# Patient Record
Sex: MALE | Race: WHITE | NOT HISPANIC OR LATINO | Employment: FULL TIME | URBAN - METROPOLITAN AREA
[De-identification: names, ages, dates, MRNs, and addresses within clinical notes are randomized per-mention and may not be internally consistent; named-entity substitution may affect disease eponyms.]

---

## 2017-02-21 ENCOUNTER — HOSPITAL ENCOUNTER (EMERGENCY)
Facility: HOSPITAL | Age: 62
Discharge: HOME | End: 2017-02-21
Admitting: EMERGENCY MEDICINE
Payer: COMMERCIAL

## 2017-02-21 ENCOUNTER — ALLSCRIPTS OFFICE VISIT (OUTPATIENT)
Dept: OTHER | Facility: OTHER | Age: 62
End: 2017-02-21

## 2017-02-21 ENCOUNTER — APPOINTMENT (EMERGENCY)
Dept: RADIOLOGY | Facility: HOSPITAL | Age: 62
End: 2017-02-21
Payer: COMMERCIAL

## 2017-02-21 VITALS
SYSTOLIC BLOOD PRESSURE: 193 MMHG | HEART RATE: 88 BPM | RESPIRATION RATE: 18 BRPM | TEMPERATURE: 98.9 F | WEIGHT: 232 LBS | OXYGEN SATURATION: 95 % | DIASTOLIC BLOOD PRESSURE: 107 MMHG

## 2017-02-21 DIAGNOSIS — S62.91XA HAND FRACTURE, RIGHT: Primary | ICD-10-CM

## 2017-02-21 DIAGNOSIS — S62.316A CLOSED DISPLACED FRACTURE OF BASE OF FIFTH METACARPAL BONE OF RIGHT HAND: ICD-10-CM

## 2017-02-21 PROCEDURE — 73130 X-RAY EXAM OF HAND: CPT

## 2017-02-21 PROCEDURE — 99283 EMERGENCY DEPT VISIT LOW MDM: CPT

## 2017-02-21 RX ORDER — IBUPROFEN 200 MG
400 TABLET ORAL ONCE
Status: COMPLETED | OUTPATIENT
Start: 2017-02-21 | End: 2017-02-21

## 2017-02-21 RX ORDER — OXYCODONE HYDROCHLORIDE AND ACETAMINOPHEN 5; 325 MG/1; MG/1
TABLET ORAL
Qty: 10 TABLET | Refills: 0 | Status: SHIPPED | OUTPATIENT
Start: 2017-02-21 | End: 2018-04-23

## 2017-02-21 RX ORDER — IBUPROFEN 200 MG
TABLET ORAL
Status: COMPLETED
Start: 2017-02-21 | End: 2017-02-21

## 2017-02-21 RX ADMIN — IBUPROFEN 400 MG: 200 TABLET, FILM COATED ORAL at 06:57

## 2017-02-21 RX ADMIN — Medication 400 MG: at 06:57

## 2017-02-24 ENCOUNTER — APPOINTMENT (OUTPATIENT)
Dept: OCCUPATIONAL THERAPY | Age: 62
End: 2017-02-24
Payer: COMMERCIAL

## 2017-02-24 ENCOUNTER — GENERIC CONVERSION - ENCOUNTER (OUTPATIENT)
Dept: OTHER | Facility: OTHER | Age: 62
End: 2017-02-24

## 2017-02-24 PROCEDURE — L3806 WHFO W/JOINT(S) CUSTOM FAB: HCPCS

## 2017-03-21 ENCOUNTER — ALLSCRIPTS OFFICE VISIT (OUTPATIENT)
Dept: OTHER | Facility: OTHER | Age: 62
End: 2017-03-21

## 2017-03-29 ENCOUNTER — APPOINTMENT (OUTPATIENT)
Dept: OCCUPATIONAL THERAPY | Facility: MEDICAL CENTER | Age: 62
End: 2017-03-29
Payer: COMMERCIAL

## 2017-03-29 ENCOUNTER — GENERIC CONVERSION - ENCOUNTER (OUTPATIENT)
Dept: OTHER | Facility: OTHER | Age: 62
End: 2017-03-29

## 2017-03-29 PROCEDURE — 97165 OT EVAL LOW COMPLEX 30 MIN: CPT

## 2017-04-03 ENCOUNTER — APPOINTMENT (OUTPATIENT)
Dept: OCCUPATIONAL THERAPY | Facility: MEDICAL CENTER | Age: 62
End: 2017-04-03
Payer: COMMERCIAL

## 2017-04-05 ENCOUNTER — APPOINTMENT (OUTPATIENT)
Dept: OCCUPATIONAL THERAPY | Facility: MEDICAL CENTER | Age: 62
End: 2017-04-05
Payer: COMMERCIAL

## 2017-04-07 ENCOUNTER — APPOINTMENT (OUTPATIENT)
Dept: OCCUPATIONAL THERAPY | Facility: MEDICAL CENTER | Age: 62
End: 2017-04-07
Payer: COMMERCIAL

## 2017-04-07 PROCEDURE — 97010 HOT OR COLD PACKS THERAPY: CPT

## 2017-04-07 PROCEDURE — 97110 THERAPEUTIC EXERCISES: CPT

## 2017-04-10 ENCOUNTER — APPOINTMENT (OUTPATIENT)
Dept: OCCUPATIONAL THERAPY | Facility: MEDICAL CENTER | Age: 62
End: 2017-04-10
Payer: COMMERCIAL

## 2017-04-12 ENCOUNTER — APPOINTMENT (OUTPATIENT)
Dept: OCCUPATIONAL THERAPY | Facility: MEDICAL CENTER | Age: 62
End: 2017-04-12
Payer: COMMERCIAL

## 2017-04-17 ENCOUNTER — APPOINTMENT (OUTPATIENT)
Dept: OCCUPATIONAL THERAPY | Facility: MEDICAL CENTER | Age: 62
End: 2017-04-17
Payer: COMMERCIAL

## 2017-04-18 ENCOUNTER — HOSPITAL ENCOUNTER (OUTPATIENT)
Dept: RADIOLOGY | Facility: OTHER | Age: 62
Discharge: HOME | End: 2017-04-18
Payer: COMMERCIAL

## 2017-04-18 ENCOUNTER — ALLSCRIPTS OFFICE VISIT (OUTPATIENT)
Dept: OTHER | Facility: OTHER | Age: 62
End: 2017-04-18

## 2017-04-18 DIAGNOSIS — S62.316A CLOSED DISPLACED FRACTURE OF BASE OF FIFTH METACARPAL BONE OF RIGHT HAND: ICD-10-CM

## 2017-04-19 ENCOUNTER — APPOINTMENT (OUTPATIENT)
Dept: OCCUPATIONAL THERAPY | Facility: MEDICAL CENTER | Age: 62
End: 2017-04-19
Payer: COMMERCIAL

## 2017-04-24 ENCOUNTER — APPOINTMENT (OUTPATIENT)
Dept: OCCUPATIONAL THERAPY | Facility: MEDICAL CENTER | Age: 62
End: 2017-04-24
Payer: COMMERCIAL

## 2017-04-26 ENCOUNTER — APPOINTMENT (OUTPATIENT)
Dept: OCCUPATIONAL THERAPY | Facility: MEDICAL CENTER | Age: 62
End: 2017-04-26
Payer: COMMERCIAL

## 2017-05-22 ENCOUNTER — ALLSCRIPTS OFFICE VISIT (OUTPATIENT)
Dept: OTHER | Facility: OTHER | Age: 62
End: 2017-05-22

## 2017-05-23 ENCOUNTER — LAB CONVERSION - ENCOUNTER (OUTPATIENT)
Dept: OTHER | Facility: OTHER | Age: 62
End: 2017-05-23

## 2017-05-23 LAB
25(OH)D3 SERPL-MCNC: 36 NG/ML (ref 30–100)
A/G RATIO (HISTORICAL): 1.8 (CALC) (ref 1–2.5)
ALBUMIN SERPL BCP-MCNC: 4.6 G/DL (ref 3.6–5.1)
ALP SERPL-CCNC: 75 U/L (ref 40–115)
ALT SERPL W P-5'-P-CCNC: 52 U/L (ref 9–46)
AST SERPL W P-5'-P-CCNC: 28 U/L (ref 10–35)
BASOPHILS # BLD AUTO: 0.1 %
BASOPHILS # BLD AUTO: 8 CELLS/UL (ref 0–200)
BILIRUB SERPL-MCNC: 0.6 MG/DL (ref 0.2–1.2)
BUN SERPL-MCNC: 17 MG/DL (ref 7–25)
BUN/CREA RATIO (HISTORICAL): ABNORMAL (CALC) (ref 6–22)
CALCIUM (ADJUSTED FOR ALBUMIN) (HISTORICAL): 9.8 MG/DL (CALC) (ref 8.6–10.2)
CALCIUM SERPL-MCNC: 9.9 MG/DL (ref 8.6–10.3)
CHLORIDE SERPL-SCNC: 100 MMOL/L (ref 98–110)
CHOLEST SERPL-MCNC: 161 MG/DL (ref 125–200)
CHOLEST/HDLC SERPL: 3.3 (CALC)
CO2 SERPL-SCNC: 24 MMOL/L (ref 20–31)
CREAT SERPL-MCNC: 0.82 MG/DL (ref 0.7–1.25)
CREATININE, RANDOM URINE (HISTORICAL): 121 MG/DL (ref 20–370)
DEPRECATED RDW RBC AUTO: 13.6 % (ref 11–15)
EGFR AFRICAN AMERICAN (HISTORICAL): 111 ML/MIN/1.73M2
EGFR-AMERICAN CALC (HISTORICAL): 95 ML/MIN/1.73M2
EOSINOPHIL # BLD AUTO: 1.9 %
EOSINOPHIL # BLD AUTO: 156 CELLS/UL (ref 15–500)
GAMMA GLOBULIN (HISTORICAL): 2.6 G/DL (CALC) (ref 1.9–3.7)
GLUCOSE (HISTORICAL): 201 MG/DL (ref 65–99)
HBA1C MFR BLD HPLC: 10.5 % OF TOTAL HGB
HCT VFR BLD AUTO: 46 % (ref 38.5–50)
HDLC SERPL-MCNC: 49 MG/DL
HGB BLD-MCNC: 15.1 G/DL (ref 13.2–17.1)
LDL CHOLESTEROL (HISTORICAL): 85 MG/DL (CALC)
LYMPHOCYTES # BLD AUTO: 1599 CELLS/UL (ref 850–3900)
LYMPHOCYTES # BLD AUTO: 19.5 %
MAGNESIUM, UR (HISTORICAL): 3.5 MG/DL
MCH RBC QN AUTO: 29 PG (ref 27–33)
MCHC RBC AUTO-ENTMCNC: 33 G/DL (ref 32–36)
MCV RBC AUTO: 88.1 FL (ref 80–100)
MICROALBUMIN/CREATININE RATIO (HISTORICAL): 29 MCG/MG CREAT
MONOCYTES # BLD AUTO: 500 CELLS/UL (ref 200–950)
MONOCYTES (HISTORICAL): 6.1 %
NEUTROPHILS # BLD AUTO: 5937 CELLS/UL (ref 1500–7800)
NEUTROPHILS # BLD AUTO: 72.4 %
NON-HDL-CHOL (CHOL-HDL) (HISTORICAL): 112 MG/DL (CALC)
PLATELET # BLD AUTO: 233 THOUSAND/UL (ref 140–400)
PMV BLD AUTO: 10.1 FL (ref 7.5–12.5)
POTASSIUM SERPL-SCNC: 4.3 MMOL/L (ref 3.5–5.3)
RBC # BLD AUTO: 5.21 MILLION/UL (ref 4.2–5.8)
SODIUM SERPL-SCNC: 136 MMOL/L (ref 135–146)
TOTAL PROTEIN (HISTORICAL): 7.2 G/DL (ref 6.1–8.1)
TRIGL SERPL-MCNC: 137 MG/DL
TSH SERPL DL<=0.05 MIU/L-ACNC: 0.93 MIU/L (ref 0.4–4.5)
WBC # BLD AUTO: 8.2 THOUSAND/UL (ref 3.8–10.8)

## 2017-11-22 ENCOUNTER — ALLSCRIPTS OFFICE VISIT (OUTPATIENT)
Dept: OTHER | Facility: OTHER | Age: 62
End: 2017-11-22

## 2017-11-23 ENCOUNTER — LAB CONVERSION - ENCOUNTER (OUTPATIENT)
Dept: OTHER | Facility: OTHER | Age: 62
End: 2017-11-23

## 2017-11-23 LAB
A/G RATIO (HISTORICAL): 1.7 (CALC) (ref 1–2.5)
ALBUMIN SERPL BCP-MCNC: 4.4 G/DL (ref 3.6–5.1)
ALP SERPL-CCNC: 79 U/L (ref 40–115)
ALT SERPL W P-5'-P-CCNC: 39 U/L (ref 9–46)
AST SERPL W P-5'-P-CCNC: 18 U/L (ref 10–35)
BILIRUB SERPL-MCNC: 0.4 MG/DL (ref 0.2–1.2)
BUN SERPL-MCNC: 14 MG/DL (ref 7–25)
BUN/CREA RATIO (HISTORICAL): ABNORMAL (CALC) (ref 6–22)
CALCIUM (ADJUSTED FOR ALBUMIN) (HISTORICAL): 9.6 MG/DL (CALC) (ref 8.6–10.2)
CALCIUM SERPL-MCNC: 9.6 MG/DL (ref 8.6–10.3)
CHLORIDE SERPL-SCNC: 101 MMOL/L (ref 98–110)
CHOLEST SERPL-MCNC: 157 MG/DL
CHOLEST/HDLC SERPL: 3 (CALC)
CO2 SERPL-SCNC: 26 MMOL/L (ref 20–31)
CREAT SERPL-MCNC: 0.84 MG/DL (ref 0.7–1.25)
EGFR AFRICAN AMERICAN (HISTORICAL): 110 ML/MIN/1.73M2
EGFR-AMERICAN CALC (HISTORICAL): 95 ML/MIN/1.73M2
GAMMA GLOBULIN (HISTORICAL): 2.6 G/DL (CALC) (ref 1.9–3.7)
GLUCOSE (HISTORICAL): 144 MG/DL (ref 65–99)
HBA1C MFR BLD HPLC: 8.5 % OF TOTAL HGB
HDLC SERPL-MCNC: 52 MG/DL
LDL CHOLESTEROL (HISTORICAL): 86 MG/DL (CALC)
NON-HDL-CHOL (CHOL-HDL) (HISTORICAL): 105 MG/DL (CALC)
POTASSIUM SERPL-SCNC: 4.2 MMOL/L (ref 3.5–5.3)
SODIUM SERPL-SCNC: 137 MMOL/L (ref 135–146)
TOTAL PROTEIN (HISTORICAL): 7 G/DL (ref 6.1–8.1)
TRIGL SERPL-MCNC: 99 MG/DL

## 2017-11-23 NOTE — PROGRESS NOTES
Assessment    1  Benign essential hypertension (401 1) (I10)   2  DM type 2, not at goal (250 00) (E11 9)   3  Hyperlipidemia (272 4) (E78 5)   4  Osteoarthritis (715 90) (M19 90)   5  Obesity (278 00) (E66 9)   6  Rhinitis (472 0) (J31 0)   7  Vitamin D deficiency (268 9) (E55 9)   8  Bilateral shoulder pain (719 41) (M25 511,M25 512)    Plan  Benign essential hypertension    · Begin a limited exercise program ; Status:Complete;   Done: 44GAK5293   · Begin or continue regular aerobic exercise  Gradually work up to at least 3 sessions of30 minutes of exercise a week ; Status:Complete;   Done: 43RHJ6308   · Continue with our present treatment plan ; Status:Complete;   Done: 20YMK1234   · Eat a low fat and low cholesterol diet ; Status:Complete;   Done: 35DOE5587   · Restrict the salt in your diet by avoiding highly salted foods ; Status:Complete;   Done:22Nov2017   · Restrict your sodium (salt) intake to 2 grams per day ; Status:Complete;   Done:22Nov2017   · Take your blood pressure twice a week  Record the numbers and bring them with you toyour appointments ; Status:Complete;   Done: 55MPO4907   · We encourage you to begin to make lifestyle changes to help control your bloodpressure  These may include losing weight, increasing your activity level, limiting salt inyour diet, decreasing alcohol intake, and eating a diet low in fat and rich in fruitsand vegetables ; Status:Complete;   Done: 45WSW7800   · Call [phone] if: Your blood pressure is frequently higher than 140/90  ;Status:Complete;   Done: 14UDF7440  Benign essential hypertension, DM type 2, not at goal, Hyperlipidemia    · (1) HEMOGLOBIN A1C; Status:Active; Requested for:22Nov2017;    · (Q) COMPREHENSIVE METABOLIC PNL W/ADJUSTED CALCIUM; Status:Active; Requested for:22Nov2017;    · (Q) LIPID PANEL WITH REFLEX TO DIRECT LDL; Status:Active;  Requestedfor:22Nov2017;   Bilateral shoulder pain    · 1 - Ashanti Medrano MD, Nirav Crisostomo (Orthopedic Surgery) Co-Management *  Status: Active Requested for: 22Nov2017  Care Summary provided  : Yes  DM type 2, not at goal    · *VB - Foot Exam; Status:Resulted - Requires Verification,Retrospective Authorization;  Done: 44SJZ8693 08:31AM   · Brush your teeth {freq1} and floss at least once a day ; Status:Complete;   Done:22Nov2017   · Cut your nails straight across ; Status:Complete;   Done: 48HDN4780   · Follow a diabetic diet with 1500 calories ; Status:Complete;   Done: 99VID8462   · Inspect your feet and legs daily if you have vascular disease ; Status:Complete;   Done:22Nov2017   · Inspect your feet daily ; Status:Complete;   Done: 84GNN2086   · Some eating tips that can help you lose weight ; Status:Complete;   Done: 72BSG8570   · There are many exercise options for seniors ; Status:Complete;   Done: 83EGL8743   · We recommend that you bring your body mass index down to 26 ; Status:Complete;  Done: 91GXK5555   · We recommend that you change your eating habits slowly ; Status:Complete;   Done:22Nov2017   · Wear shoes that give your toes plenty of room ; Status:Complete;   Done: 01QLB4542   · You will need to take a blood sugar reading 4 times a day ; Status:Complete;   Done:22Nov2017  Hyperlipidemia    · A diet that is low in fat, cholesterol, and sodium is considered a cardiac diet  ;Status:Complete;   Done: 26FAT3315   · Eat no more than 30 grams of fat per day ; Status:Complete;   Done: 88XPL5496   · We recommend you modify your diet to achieve and maintain a healthy weight  Marissa Shock may increase your risk for developing health problems such as diabetes,heart disease, and cancer  Avoid high fat foods and eat a balanced diet richin fruits and vegetables  The combination of a reduced-calorie diet and increasedphysical activity is recommended  Please let us know if you would like tolearn more about your nutrition and calorie needs, and additional options includingweight loss programs that can help you achieve your goals  ; Status:Complete;   RLF:84PBN3708    Discussion/Summary    Patient refuses flu vaccine  Fasting labs drawn for CMP, lipid profile and hemoglobin A1c  Patient to continue present treatment and discussed possibly adding another medication for diabetes pending lab results  Discussed goal of hemoglobin A1c less than 7  Patient instructed to follow a low-fat, low-salt and a low sugar/carbohydrate diet more carefully and get regular exercise walking 5 days a week for 30 minutes  Continued weight loss encouraged  Recommend patient do home glucose monitoring daily  Patient is being referred to Dr Cliff Boston at [address] [place] [address] for further evaluation of bilateral shoulder pain  Patient instructed to schedule follow-up diabetic eye exam  Patient to return to the office in 4-6 months  Possible side effects of new medications were reviewed with the patient/guardian today  The treatment plan was reviewed with the patient/guardian  The patient/guardian understands and agrees with the treatment plan      Chief Complaint  Fasting  Patient is here today for follow up of chronic conditions described in HPI  History of Present Illness  Patient is here for routine appointment for chronic conditions and fasting labs  Patient has been feeling fairly well overall other complains of continued bilateral shoulder pain  Patient discontinued Gillermina Sumner about 1 month ago secondary to low back pain which has since resolved  Patient has been checking home glucose monitoring occasionally and fasting blood sugars in the range of 125-150 and 11:00 p m  around 150 occasional 200  Patient is up-to-date on diabetic eye exam and is due for diabetic foot exam  Patient declines flu vaccine  No regular exercise program although patient remains physically active at work  The patient states his hyperlipidemia has been under good control since the last visit  Comorbid Illnesses: diabetes mellitus-- and-- hypertension   He has no significant interval events  Symptoms: denies muscle pain-- and-- denies muscle weakness  Associated symptoms include no memory loss  Medications: the patient is adherent with his medication regimen  -- He denies medication side effects  The patient is doing well with his hyperlipidemia goals  the patient's last LDL was 85 mg/dL  The patient is due for a lipid panel-- and-- liver function tests  The patient presents for follow-up of essential hypertension  The patient states he has been doing poorly with his blood pressure control since the last visit  He has no significant interval events  Symptoms: denies impaired vision,-- denies dyspnea,-- denies chest pain,-- denies intermittent leg claudication-- and-- denies lower extremity edema  Associated symptoms include no headache  Home monitoring: The patient is not checking blood pressure at home  Medications: the patient is adherent with his medication regimen  -- He denies medication side effects  The patient states he has been doing poorly with his Type II Diabetes control since the last visit  Comorbid Illnesses: hypertension,-- hyperlipidemia-- and-- obesity  He has no significant interval events  Symptoms: denies numbness of the feet,-- denies a foot ulcer,-- stable foot pain-- and-- denies visual impairment  Home monitoring: The patient checks his blood sugar sporadically  -- Glycemic control has been good  -- the patient reports no symptomatic hypoglycemic episodes  Medications: the patient is adherent with his medication regimen  -- He denies medication side effects  The patient is not doing well with his diabetes goals  Due For: a hemoglobin A1c  The patient states his osteoarthritis has been stable since the last visit  The patient's osteoarthritis has not resulted in physical disability     Symptoms: stable knee pain,-- stable hip pain,-- stable finger pain,-- denies neck pain-- and-- denies back pain--   The patient presents with complaints of bilateral shoulder worsened shoulder pain  Associated symptoms include joint stiffness, but-- no localized joint swelling  Activities: able to do activities of daily living without limitations,-- able to do housework without limitations-- and-- able to work without limitations  Medications: the patient is adherent with his medication regimen  -- He denies medication side effects  Disease Management: the patient is not doing well with his osteoarthritis goals  Review of Systems   Constitutional: recent 9 lb weight loss, but-- no fever,-- not feeling poorly-- and-- no chills--   The patient presents with complaints of occasional episodes of feeling tired  Eyes: No complaints of eye pain, no red eyes, no discharge from eyes, no itchy eyes  ENT: nasal discharge, but-- no earache,-- no nosebleeds,-- no sore throat-- and-- no hearing loss  Gastrointestinal: No complaints of abdominal pain, no constipation, no nausea or vomiting, no diarrhea or bloody stools  Genitourinary: nocturia-- and-- 2x, but-- no dysuria--   The patient presents with complaints of occasional episodes of urinary hesitancy  Hematologic/Lymphatic: No complaints of swollen glands, no swollen glands in the neck, does not bleed easily, no easy bruising  Active Problems  1  Benign essential hypertension (401 1) (I10)   2  Closed disp fracture of base of fifth metacarpal bone of right hand (815 02) (S62 316A)   3  DM type 2, not at goal (250 00) (E11 9)   4  Encounter for prostate cancer screening (V76 44) (Z12 5)   5  Enlarged prostate without lower urinary tract symptoms (luts) (600 00) (N40 0)   6  Erectile dysfunction of non-organic origin (302 72) (F52 21)   7  Hypercholesterolemia (272 0) (E78 00)   8  Hyperlipidemia (272 4) (E78 5)   9  Obesity (278 00) (E66 9)   10  Osteoarthritis (715 90) (M19 90)   11  Osteoarthrosis of knee (715 36) (M17 10)   12  Positive depression screening (796 4) (Z13 89)   13   Preoperative examination (V72 84) (Z01 818)   14  Rhinitis (472 0) (J31 0)   15  Visit for pre-operative examination (V7 84) (Z01 818)   16  Vitamin D deficiency (268 9) (E55 9)    Family History  Mother    1  Family history of Diabetes Mellitus (V18 0)   2  Family history of Mother  At Age 76  Father    3  Family history of Father  At Age 71   1  Family history of Prostate Cancer (V16 42)  Maternal Uncle    5  Family history of CABG  Family History    6  Denied: Family history of Colon Cancer   7  Denied: Family history of Stroke Syndrome    Social History     · Being A Social Drinker   · Caffeine Use   · Never a smoker   · Never A Smoker    Current Meds   1  AmLODIPine Besylate 5 MG Oral Tablet; TAKE 1 TABLET DAILY; Therapy: 06BDX0621 to (Evaluate:2018)  Requested for: 17SEU8617; Last Rx:2017 Ordered   2  Aspirin EC 81 MG Oral Tablet Delayed Release; TAKE 1 TABLET DAILY AS DIRECTED; Therapy: 08FFD9489 to Recorded   3  FreeStyle Lancets Miscellaneous; TEST BLOOD SUGAR ONCE DAILY; Therapy: 88OXI9269 to (Last NL:46DBJ3312)  Requested for: 82WLH2611 Ordered   4  FreeStyle Lite Test In Vitro Strip; USE 1 STRIP Daily; Therapy: 84NVV0036 to (Last KELLIE:23CVZ6626)  Requested for: 22CGT2835 Ordered   5  FreeStyle System KIT; USE AS DIRECTED; Therapy: 75MWY8855 to (Last KN:79JIZ1748)  Requested for: 22FSN3775 Ordered   6  Lisinopril-Hydrochlorothiazide 20-25 MG Oral Tablet; TAKE 1 TABLET DAILY; Therapy: 55TPV5018 to (Evaluate:2018)  Requested for: 42ZHG4100; Last Rx:2017 Ordered   7  Meloxicam 15 MG Oral Tablet; TAKE 1 TABLET DAILY WITH FOOD; Therapy: 42MUF0645 to (Evaluate:2018)  Requested for: 51WTY5233; Last Rx:2017 Ordered   8  MetFORMIN HCl - 500 MG Oral Tablet; TAKE TWO TABLETS BY MOUTH TWICE DAILY; Therapy: 02DUC6674 to (Last Rx:2017)  Requested for: 36SPT7758 Ordered   9  Pravastatin Sodium 80 MG Oral Tablet; Take 1 tablet daily;  Therapy: 43XQH3574 to (Shellie Yin)  Requested for: 12VIE0241; Last Rx:51Ejd9458 Ordered   10  Vitamin D 2000 UNIT Oral Capsule; 1 qd; Therapy: 72YOA1179 to Recorded    Allergies  1  No Known Drug Allergies    Vitals  Vital Signs    Recorded: 22Nov2017 08:51AM Recorded: 22Nov2017 08:25AM   Temperature  98 5 F   Heart Rate 84    Respiration 16    Systolic 574    Diastolic 86    Height  5 ft 7 in   Weight  225 lb    BMI Calculated  35 24   BSA Calculated  2 13       Physical Exam   Constitutional  General appearance: No acute distress, well appearing and well nourished  Eyes  Conjunctiva and lids: No swelling, erythema, or discharge  Ears, Nose, Mouth, and Throat  External inspection of ears and nose: Normal    Otoscopic examination: Tympanic membrance translucent with normal light reflex  Canals patent without erythema  Nasal mucosa, septum, and turbinates: Normal without edema or erythema  Oropharynx: Normal with no erythema, edema, exudate or lesions  Pulmonary  Respiratory effort: No increased work of breathing or signs of respiratory distress  Auscultation of lungs: Clear to auscultation, equal breath sounds bilaterally, no wheezes, no rales, no rhonci  Cardiovascular  Auscultation of heart: Normal rate and rhythm, normal S1 and S2, without murmurs  Examination of extremities for edema and/or varicosities: Normal    Carotid pulses: Normal    Abdomen  Abdomen: Non-tender, no masses  Lymphatic  Palpation of lymph nodes in neck: No lymphadenopathy  Musculoskeletal  Gait and station: Normal    Inspection/palpation of joints, bones, and muscles: Abnormal  -- Shoulders range of motion intact and joint tenderness bilaterally  Skin  Skin and subcutaneous tissue: Normal without rashes or lesions  Psychiatric  Orientation to person, place and time: Normal    Mood and affect: Normal    Diabetic Foot Screen: Normal      Socks and shoes removed, Right Foot Findings: normal foot, no swelling, no erythema   Normal tactile sensation with monofilament testing throughout the right foot  Socks and shoes removed, Left Foot Findings: normal foot, no swelling, no erythema  Normal tactile sensation with monofilament testing throughout the left foot  Pulses:  2+ in the dorsalis pedis on the right  Pulses:  2+ in the dorsalis pedis on the left  Results/Data  *VB - Foot Exam [id number] 08:31AM Mason Quiñonezdain     Test Name Result Flag Reference   FOOT EXAM [id number]                       Health Management  DM type 2, not at goal   *VB - Eye Exam; every 1 year; Last 22Nov2016; Next Due: 22Nov2017; Overdue  Hyperlipidemia   (1) HEPATIC FUNCTION PANEL; every 6 months; Last 33FPA6704; Next Due: 29IXN2402; Overdue  (1) LIPID PANEL, FASTING; every 1 year; Last 02BHU2993; Next Due: 02Jpr0796;  Overdue    Signatures   Electronically signed by : Rachel Barker DO; Nov 22 2017  9:05AM EST                       (Author)

## 2017-11-24 ENCOUNTER — GENERIC CONVERSION - ENCOUNTER (OUTPATIENT)
Dept: OTHER | Facility: OTHER | Age: 62
End: 2017-11-24

## 2017-12-04 ENCOUNTER — ALLSCRIPTS OFFICE VISIT (OUTPATIENT)
Dept: OTHER | Facility: OTHER | Age: 62
End: 2017-12-04

## 2018-01-10 NOTE — RESULT NOTES
Discussion/Summary   Phone call to patient discussed lab results  Fasting blood sugar remains elevated over 200 and overall blood sugar control, hemoglobin A1c, remains significantly elevated at 10 5  Patient will add Farxiga 10 mg daily #28 samples and prescription and savings card provided  Verified Results  (Q) CBC (INCLUDES DIFF/PLT) (REFL) [id number] 12:00AM [facility] Eladia     Test Name Result Flag Reference   WHITE BLOOD CELL COUNT 8 2 Thousand/uL  3 8-10 8   RED BLOOD CELL COUNT 5 21 Million/uL  4 20-5 80   HEMOGLOBIN 15 1 g/dL  13 2-17 1   HEMATOCRIT 46 0 %  38 5-50 0   MCV 88 1 fL  80 0-100 0   MCH 29 0 pg  27 0-33 0   MCHC 33 0 g/dL  32 0-36 0   RDW 13 6 %  11 0-15 0   PLATELET COUNT 133 Thousand/uL  140-400   MPV 10 1 fL  7 5-12 5   ABSOLUTE NEUTROPHILS 5937 cells/uL  0341-5615   ABSOLUTE LYMPHOCYTES 1599 cells/uL  850-3900   ABSOLUTE MONOCYTES 500 cells/uL  200-950   ABSOLUTE EOSINOPHILS 156 cells/uL     ABSOLUTE BASOPHILS 8 cells/uL  0-200   NEUTROPHILS 72 4 %     LYMPHOCYTES 19 5 %     MONOCYTES 6 1 %     EOSINOPHILS 1 9 %     BASOPHILS 0 1 %       (Q) COMPREHENSIVE METABOLIC PNL W/ADJUSTED CALCIUM [id number] 12:00AM Maday Eladia     Test Name Result Flag Reference   GLUCOSE 201 mg/dL H 65-99   Fasting reference interval     For someone without known diabetes, a glucose  value >125 mg/dL indicates that they may have  diabetes and this should be confirmed with a  follow-up test    UREA NITROGEN (BUN) 17 mg/dL  7-25   CREATININE 0 82 mg/dL  0 70-1 25   For patients >52years of age, the reference limit  for Creatinine is approximately 13% higher for people  identified as -American  eGFR NON-AFR   AMERICAN 95 mL/min/1 73m2  > OR = 60   eGFR AFRICAN AMERICAN 111 mL/min/1 73m2  > OR = 60   BUN/CREATININE RATIO   0-27   NOT APPLICABLE (calc)   SODIUM 136 mmol/L  135-146   POTASSIUM 4 3 mmol/L  3 5-5 3   CHLORIDE 100 mmol/L     CARBON DIOXIDE 24 mmol/L  20-31   CALCIUM 9 9 mg/dL 8  6-10 3   CALCIUM (ADJUSTED FOR$ALBUMIN) 9 8 mg/dL (calc)  8 6-10 2   PROTEIN, TOTAL 7 2 g/dL  6 1-8 1   ALBUMIN 4 6 g/dL  3 6-5 1   GLOBULIN 2 6 g/dL (calc)  1 9-3 7   ALBUMIN/GLOBULIN RATIO 1 8 (calc)  1 0-2 5   BILIRUBIN, TOTAL 0 6 mg/dL  0 2-1 2   ALKALINE PHOSPHATASE 75 U/L     AST 28 U/L  10-35   ALT 52 U/L H 9-46     (Q) LIPID PANEL WITH REFLEX TO DIRECT LDL [id number] 12:00AM [facility]     Test Name Result Flag Reference   CHOLESTEROL, TOTAL 161 mg/dL  125-200   HDL CHOLESTEROL 49 mg/dL  > OR = 40   TRIGLICERIDES 691 mg/dL  <150   LDL-CHOLESTEROL 85 mg/dL (calc)  <130   Desirable range <100 mg/dL for patients with CHD or  diabetes and <70 mg/dL for diabetic patients with  known heart disease  CHOL/HDLC RATIO 3 3 (calc)  < OR = 5 0   NON HDL CHOLESTEROL 112 mg/dL (calc)     Target for non-HDL cholesterol is 30 mg/dL higher than   LDL cholesterol target  (Q) MICROALBUMIN, RANDOM URINE (W/CREATININE) [id number] 12:00AM [facility]     Test Name Result Flag Reference   CREATININE, RANDOM URINE 121 mg/dL     MICROALBUMIN 3 5 mg/dL     Reference Range  Not established   MICROALBUMIN/CREATININE$RATIO, RANDOM URINE 29 mcg/mg creat  <30   The ADA defines abnormalities in albumin  excretion as follows:     Category         Result (mcg/mg creatinine)     Normal                    <30  Microalbuminuria            Clinical albuminuria   > OR = 300     The ADA recommends that at least two of three  specimens collected within a 3-6 month period be  abnormal before considering a patient to be  within a diagnostic category       (Q) TSH, 3RD GENERATION W/REFLEX TO FT4 [id number] 12:00AM [facility]     Test Name Result Flag Reference   TSH W/REFLEX TO FT4 0 93 mIU/L  0 40-4 50     *(Q) VITAMIN D, 25-HYDROXY, LC/MS/MS [id number] 12:00AM [facility]     Test Name Result Flag Reference   VITAMIN D, 25-OH, TOTAL 36 ng/mL     Vitamin D Status         25-OH Vitamin D:     Deficiency: <20 ng/mL  Insufficiency:             20 - 29 ng/mL  Optimal:                 > or = 30 ng/mL     For 25-OH Vitamin D testing on patients on   D2-supplementation and patients for whom quantitation   of D2 and D3 fractions is required, the QuestAssureD(TM)  25-OH VIT D, (D2,D3), LC/MS/MS is recommended: order   code 95499 (patients >2yrs)  For more information on this test, go to:  http://education  [employer]/faq/GKO372  (This link is being provided for   informational/educational purposes only )     (Q) HEMOGLOBIN A1c [id number] 12:00AM [facility]     Test Name Result Flag Reference   HEMOGLOBIN A1c 10 5 % of total Hgb H <5 7   For someone without known diabetes, a hemoglobin A1c  value of 6 5% or greater indicates that they may have   diabetes and this should be confirmed with a follow-up   test      For someone with known diabetes, a value <7% indicates   that their diabetes is well controlled and a value   greater than or equal to 7% indicates suboptimal   control  A1c targets should be individualized based on   duration of diabetes, age, comorbid conditions, and   other considerations  Currently, no consensus exists regarding use of  hemoglobin A1c for diagnosis of diabetes for children  Plan  DM type 2, not at goal    · Farxiga 10 MG Oral Tablet;  Take 1 tablet daily

## 2018-01-14 VITALS
RESPIRATION RATE: 16 BRPM | HEART RATE: 84 BPM | SYSTOLIC BLOOD PRESSURE: 138 MMHG | TEMPERATURE: 98.5 F | BODY MASS INDEX: 35.31 KG/M2 | DIASTOLIC BLOOD PRESSURE: 86 MMHG | WEIGHT: 225 LBS | HEIGHT: 67 IN

## 2018-01-14 VITALS
DIASTOLIC BLOOD PRESSURE: 107 MMHG | SYSTOLIC BLOOD PRESSURE: 169 MMHG | BODY MASS INDEX: 38.45 KG/M2 | HEART RATE: 93 BPM | WEIGHT: 245 LBS | HEIGHT: 67 IN

## 2018-01-14 VITALS
BODY MASS INDEX: 36.41 KG/M2 | SYSTOLIC BLOOD PRESSURE: 178 MMHG | HEART RATE: 90 BPM | WEIGHT: 232 LBS | HEIGHT: 67 IN | DIASTOLIC BLOOD PRESSURE: 110 MMHG

## 2018-01-14 VITALS
WEIGHT: 234 LBS | HEIGHT: 67 IN | BODY MASS INDEX: 36.73 KG/M2 | SYSTOLIC BLOOD PRESSURE: 183 MMHG | DIASTOLIC BLOOD PRESSURE: 98 MMHG | HEART RATE: 101 BPM

## 2018-01-14 NOTE — RESULT NOTES
Verified Results  (1) PSA (SCREEN) (Dx V76 44 Screen for Prostate Cancer) [id number] 12:00AM Mal Morovis     Test Name Result Flag Reference   PSA, TOTAL 0 4 ng/mL  < OR = 4 0   This test was performed using the Siemens  chemiluminescent method  Values obtained from  different assay methods cannot be used  interchangeably  PSA levels, regardless of  value, should not be interpreted as absolute  evidence of the presence or absence of disease  (Q) CBC (INCLUDES DIFF/PLT) (REFL) [id number] 12:00AM Mal Morovis     Test Name Result Flag Reference   WHITE BLOOD CELL COUNT 8 6 Thousand/uL  3 8-10 8   RED BLOOD CELL COUNT 5 07 Million/uL  4 20-5 80   HEMOGLOBIN 14 3 g/dL  13 2-17 1   HEMATOCRIT 44 2 %  38 5-50 0   MCV 87 2 fL  80 0-100 0   MCH 28 1 pg  27 0-33 0   MCHC 32 3 g/dL  32 0-36 0   RDW 14 0 %  11 0-15 0   PLATELET COUNT 395 Thousand/uL  140-400   MPV 10 0 fL  7 5-11 5   ABSOLUTE NEUTROPHILS 5848 cells/uL  8951-2196   ABSOLUTE LYMPHOCYTES 1961 cells/uL  850-3900   ABSOLUTE MONOCYTES 568 cells/uL  200-950   ABSOLUTE EOSINOPHILS 189 cells/uL     ABSOLUTE BASOPHILS 34 cells/uL  0-200   NEUTROPHILS 68 0 %     LYMPHOCYTES 22 8 %     MONOCYTES 6 6 %     EOSINOPHILS 2 2 %     BASOPHILS 0 4 %       (Q) COMPREHENSIVE METABOLIC PNL W/ADJUSTED CALCIUM [id number] 12:00AM Mal Morovis     Test Name Result Flag Reference   GLUCOSE 174 mg/dL H 65-99   Fasting reference interval   UREA NITROGEN (BUN) 21 mg/dL  7-25   CREATININE 0 91 mg/dL  0 70-1 25   For patients >52years of age, the reference limit  for Creatinine is approximately 13% higher for people  identified as -American  eGFR NON-AFR   AMERICAN 91 mL/min/1 73m2  > OR = 60   eGFR AFRICAN AMERICAN 106 mL/min/1 73m2  > OR = 60   BUN/CREATININE RATIO   1-46   NOT APPLICABLE (calc)   SODIUM 138 mmol/L  135-146   POTASSIUM 4 4 mmol/L  3 5-5 3   CHLORIDE 102 mmol/L     CARBON DIOXIDE 19 mmol/L  19-30   CALCIUM 9 6 mg/dL  8 6-10 3   CALCIUM (ADJUSTED FOR$ALBUMIN) 9 7 mg/dL (calc)  8 6-10 2   PROTEIN, TOTAL 7 0 g/dL  6 1-8 1   ALBUMIN 4 3 g/dL  3 6-5 1   GLOBULIN 2 7 g/dL (calc)  1 9-3 7   ALBUMIN/GLOBULIN RATIO 1 6 (calc)  1 0-2 5   BILIRUBIN, TOTAL 0 5 mg/dL  0 2-1 2   ALKALINE PHOSPHATASE 73 U/L     AST 21 U/L  10-35   ALT 36 U/L  9-46     (Q) LIPID PANEL WITH REFLEX TO DIRECT LDL [id number] 12:00AM [facility]     Test Name Result Flag Reference   CHOLESTEROL, TOTAL 184 mg/dL  125-200   HDL CHOLESTEROL 44 mg/dL  > OR = 40   TRIGLICERIDES 006 mg/dL H <150   LDL-CHOLESTEROL 105 mg/dL (calc)  <130   Desirable range <100 mg/dL for patients with CHD or  diabetes and <70 mg/dL for diabetic patients with  known heart disease  CHOL/HDLC RATIO 4 2 (calc)  < OR = 5 0   NON HDL CHOLESTEROL 140 mg/dL (calc)     Target for non-HDL cholesterol is 30 mg/dL higher than   LDL cholesterol target  (Q) MICROALBUMIN, RANDOM URINE (W/CREATININE) [id number] 12:00AM [facility]     Test Name Result Flag Reference   CREATININE, RANDOM URINE 201 mg/dL     MICROALBUMIN 1 1 mg/dL     Reference Range  Not established   MICROALBUMIN/CREATININE$RATIO, RANDOM URINE 5 mcg/mg creat  <30   The ADA defines abnormalities in albumin  excretion as follows:     Category         Result (mcg/mg creatinine)     Normal                    <30  Microalbuminuria            Clinical albuminuria   > OR = 300     The ADA recommends that at least two of three  specimens collected within a 3-6 month period be  abnormal before considering a patient to be  within a diagnostic category       (Q) TSH, 3RD GENERATION W/REFLEX TO FT4 [id number] 12:00AM [facility]     Test Name Result Flag Reference   TSH W/REFLEX TO FT4 1 86 mIU/L  0 40-4 50     *(Q) VITAMIN D, 25-HYDROXY, LC/MS/MS [id number] 12:00AM [facility]     Test Name Result Flag Reference   VITAMIN D, 25-OH, TOTAL 34 ng/mL     Vitamin D Status         25-OH Vitamin D:     Deficiency: <20 ng/mL  Insufficiency:             20 - 29 ng/mL  Optimal:                 > or = 30 ng/mL     For 25-OH Vitamin D testing on patients on   D2-supplementation and patients for whom quantitation   of D2 and D3 fractions is required, the QuestAssureD(TM)  25-OH VIT D, (D2,D3), LC/MS/MS is recommended: order   code 53930 (patients >2yrs)  For more information on this test, go to:  http://Finomial/faq/XVA364  (This link is being provided for   informational/educational purposes only )     (Q) HEMOGLOBIN A1c [id number] 12:00AM Jalen Langconcepcioncheli     Test Name Result Flag Reference   HEMOGLOBIN A1c 11 4 % of total Hgb H <5 7   According to ADA guidelines, hemoglobin A1c <7 0%  represents optimal control in non-pregnant diabetic  patients  Different metrics may apply to specific  patient populations  Standards of Medical Care in  [phone]  Diabetes Care  2013;36:s11-s66     For the purpose of screening for the presence of  diabetes  <5 7%       Consistent with the absence of diabetes  5 7-6 4%    Consistent with increased risk for diabetes              (prediabetes)  >or=6 5%    Consistent with diabetes     This assay result is consistent with diabetes  mellitus  Currently, no consensus exists for use of hemoglobin  A1c for diagnosis of diabetes for children  Plan  DM type 2, not at goal    · From  MetFORMIN HCl - 500 MG Oral Tablet Take 1 tablet twice daily To  MetFORMIN HCl - 500 MG Oral Tablet TAKE TWO TABLETS BY MOUTH TWICE DAILY    Discussion/Summary   Phone call to patient discussed lab results  Fasting blood sugar is elevated and overall blood sugar control, hemoglobin A1c, is very poor at 11 4  Patient admits to not taking his metformin regularly recently as he was running out of medication  Patient has resumed metformin 500 mg 1 twice a day  Recommend patient increase metformin to 500 mg 1 tablet 3 times a day for 2 weeks, then increase to 2 tablets twice a day

## 2018-01-15 VITALS
SYSTOLIC BLOOD PRESSURE: 148 MMHG | RESPIRATION RATE: 16 BRPM | WEIGHT: 234 LBS | TEMPERATURE: 97.6 F | DIASTOLIC BLOOD PRESSURE: 100 MMHG | HEART RATE: 76 BPM | BODY MASS INDEX: 36.73 KG/M2 | HEIGHT: 67 IN

## 2018-01-17 NOTE — MISCELLANEOUS
Message  Return to work or school:   Kinsey Valadez is under my professional care  He was seen in my office on 3/21/17       Patient is not cleared to lift/roll/push/pull anything heavier than 15 lbs with his right hand  He is to keep his hand rested in a wrist brace while working until further clearance  Deb Soto MD       Future Appointments    Signatures   Electronically signed by : BRANDON Montes ; Mar 21 2017  5:30PM EST                       (Author)    Electronically signed by :  BRANDON Winn ; Apr  3 2017  1:39PM EST                       (Author)

## 2018-01-18 NOTE — RESULT NOTES
Discussion/Summary   Phone call to patient discussed lab results  Fasting blood sugar is improved and overall blood sugar control, hemoglobin A1c, is signficantly improved although remains elevated at 8 5  Patient to add glimepiride 2 mg daily  Verified Results  (Q) COMPREHENSIVE METABOLIC PNL W/ADJUSTED CALCIUM [id number] 12:00AM Ottoniel Aminta     Test Name Result Flag Reference   GLUCOSE 144 mg/dL H 65-99   Fasting reference interval     For someone without known diabetes, a glucose  value >125 mg/dL indicates that they may have  diabetes and this should be confirmed with a  follow-up test    UREA NITROGEN (BUN) 14 mg/dL  7-25   CREATININE 0 84 mg/dL  0 70-1 25   For patients >52years of age, the reference limit  for Creatinine is approximately 13% higher for people  identified as -American  eGFR NON-AFR  AMERICAN 95 mL/min/1 73m2  > OR = 60   eGFR AFRICAN AMERICAN 110 mL/min/1 73m2  > OR = 60   BUN/CREATININE RATIO   9-58   NOT APPLICABLE (calc)   SODIUM 137 mmol/L  135-146   POTASSIUM 4 2 mmol/L  3 5-5 3   CHLORIDE 101 mmol/L     CARBON DIOXIDE 26 mmol/L  20-31   CALCIUM 9 6 mg/dL  8 6-10 3   CALCIUM (ADJUSTED FOR$ALBUMIN) 9 6 mg/dL (calc)  8 6-10 2   PROTEIN, TOTAL 7 0 g/dL  6 1-8 1   ALBUMIN 4 4 g/dL  3 6-5 1   GLOBULIN 2 6 g/dL (calc)  1 9-3 7   ALBUMIN/GLOBULIN RATIO 1 7 (calc)  1 0-2 5   BILIRUBIN, TOTAL 0 4 mg/dL  0 2-1 2   ALKALINE PHOSPHATASE 79 U/L     AST 18 U/L  10-35   ALT 39 U/L  9-46     (Q) LIPID PANEL WITH REFLEX TO DIRECT LDL [id number] 12:00AM Ottoniel Aminta     Test Name Result Flag Reference   CHOLESTEROL, TOTAL 157 mg/dL  <200   HDL CHOLESTEROL 52 mg/dL  >55   TRIGLICERIDES 99 mg/dL  <442   LDL-CHOLESTEROL 86 mg/dL (calc)     Reference range: <100     Desirable range <100 mg/dL for patients with CHD or  diabetes and <70 mg/dL for diabetic patients with  known heart disease       LDL-C is now calculated using the Herbert-Keller   calculation, which is a validated novel method providing   better accuracy than the Friedewald equation in the   estimation of LDL-C  Artie Vail  [facility]  0186;058(72): [phone]   (http://eSolar/faq/COR616)   CHOL/HDLC RATIO 3 0 (calc)  <5 0   NON HDL CHOLESTEROL 105 mg/dL (calc)  <130   For patients with diabetes plus 1 major ASCVD risk   factor, treating to a non-HDL-C goal of <100 mg/dL   (LDL-C of <70 mg/dL) is considered a therapeutic   option  (Q) HEMOGLOBIN A1c [id number] 12:00AM [facility]     Test Name Result Flag Reference   HEMOGLOBIN A1c 8 5 % of total Hgb H <5 7   For someone without known diabetes, a hemoglobin A1c  value of 6 5% or greater indicates that they may have   diabetes and this should be confirmed with a follow-up   test      For someone with known diabetes, a value <7% indicates   that their diabetes is well controlled and a value   greater than or equal to 7% indicates suboptimal   control  A1c targets should be individualized based on   duration of diabetes, age, comorbid conditions, and   other considerations  Currently, no consensus exists regarding use of  hemoglobin A1c for diagnosis of diabetes for children         Plan  DM type 2, not at goal    · Glimepiride 2 MG Oral Tablet; TAKE 1 TABLET DAILY AS DIRECTED

## 2018-01-23 VITALS
HEART RATE: 80 BPM | WEIGHT: 227 LBS | SYSTOLIC BLOOD PRESSURE: 136 MMHG | BODY MASS INDEX: 35.63 KG/M2 | TEMPERATURE: 97.4 F | HEIGHT: 67 IN | RESPIRATION RATE: 16 BRPM | DIASTOLIC BLOOD PRESSURE: 88 MMHG

## 2018-01-23 NOTE — MISCELLANEOUS
Message  Return to work or school:   Sheela Garcia is under my professional care  He was seen in my office on 12/4/17       Please excuse 12/4/17 and 12/5/17          Signatures   Electronically signed by : Yordan Weaver, ; Dec  4 2017 10:11AM EST                       (Author)

## 2018-01-23 NOTE — CONSULTS
Chief Complaint    1  Hand Problem    History of Present Illness  Hand Pain: The patient is being seen for follow-up of a hand problem affecting the right hand  Current Symptoms include pain, hand  weakness, decreased pinch strength, stiffness, swelling and tendernessno numbness  HPI: Patient is a 65year old male who presents today for follow up of fifth metacarpal fracture of right hand  He was placed in an ulnar gutter splint for the past 4 weeks and has been compliant with it  He has improvement of his pain, but does have stiffness and swelling  He denies any new injuries and also denies any numbness,tingling, weakness  Hand Problem: Carmela Lira presents with complaints of hand problem  Associated symptoms include pain, swelling and stiffness  Review of Systems    Constitutional: No fever or chills, feels well, no tiredness, no recent weight loss or weight gain  Eyes: No complaints of red eyes, no eyesight problems  ENT: no complaints of loss of hearing, no nosebleeds, no sore throat  Cardiovascular: No complaints of chest pain, no palpitations, no leg claudication or lower extremity edema  Respiratory: No complaints of shortness of breath, no wheezing, no cough  Gastrointestinal: No complaints of abdominal pain, no constipation, no nausea or vomiting, no diarrhea or bloody stools  Genitourinary: No complaints of dysuria or incontinence, no hesitancy, no nocturia  Musculoskeletal: as noted in HPI  Integumentary: No complaints of skin rash or lesion, no itching or dry skin, no skin wounds  Neurological: No complaints of headache, no confusion, no numbness or tingling, no dizziness  Psychiatric: No suicidal thoughts, no anxiety, no depression  Endocrine: No muscle weakness, no frequent urination, no excessive thirst, no feelings of weakness  ROS reviewed  Active Problems    1  Acute bronchitis (466 0) (J20 9)   2  Acute sinusitis (461 9) (J01 90)   3   Benign essential hypertension (401 1) (I10)   4  Closed disp fracture of base of fifth metacarpal bone of right hand (815 02) (S62 316A)   5  DM type 2, not at goal (250 00) (E11 9)   6  Encounter for prostate cancer screening (V76 44) (Z12 5)   7  Enlarged prostate without lower urinary tract symptoms (luts) (600 00) (N40 0)   8  Erectile dysfunction of non-organic origin (302 72) (F52 21)   9  Glucose Intolerance (271 3)   10  Hypercholesterolemia (272 0) (E78 00)   11  Hyperlipidemia (272 4) (E78 5)   12  Obesity (278 00) (E66 9)   13  Osteoarthritis (715 90) (M19 90)   14  Osteoarthrosis of knee (715 36) (M17 10)   15  Preoperative examination (V72 84) (Z01 818)   16  Rhinitis (472 0) (J31 0)   17  Visit for pre-operative examination (V7 84) (Z01 818)   18  Vitamin D deficiency (268 9) (E55 9)    Past Medical History    The active problems and past medical history were reviewed and updated today  Surgical History    The surgical history was reviewed and updated today  Family History    · Family history of Diabetes Mellitus (V18 0)   · Family history of Mother  At Age 76    · Family history of Father  At Age 76   · Family history of Prostate Cancer (V16 42)    · Family history of CABG    · Denied: Family history of Colon Cancer   · Denied: Family history of Stroke Syndrome    The family history was reviewed and updated today  Social History    · Being A Social Drinker   · Caffeine Use   · Never a smoker   · Never A Smoker  The social history was reviewed and updated today  The social history was reviewed and is unchanged  Current Meds   1  Aspirin EC 81 MG Oral Tablet Delayed Release; TAKE 1 TABLET DAILY AS DIRECTED; Therapy: [id number] to Recorded   2  Lisinopril-Hydrochlorothiazide 20-25 MG Oral Tablet; TAKE 1 TABLET DAILY; Therapy: [id number] to (Evaluate:2017)  Requested for: 97OJW7831; Last   Rx:2016 Ordered   3   Meloxicam 15 MG Oral Tablet; TAKE 1 TABLET DAILY WITH FOOD; Therapy: 61FUB5222 to (Evaluate:04Jun2017)  Requested for: 63PVW9287; Last   Rx:09Jun2016 Ordered   4  MetFORMIN HCl - 500 MG Oral Tablet; TAKE TWO TABLETS BY MOUTH TWICE DAILY; Therapy: 98XZE8895 to (Evaluate:23Jun2017)  Requested for: 28Jun2016; Last   Rx:28Jun2016 Ordered   5  Pravastatin Sodium 80 MG Oral Tablet; Take 1 tablet daily; Therapy: 91KZM5347 to (Evaluate:04Jun2017)  Requested for: 46CNS8595; Last   Rx:09Jun2016 Ordered   6  Vitamin D 2000 UNIT Oral Capsule; 1 qd; Therapy: 21NCR8363 to Recorded    The medication list was reviewed and updated today  Allergies    1  No Known Drug Allergies    Vitals   Recorded: 21Mar2017 01:07PM   Heart Rate 306   Systolic 605   Diastolic 98   Height 5 ft 7 in   Weight 234 lb    BMI Calculated 36 65   BSA Calculated 2 16   Pain Scale 1     Physical Exam    Right Hand: Appearance: swelling at the 5 metacarpal(s)  Tenderness: diffuse dorsal aspect of the hand and metacarpal(s)  Palpatory findings include no crepitus  Right Wrist: Appearance: swelling, but no anatomic snuff box swelling  Tenderness: None  ROM: Full  Radial deviation: painful restricted AROM   Motor: Normal    Constitutional - General appearance: Normal    Musculoskeletal - Gait and station: Normal  Muscle strength/tone: Normal  Upper extremity compartments: Normal  Lower extremity compartments: Normal    Cardiovascular - Pulses: Normal    Neurologic - Sensation: Normal  Upper extremity peripheral neuro exam: Normal  Lower extremity peripheral neuro exam: Normal    Psychiatric - Orientation to person, place, and time: Normal  Mood and affect: Normal    Eyes   Conjunctiva and lids: Normal        Results/Data  Louise Cuenca - Eye Exam [id number] 12:00AM      Test Name Result Flag Reference   Retinopathy Screening      No Retinopathy on exam     Summary / No summary entered :      No summary entered  Documents attached :      sOpthalmology - Chris Simon: [id number] - Image Encounter - [facility]) (Result Document)    Assessment    1  Closed disp fracture of base of fifth metacarpal bone of right hand (815 02) (W60 137A)    Plan  Closed disp fracture of base of fifth metacarpal bone of right hand    · Follow-up Visit in 4 Weeks Evaluation and Treatment  Follow-up  Status: Hold For -  Scheduling  Requested for: 21Mar2017   · Wrist splint; Status:Complete;   Done: [id number]   · *1 - [facility] Physician Referral  Consult  Status: Active   Requested for: 21Mar2017  Care Summary provided  : Yes  DM type 2, not at goal    · *VB - Eye Exam ; every 1 year; Last 86WHX1641; Next [id number]; Status:Active    Discussion/Summary    Patient presents for follow up evaluation of right hand fracture  He has been healing well  At this time we will transition out of the splint into a regular wrist brace on an as needed basis  He was referred to Hand Therapy  He is to avoid any lifting, pushing, pulling, rolling of objects more than 15 lbs  He will wear the brace while working  His Disability form was filled and faxed  He will follow up in 4 weeks  The patient was counseled regarding instructions for management, patient and family education  Signatures   Electronically signed by : BRANDON Winkler ; Mar 21 2017  5:30PM EST                       (Author)    Electronically signed by :  BRANDON Dobbs ; Apr  3 2017  1:39PM EST                       (Author)

## 2018-01-24 NOTE — PROGRESS NOTES
Assessment    1  Closed disp fracture of base of fifth metacarpal bone of right hand (815 02) (P21 073G)    Plan  Closed disp fracture of base of fifth metacarpal bone of right hand    · Follow-up Visit in 4 Weeks Evaluation and Treatment  Follow-up  Status: Hold For -  Scheduling  Requested for: 21Mar2017   · Wrist splint; Status:Complete;   Done: [id number]   · *1 - [facility] Physician Referral  Consult  Status: Active   Requested for: 21Mar2017  Care Summary provided  : Yes  DM type 2, not at goal    · *VB - Eye Exam ; every 1 year; Last 30ZYT2737; Next 64OHF5418; Status:Active    Discussion/Summary    Patient presents for follow up evaluation of right hand fracture  He has been healing well  At this time we will transition out of the splint into a regular wrist brace on an as needed basis  He was referred to Hand Therapy  He is to avoid any lifting, pushing, pulling, rolling of objects more than 15 lbs  He will wear the brace while working  His Disability form was filled and faxed  He will follow up in 4 weeks  The patient was counseled regarding instructions for management, patient and family education  Chief Complaint    1  Hand Problem    History of Present Illness  Hand Pain: The patient is being seen for follow-up of a hand problem affecting the right hand  Current Symptoms include pain, hand  weakness, decreased pinch strength, stiffness, swelling and tendernessno numbness  HPI: Patient is a 65year old male who presents today for follow up of fifth metacarpal fracture of right hand  He was placed in an ulnar gutter splint for the past 4 weeks and has been compliant with it  He has improvement of his pain, but does have stiffness and swelling  He denies any new injuries and also denies any numbness,tingling, weakness  Hand Problem: Robson Lozano presents with complaints of hand problem  Associated symptoms include pain, swelling and stiffness        Review of Systems    Constitutional: No fever or chills, feels well, no tiredness, no recent weight loss or weight gain  Eyes: No complaints of red eyes, no eyesight problems  ENT: no complaints of loss of hearing, no nosebleeds, no sore throat  Cardiovascular: No complaints of chest pain, no palpitations, no leg claudication or lower extremity edema  Respiratory: No complaints of shortness of breath, no wheezing, no cough  Gastrointestinal: No complaints of abdominal pain, no constipation, no nausea or vomiting, no diarrhea or bloody stools  Genitourinary: No complaints of dysuria or incontinence, no hesitancy, no nocturia  Musculoskeletal: as noted in HPI  Integumentary: No complaints of skin rash or lesion, no itching or dry skin, no skin wounds  Neurological: No complaints of headache, no confusion, no numbness or tingling, no dizziness  Psychiatric: No suicidal thoughts, no anxiety, no depression  Endocrine: No muscle weakness, no frequent urination, no excessive thirst, no feelings of weakness  ROS reviewed  Active Problems    1  Acute bronchitis (466 0) (J20 9)   2  Acute sinusitis (461 9) (J01 90)   3  Benign essential hypertension (401 1) (I10)   4  Closed disp fracture of base of fifth metacarpal bone of right hand (815 02) (S62 316A)   5  DM type 2, not at goal (250 00) (E11 9)   6  Encounter for prostate cancer screening (V76 44) (Z12 5)   7  Enlarged prostate without lower urinary tract symptoms (luts) (600 00) (N40 0)   8  Erectile dysfunction of non-organic origin (302 72) (F52 21)   9  Glucose Intolerance (271 3)   10  Hypercholesterolemia (272 0) (E78 00)   11  Hyperlipidemia (272 4) (E78 5)   12  Obesity (278 00) (E66 9)   13  Osteoarthritis (715 90) (M19 90)   14  Osteoarthrosis of knee (715 36) (M17 10)   15  Preoperative examination (V72 84) (Z01 818)   16  Rhinitis (472 0) (J31 0)   17  Visit for pre-operative examination (V72 84) (Z01 818)   18   Vitamin D deficiency (268 9) (E53 9)    Past Medical History    The active problems and past medical history were reviewed and updated today  Surgical History    The surgical history was reviewed and updated today  Family History  Mother    · Family history of Diabetes Mellitus (V18 0)   · Family history of Mother  At Age 76  Father    · Family history of Father  At Age 76   · Family history of Prostate Cancer (V16 42)  Maternal Uncle    · Family history of CABG  Family History    · Denied: Family history of Colon Cancer   · Denied: Family history of Stroke Syndrome    The family history was reviewed and updated today  Social History    · Being A Social Drinker   · Caffeine Use   · Never a smoker   · Never A Smoker  The social history was reviewed and updated today  The social history was reviewed and is unchanged  Current Meds   1  Aspirin EC 81 MG Oral Tablet Delayed Release; TAKE 1 TABLET DAILY AS DIRECTED; Therapy: 72MRS4102 to Recorded   2  Lisinopril-Hydrochlorothiazide 20-25 MG Oral Tablet; TAKE 1 TABLET DAILY; Therapy: 58AMI6743 to (Evaluate:2017)  Requested for: 36OCT1311; Last   Rx:2016 Ordered   3  Meloxicam 15 MG Oral Tablet; TAKE 1 TABLET DAILY WITH FOOD; Therapy: 54GHX2603 to (Evaluate:2017)  Requested for: 70HOO9149; Last   Rx:2016 Ordered   4  MetFORMIN HCl - 500 MG Oral Tablet; TAKE TWO TABLETS BY MOUTH TWICE DAILY; Therapy: 32TVJ2726 to (Evaluate:2017)  Requested for: 2016; Last   Rx:2016 Ordered   5  Pravastatin Sodium 80 MG Oral Tablet; Take 1 tablet daily; Therapy: 51HPR3804 to (Evaluate:2017)  Requested for: 39YLZ0565; Last   Rx:2016 Ordered   6  Vitamin D 2000 UNIT Oral Capsule; 1 qd; Therapy: 37RBH1217 to Recorded    The medication list was reviewed and updated today  Allergies    1   No Known Drug Allergies    Vitals   Recorded: 2017 01:07PM   Heart Rate 344   Systolic 748   Diastolic 98   Height 5 ft 7 in Weight 234 lb    BMI Calculated 36 65   BSA Calculated 2 16   Pain Scale 1     Physical Exam    Right Hand: Appearance: swelling at the 5 metacarpal(s)  Tenderness: diffuse dorsal aspect of the hand and metacarpal(s)  Palpatory findings include no crepitus  Right Wrist: Appearance: swelling, but no anatomic snuff box swelling  Tenderness: None  ROM: Full  Radial deviation: painful restricted AROM  Motor: Normal    Constitutional - General appearance: Normal    Musculoskeletal - Gait and station: Normal  Muscle strength/tone: Normal  Upper extremity compartments: Normal  Lower extremity compartments: Normal    Cardiovascular - Pulses: Normal    Neurologic - Sensation: Normal  Upper extremity peripheral neuro exam: Normal  Lower extremity peripheral neuro exam: Normal    Psychiatric - Orientation to person, place, and time: Normal  Mood and affect: Normal    Eyes   Conjunctiva and lids: Normal        Results/Data  *VB - Eye Exam 37XLE7973 12:00AM      Test Name Result Flag Reference   Retinopathy Screening      No Retinopathy on exam     Summary / No summary entered :      No summary entered  Documents attached :      sOpthalmology - Kemper Rubinstein; Enc: [id number] - Image Encounter - Kemper Rubinstein      - [facility]) (Result Document)    Attending Note  Attending Note [facility]: Attending Note: I interviewed, took the history and examined the patient, I discussed the case with the Resident and reviewed the Resident's note, I supervised the Resident and I agree with the Resident management plan as it was presented to me  Level of Participation: I was present in clinic and examined the patient  I agree with the Resident's note  Message  Return to work or school:   Skinny Laithayo is under my professional care  He was seen in my office on 3/21/17       Patient is not cleared to lift/roll/push/pull anything heavier than 15 lbs with his right hand   He is to keep his hand rested in a wrist brace while working until further clearance  Mayte Hernández MD       Future Appointments    Date/Time Provider Specialty Site   04/18/2017 01:20 PM BRANDON Lan  Orthopedic Surgery [facility]     Signatures   Electronically signed by : BRANDON Nicolas ; Mar 21 2017  5:30PM EST                       (Author)    Electronically signed by :  BRANDON Frye ; Apr  3 2017  1:39PM EST                       (Author)

## 2018-04-23 ENCOUNTER — OFFICE VISIT (OUTPATIENT)
Dept: FAMILY MEDICINE CLINIC | Facility: CLINIC | Age: 63
End: 2018-04-23
Payer: COMMERCIAL

## 2018-04-23 VITALS
DIASTOLIC BLOOD PRESSURE: 82 MMHG | HEART RATE: 84 BPM | RESPIRATION RATE: 16 BRPM | TEMPERATURE: 98.3 F | HEIGHT: 67 IN | WEIGHT: 235 LBS | BODY MASS INDEX: 36.88 KG/M2 | SYSTOLIC BLOOD PRESSURE: 140 MMHG

## 2018-04-23 DIAGNOSIS — J20.9 ACUTE BRONCHITIS, UNSPECIFIED ORGANISM: Primary | ICD-10-CM

## 2018-04-23 PROCEDURE — 3008F BODY MASS INDEX DOCD: CPT | Performed by: FAMILY MEDICINE

## 2018-04-23 PROCEDURE — 99213 OFFICE O/P EST LOW 20 MIN: CPT | Performed by: FAMILY MEDICINE

## 2018-04-23 PROCEDURE — 1036F TOBACCO NON-USER: CPT | Performed by: FAMILY MEDICINE

## 2018-04-23 RX ORDER — MULTIVIT-MIN/IRON/FOLIC ACID/K 18-600-40
1 CAPSULE ORAL DAILY
COMMUNITY
Start: 2014-02-27

## 2018-04-23 RX ORDER — ASPIRIN 81 MG/1
1 TABLET ORAL DAILY
COMMUNITY
Start: 2013-03-08

## 2018-04-23 RX ORDER — LISINOPRIL AND HYDROCHLOROTHIAZIDE 25; 20 MG/1; MG/1
1 TABLET ORAL DAILY
COMMUNITY
Start: 2015-05-22 | End: 2018-04-25 | Stop reason: SDUPTHER

## 2018-04-23 RX ORDER — AMLODIPINE BESYLATE 5 MG/1
1 TABLET ORAL DAILY
COMMUNITY
Start: 2017-05-22 | End: 2018-04-25 | Stop reason: SDUPTHER

## 2018-04-23 RX ORDER — MELOXICAM 15 MG/1
1 TABLET ORAL DAILY
COMMUNITY
Start: 2015-05-22 | End: 2018-04-25 | Stop reason: SDUPTHER

## 2018-04-23 RX ORDER — AZITHROMYCIN 250 MG/1
TABLET, FILM COATED ORAL
Qty: 6 TABLET | Refills: 0 | Status: SHIPPED | OUTPATIENT
Start: 2018-04-23 | End: 2018-04-28

## 2018-04-23 RX ORDER — PRAVASTATIN SODIUM 80 MG/1
1 TABLET ORAL DAILY
COMMUNITY
Start: 2015-05-22 | End: 2018-04-25 | Stop reason: SDUPTHER

## 2018-04-23 RX ORDER — GLIMEPIRIDE 2 MG/1
1 TABLET ORAL DAILY
COMMUNITY
Start: 2017-11-24 | End: 2018-04-25 | Stop reason: SDUPTHER

## 2018-04-23 RX ORDER — BENZONATATE 200 MG/1
200 CAPSULE ORAL 3 TIMES DAILY PRN
Qty: 20 CAPSULE | Refills: 0 | Status: SHIPPED | OUTPATIENT
Start: 2018-04-23 | End: 2019-04-25

## 2018-04-23 RX ORDER — LANCETS 28 GAUGE
EACH MISCELLANEOUS DAILY
COMMUNITY
Start: 2017-05-24

## 2018-04-23 NOTE — PROGRESS NOTES
Assessment/Plan:  Patient will be started on a Z-Reese and Tessalon Perles 200 milligrams 1 t i d  p r n  Vince Mehta Patient may continue Mucinex and increase fluids and rest   Return to the office in 1 week or sooner p r n  No problem-specific Assessment & Plan notes found for this encounter  Diagnoses and all orders for this visit:    Acute bronchitis, unspecified organism  Comments:  Z-Reese and Tessalon Perles p r n   Orders:  -     azithromycin (ZITHROMAX) 250 mg tablet; Take 2 tablets today then 1 tablet daily x 4 days  -     benzonatate (TESSALON) 200 MG capsule; Take 1 capsule (200 mg total) by mouth 3 (three) times a day as needed for cough    Other orders  -     amLODIPine (NORVASC) 5 mg tablet; Take 1 tablet by mouth daily  -     aspirin (ECOTRIN LOW STRENGTH) 81 mg EC tablet; Take 1 tablet by mouth daily  -     Lancets (FREESTYLE) lancets; by Does not apply route daily  -     glucose blood (FREESTYLE LITE) test strip; 1 Squirt by In Vitro route daily  -     glimepiride (AMARYL) 2 mg tablet; Take 1 tablet by mouth daily  -     lisinopril-hydrochlorothiazide (PRINZIDE,ZESTORETIC) 20-25 MG per tablet; Take 1 tablet by mouth daily  -     meloxicam (MOBIC) 15 mg tablet; Take 1 tablet by mouth Daily  -     pravastatin (PRAVACHOL) 80 mg tablet; Take 1 tablet by mouth daily  -     Cholecalciferol (VITAMIN D) 2000 units CAPS; Take 1 capsule by mouth daily  -     metFORMIN (GLUCOPHAGE) 500 mg tablet; Take 2 tablets by mouth 2 (two) times a day          Subjective:      Patient ID: Deacon Kirk is a 59 y o  male  Patient started over 1 week ago with cold symptoms  He now complains of nasal congestion and cough productive of yellow mucus  Patient denies fever  He has treated this with Mucinex without significant relief  URI    This is a new problem  The current episode started 1 to 4 weeks ago  There has been no fever   Associated symptoms include congestion, coughing, headaches, rhinorrhea, a sore throat and wheezing  Pertinent negatives include no diarrhea, ear pain, nausea, plugged ear sensation, sinus pain, sneezing or vomiting  The treatment provided mild relief  The following portions of the patient's history were reviewed and updated as appropriate: allergies, current medications, past family history, past medical history, past social history, past surgical history and problem list     Review of Systems   HENT: Positive for congestion, rhinorrhea and sore throat  Negative for ear pain, sinus pain and sneezing  Respiratory: Positive for cough and wheezing  Gastrointestinal: Negative for diarrhea, nausea and vomiting  Neurological: Positive for headaches  Objective:      /82   Pulse 84   Temp 98 3 °F (36 8 °C) (Tympanic)   Resp 16   Ht 5' 7" (1 702 m)   Wt 107 kg (235 lb)   BMI 36 81 kg/m²          Physical Exam   Constitutional: He is oriented to person, place, and time  He appears well-developed and well-nourished  HENT:   Head: Normocephalic  Right Ear: External ear normal    Left Ear: External ear normal    Positive turbinates swelling with mucoid drainage  Throat positive postnasal drainage and injected  Eyes: Conjunctivae are normal  No scleral icterus  Neck: Neck supple  Cardiovascular: Normal rate and regular rhythm  Pulmonary/Chest: Effort normal and breath sounds normal  He has no wheezes  Abdominal: Soft  There is no tenderness  Musculoskeletal: He exhibits no edema  Lymphadenopathy:     He has no cervical adenopathy  Neurological: He is alert and oriented to person, place, and time  Skin: Skin is warm and dry  Psychiatric: He has a normal mood and affect

## 2018-04-23 NOTE — LETTER
April 23, 2018     Patient: Johnie Median   YOB: 1955   Date of Visit: 4/23/2018       To Whom it May Concern:    Luis E Baird is under my professional care  He was seen in my office on 4/23/2018  He may return to work on 04/25/2018  If you have any questions or concerns, please don't hesitate to call           Sincerely,          Rob Rowe DO        CC: No Recipients

## 2018-04-25 DIAGNOSIS — I10 ESSENTIAL HYPERTENSION, BENIGN: Primary | ICD-10-CM

## 2018-04-25 DIAGNOSIS — E78.5 HYPERLIPIDEMIA, UNSPECIFIED HYPERLIPIDEMIA TYPE: ICD-10-CM

## 2018-04-25 DIAGNOSIS — M15.9 PRIMARY OSTEOARTHRITIS INVOLVING MULTIPLE JOINTS: ICD-10-CM

## 2018-04-25 DIAGNOSIS — E11.9 TYPE 2 DIABETES MELLITUS WITHOUT COMPLICATION, WITHOUT LONG-TERM CURRENT USE OF INSULIN (HCC): ICD-10-CM

## 2018-04-25 PROBLEM — M25.511 BILATERAL SHOULDER PAIN: Status: ACTIVE | Noted: 2017-11-22

## 2018-04-25 PROBLEM — M25.512 BILATERAL SHOULDER PAIN: Status: ACTIVE | Noted: 2017-11-22

## 2018-04-25 PROBLEM — S62.316A CLOSED DISP FRACTURE OF BASE OF FIFTH METACARPAL BONE OF RIGHT HAND: Status: ACTIVE | Noted: 2017-02-21

## 2018-04-25 RX ORDER — PRAVASTATIN SODIUM 80 MG/1
80 TABLET ORAL DAILY
Qty: 90 TABLET | Refills: 3 | Status: SHIPPED | OUTPATIENT
Start: 2018-04-25 | End: 2019-04-25 | Stop reason: SDUPTHER

## 2018-04-25 RX ORDER — MELOXICAM 15 MG/1
15 TABLET ORAL DAILY
Qty: 90 TABLET | Refills: 3 | Status: SHIPPED | OUTPATIENT
Start: 2018-04-25 | End: 2019-04-25 | Stop reason: SDUPTHER

## 2018-04-25 RX ORDER — LISINOPRIL AND HYDROCHLOROTHIAZIDE 25; 20 MG/1; MG/1
1 TABLET ORAL DAILY
Qty: 90 TABLET | Refills: 3 | Status: SHIPPED | OUTPATIENT
Start: 2018-04-25 | End: 2019-04-25 | Stop reason: SDUPTHER

## 2018-04-25 RX ORDER — GLIMEPIRIDE 2 MG/1
2 TABLET ORAL DAILY
Qty: 90 TABLET | Refills: 3 | Status: SHIPPED | OUTPATIENT
Start: 2018-04-25 | End: 2019-04-25 | Stop reason: SDUPTHER

## 2018-04-25 RX ORDER — AMLODIPINE BESYLATE 5 MG/1
5 TABLET ORAL DAILY
Qty: 90 TABLET | Refills: 3 | Status: SHIPPED | OUTPATIENT
Start: 2018-04-25 | End: 2019-04-25 | Stop reason: SDUPTHER

## 2018-06-01 ENCOUNTER — TELEPHONE (OUTPATIENT)
Dept: FAMILY MEDICINE CLINIC | Facility: CLINIC | Age: 63
End: 2018-06-01

## 2018-06-01 NOTE — TELEPHONE ENCOUNTER
Patient states he finished Lavelle Weinstein but does not feel better request if another prescription can be sent to his pharmacy

## 2019-04-25 ENCOUNTER — OFFICE VISIT (OUTPATIENT)
Dept: FAMILY MEDICINE CLINIC | Facility: CLINIC | Age: 64
End: 2019-04-25
Payer: COMMERCIAL

## 2019-04-25 VITALS
WEIGHT: 220 LBS | HEART RATE: 72 BPM | SYSTOLIC BLOOD PRESSURE: 138 MMHG | TEMPERATURE: 97.1 F | HEIGHT: 67 IN | DIASTOLIC BLOOD PRESSURE: 84 MMHG | RESPIRATION RATE: 16 BRPM | BODY MASS INDEX: 34.53 KG/M2

## 2019-04-25 DIAGNOSIS — E55.9 VITAMIN D DEFICIENCY: ICD-10-CM

## 2019-04-25 DIAGNOSIS — Z12.5 SCREENING PSA (PROSTATE SPECIFIC ANTIGEN): ICD-10-CM

## 2019-04-25 DIAGNOSIS — E11.9 DM TYPE 2, NOT AT GOAL (HCC): ICD-10-CM

## 2019-04-25 DIAGNOSIS — E11.9 TYPE 2 DIABETES MELLITUS WITHOUT COMPLICATION, WITHOUT LONG-TERM CURRENT USE OF INSULIN (HCC): Primary | ICD-10-CM

## 2019-04-25 DIAGNOSIS — I10 ESSENTIAL HYPERTENSION, BENIGN: ICD-10-CM

## 2019-04-25 DIAGNOSIS — E66.09 CLASS 1 OBESITY DUE TO EXCESS CALORIES WITH SERIOUS COMORBIDITY AND BODY MASS INDEX (BMI) OF 34.0 TO 34.9 IN ADULT: ICD-10-CM

## 2019-04-25 DIAGNOSIS — M15.9 PRIMARY OSTEOARTHRITIS INVOLVING MULTIPLE JOINTS: ICD-10-CM

## 2019-04-25 DIAGNOSIS — I10 BENIGN ESSENTIAL HYPERTENSION: ICD-10-CM

## 2019-04-25 DIAGNOSIS — E78.5 HYPERLIPIDEMIA, UNSPECIFIED HYPERLIPIDEMIA TYPE: ICD-10-CM

## 2019-04-25 PROBLEM — M17.10 OSTEOARTHRITIS OF KNEE: Status: ACTIVE | Noted: 2019-04-25

## 2019-04-25 PROBLEM — R26.9 ABNORMAL GAIT: Status: ACTIVE | Noted: 2019-04-25

## 2019-04-25 PROBLEM — M17.9 OSTEOARTHRITIS OF KNEE: Status: ACTIVE | Noted: 2019-04-25

## 2019-04-25 LAB — SL AMB POCT HEMOGLOBIN AIC: 6.6 (ref ?–6.5)

## 2019-04-25 PROCEDURE — 3008F BODY MASS INDEX DOCD: CPT | Performed by: FAMILY MEDICINE

## 2019-04-25 PROCEDURE — 3075F SYST BP GE 130 - 139MM HG: CPT | Performed by: FAMILY MEDICINE

## 2019-04-25 PROCEDURE — 3044F HG A1C LEVEL LT 7.0%: CPT | Performed by: FAMILY MEDICINE

## 2019-04-25 PROCEDURE — 99214 OFFICE O/P EST MOD 30 MIN: CPT | Performed by: FAMILY MEDICINE

## 2019-04-25 PROCEDURE — 36415 COLL VENOUS BLD VENIPUNCTURE: CPT | Performed by: FAMILY MEDICINE

## 2019-04-25 PROCEDURE — 3079F DIAST BP 80-89 MM HG: CPT | Performed by: FAMILY MEDICINE

## 2019-04-25 PROCEDURE — 83036 HEMOGLOBIN GLYCOSYLATED A1C: CPT | Performed by: FAMILY MEDICINE

## 2019-04-25 PROCEDURE — 1036F TOBACCO NON-USER: CPT | Performed by: FAMILY MEDICINE

## 2019-04-25 RX ORDER — LISINOPRIL AND HYDROCHLOROTHIAZIDE 25; 20 MG/1; MG/1
1 TABLET ORAL DAILY
Qty: 90 TABLET | Refills: 3 | Status: SHIPPED | OUTPATIENT
Start: 2019-04-25 | End: 2020-03-30

## 2019-04-25 RX ORDER — MELOXICAM 15 MG/1
15 TABLET ORAL DAILY
Qty: 90 TABLET | Refills: 3 | Status: SHIPPED | OUTPATIENT
Start: 2019-04-25 | End: 2020-03-30

## 2019-04-25 RX ORDER — PRAVASTATIN SODIUM 80 MG/1
80 TABLET ORAL DAILY
Qty: 90 TABLET | Refills: 3 | Status: SHIPPED | OUTPATIENT
Start: 2019-04-25 | End: 2020-03-30

## 2019-04-25 RX ORDER — AMLODIPINE BESYLATE 5 MG/1
5 TABLET ORAL DAILY
Qty: 90 TABLET | Refills: 3 | Status: SHIPPED | OUTPATIENT
Start: 2019-04-25 | End: 2020-03-30

## 2019-04-25 RX ORDER — GLIMEPIRIDE 2 MG/1
2 TABLET ORAL DAILY
Qty: 90 TABLET | Refills: 3 | Status: SHIPPED | OUTPATIENT
Start: 2019-04-25 | End: 2020-03-30

## 2019-04-26 LAB
25(OH)D3 SERPL-MCNC: 44 NG/ML (ref 30–100)
ALBUMIN SERPL-MCNC: 4.7 G/DL (ref 3.6–5.1)
ALBUMIN/CREAT UR: 33 MCG/MG CREAT
ALBUMIN/GLOB SERPL: 2 (CALC) (ref 1–2.5)
ALP SERPL-CCNC: 67 U/L (ref 40–115)
ALT SERPL-CCNC: 39 U/L (ref 9–46)
AST SERPL-CCNC: 20 U/L (ref 10–35)
BILIRUB SERPL-MCNC: 0.5 MG/DL (ref 0.2–1.2)
BUN SERPL-MCNC: 16 MG/DL (ref 7–25)
BUN/CREAT SERPL: ABNORMAL (CALC) (ref 6–22)
CALCIUM SERPL-MCNC: 9.5 MG/DL (ref 8.6–10.3)
CHLORIDE SERPL-SCNC: 104 MMOL/L (ref 98–110)
CHOLEST SERPL-MCNC: 149 MG/DL
CHOLEST/HDLC SERPL: 3.1 (CALC)
CO2 SERPL-SCNC: 26 MMOL/L (ref 20–32)
CREAT SERPL-MCNC: 0.75 MG/DL (ref 0.7–1.25)
CREAT UR-MCNC: 72 MG/DL (ref 20–320)
ERYTHROCYTE [DISTWIDTH] IN BLOOD BY AUTOMATED COUNT: 12.4 % (ref 11–15)
GLOBULIN SER CALC-MCNC: 2.3 G/DL (CALC) (ref 1.9–3.7)
GLUCOSE SERPL-MCNC: 112 MG/DL (ref 65–99)
HCT VFR BLD AUTO: 43.3 % (ref 38.5–50)
HDLC SERPL-MCNC: 48 MG/DL
HGB BLD-MCNC: 14.5 G/DL (ref 13.2–17.1)
LDLC SERPL CALC-MCNC: 81 MG/DL (CALC)
MCH RBC QN AUTO: 29.8 PG (ref 27–33)
MCHC RBC AUTO-ENTMCNC: 33.5 G/DL (ref 32–36)
MCV RBC AUTO: 88.9 FL (ref 80–100)
MICROALBUMIN UR-MCNC: 2.4 MG/DL
NONHDLC SERPL-MCNC: 101 MG/DL (CALC)
PLATELET # BLD AUTO: 267 THOUSAND/UL (ref 140–400)
PMV BLD REES-ECKER: 11.4 FL (ref 7.5–12.5)
POTASSIUM SERPL-SCNC: 3.9 MMOL/L (ref 3.5–5.3)
PROT SERPL-MCNC: 7 G/DL (ref 6.1–8.1)
PSA SERPL-MCNC: 0.4 NG/ML
RBC # BLD AUTO: 4.87 MILLION/UL (ref 4.2–5.8)
SL AMB EGFR AFRICAN AMERICAN: 113 ML/MIN/1.73M2
SL AMB EGFR NON AFRICAN AMERICAN: 98 ML/MIN/1.73M2
SODIUM SERPL-SCNC: 139 MMOL/L (ref 135–146)
TRIGL SERPL-MCNC: 102 MG/DL
TSH SERPL-ACNC: 1.38 MIU/L (ref 0.4–4.5)
WBC # BLD AUTO: 8.2 THOUSAND/UL (ref 3.8–10.8)

## 2019-07-15 ENCOUNTER — OFFICE VISIT (OUTPATIENT)
Dept: FAMILY MEDICINE CLINIC | Facility: CLINIC | Age: 64
End: 2019-07-15
Payer: COMMERCIAL

## 2019-07-15 VITALS
WEIGHT: 220 LBS | HEART RATE: 76 BPM | RESPIRATION RATE: 16 BRPM | TEMPERATURE: 97.3 F | SYSTOLIC BLOOD PRESSURE: 144 MMHG | HEIGHT: 67 IN | BODY MASS INDEX: 34.53 KG/M2 | DIASTOLIC BLOOD PRESSURE: 82 MMHG

## 2019-07-15 DIAGNOSIS — E55.9 VITAMIN D DEFICIENCY: ICD-10-CM

## 2019-07-15 DIAGNOSIS — E78.5 HYPERLIPIDEMIA, UNSPECIFIED HYPERLIPIDEMIA TYPE: ICD-10-CM

## 2019-07-15 DIAGNOSIS — I10 ESSENTIAL HYPERTENSION: ICD-10-CM

## 2019-07-15 DIAGNOSIS — R31.0 GROSS HEMATURIA: ICD-10-CM

## 2019-07-15 DIAGNOSIS — N40.0 ENLARGED PROSTATE WITHOUT LOWER URINARY TRACT SYMPTOMS (LUTS): ICD-10-CM

## 2019-07-15 DIAGNOSIS — E11.9 DM TYPE 2, NOT AT GOAL (HCC): Primary | ICD-10-CM

## 2019-07-15 DIAGNOSIS — E66.09 CLASS 1 OBESITY DUE TO EXCESS CALORIES WITH SERIOUS COMORBIDITY AND BODY MASS INDEX (BMI) OF 34.0 TO 34.9 IN ADULT: ICD-10-CM

## 2019-07-15 DIAGNOSIS — M15.9 PRIMARY OSTEOARTHRITIS INVOLVING MULTIPLE JOINTS: ICD-10-CM

## 2019-07-15 LAB
SL AMB  POCT GLUCOSE, UA: NORMAL
SL AMB LEUKOCYTE ESTERASE,UA: ABNORMAL
SL AMB POCT BILIRUBIN,UA: NEGATIVE
SL AMB POCT BLOOD,UA: ABNORMAL
SL AMB POCT CLARITY,UA: ABNORMAL
SL AMB POCT COLOR,UA: YELLOW
SL AMB POCT HEMOGLOBIN AIC: 6.5 (ref ?–6.5)
SL AMB POCT KETONES,UA: NEGATIVE
SL AMB POCT NITRITE,UA: NEGATIVE
SL AMB POCT PH,UA: 5
SL AMB POCT SPECIFIC GRAVITY,UA: 1.01
SL AMB POCT URINE PROTEIN: ABNORMAL
SL AMB POCT UROBILINOGEN: NORMAL

## 2019-07-15 PROCEDURE — 83036 HEMOGLOBIN GLYCOSYLATED A1C: CPT | Performed by: FAMILY MEDICINE

## 2019-07-15 PROCEDURE — 3044F HG A1C LEVEL LT 7.0%: CPT | Performed by: FAMILY MEDICINE

## 2019-07-15 PROCEDURE — 99214 OFFICE O/P EST MOD 30 MIN: CPT | Performed by: FAMILY MEDICINE

## 2019-07-15 PROCEDURE — 81002 URINALYSIS NONAUTO W/O SCOPE: CPT | Performed by: FAMILY MEDICINE

## 2019-07-15 PROCEDURE — 3008F BODY MASS INDEX DOCD: CPT | Performed by: FAMILY MEDICINE

## 2019-07-15 NOTE — PROGRESS NOTES
Assessment/Plan:  UA reveals large blood and trace leukocytes  Urine sent for culture  Patient is being referred back to Dr Luis E Roy, urologist   Recommend increase fluids in the form of water  Patient to continue present treatment  Patient instructed to follow a low-fat, low-salt and a low sugar/carbohydrate diet and get regular aerobic exercise 150 minutes per week  Weight loss encouraged  Continue home glucose monitoring  Patient encouraged to schedule diabetic eye exam   Return to the office in 3 months  DM type 2, not at goal [facility])  Lab Results   Component Value Date    HGBA1C 6 5 07/15/2019    Good control with fingerstick hemoglobin A1c at 6 5  Continue present treatment  No results for input(s): POCGLU in the last 72 hours  Blood Sugar Average: Last 72 hrs:         Diagnoses and all orders for this visit:    DM type 2, not at goal [facility])  -     POCT hemoglobin A1c    Essential hypertension    Hyperlipidemia, unspecified hyperlipidemia type    Gross hematuria  Comments:  UA reveals large blood and trace leukocytes  Urine culture  Referral to Dr Luis E Roy, urologist   Orders:  -     POCT urine dip  -     Ambulatory referral to Urology; Future  -     Urine culture    Primary osteoarthritis involving multiple joints    Enlarged prostate without lower urinary tract symptoms (luts)    Class 1 obesity due to excess calories with serious comorbidity and body mass index (BMI) of 34 0 to 34 9 in adult    Vitamin D deficiency          Subjective:      Patient ID: Timothy Jon is a 62 y o  male  Patient is here for follow-up appointment for chronic conditions and we reviewed fasting labs from last appointment  Patient had been feeling fairly well overall except the past 2 weeks complains of intermittent right flank pain and dark, coffee colored, urine with occasional blood for the past 2 weeks    Patient admits to history of kidney stones many times and follows with Dr Luis E Roy, urologist   Symptoms occur when patient is overly physically active  Patient is overdue for diabetic eye exam and recommend he schedule appoint with Dr Devoria Gowers  Home glucose monitoring done occasionally reveals fasting blood sugars I 40 and 11:00 p m    He is up-to-date on diabetic foot exam     Diabetes   He presents for his follow-up diabetic visit  He has type 2 diabetes mellitus  His disease course has been stable  There are no hypoglycemic associated symptoms  Pertinent negatives for hypoglycemia include no headaches  Pertinent negatives for diabetes include no blurred vision, no chest pain, no fatigue, no foot paresthesias, no foot ulcerations, no polydipsia, no polyuria, no visual change, no weakness and no weight loss  There are no hypoglycemic complications  Symptoms are stable  Pertinent negatives for diabetic complications include no CVA, heart disease, nephropathy or peripheral neuropathy  Risk factors for coronary artery disease include family history, dyslipidemia, diabetes mellitus, hypertension, male sex and obesity  Current diabetic treatment includes oral agent (dual therapy)  He is compliant with treatment all of the time  His weight is stable  He is following a generally healthy diet  He participates in exercise intermittently  His home blood glucose trend is decreasing steadily  His breakfast blood glucose is taken between 7-8 am  His breakfast blood glucose range is generally 130-140 mg/dl  His bedtime blood glucose is taken between 10-11 pm  His bedtime blood glucose range is generally  mg/dl  He does not see a podiatrist Eye exam is not current  Hypertension   This is a chronic problem  The problem is controlled  Pertinent negatives include no anxiety, blurred vision, chest pain, headaches, orthopnea, palpitations, peripheral edema, PND or shortness of breath  Past treatments include ACE inhibitors, diuretics and calcium channel blockers  The current treatment provides significant improvement  Compliance problems include exercise and diet  There is no history of CAD/MI or CVA  The following portions of the patient's history were reviewed and updated as appropriate: allergies, current medications, past family history, past medical history, past social history, past surgical history and problem list     Review of Systems   Constitutional: Negative for fatigue and weight loss  Eyes: Negative for blurred vision  Respiratory: Negative for shortness of breath  Cardiovascular: Negative for chest pain, palpitations, orthopnea and PND  Endocrine: Negative for polydipsia and polyuria  Genitourinary: Positive for flank pain and hematuria  Negative for difficulty urinating, dysuria, frequency and urgency  Neurological: Negative for weakness and headaches  Objective:      /82   Pulse 76   Temp (!) 97 3 °F (36 3 °C)   Resp 16   Ht 5' 6 93" (1 7 m)   Wt 99 8 kg (220 lb)   BMI 34 53 kg/m²          Physical Exam   Constitutional: He is oriented to person, place, and time  He appears well-developed and well-nourished  No distress  HENT:   Head: Normocephalic  Right Ear: External ear normal    Left Ear: External ear normal    Nose: Nose normal    Mouth/Throat: Oropharynx is clear and moist    Eyes: Conjunctivae are normal  No scleral icterus  Neck: Neck supple  No thyromegaly present  Cardiovascular: Normal rate and regular rhythm  Pulmonary/Chest: Effort normal and breath sounds normal    Abdominal: Soft  There is no tenderness  Negative CVA tenderness  Musculoskeletal: He exhibits no edema  Lymphadenopathy:     He has no cervical adenopathy  Neurological: He is alert and oriented to person, place, and time  Skin: Skin is warm and dry  Psychiatric: He has a normal mood and affect

## 2019-07-15 NOTE — ASSESSMENT & PLAN NOTE
Lab Results   Component Value Date    HGBA1C 6 5 07/15/2019    Good control with fingerstick hemoglobin A1c at 6 5  Continue present treatment  No results for input(s): POCGLU in the last 72 hours      Blood Sugar Average: Last 72 hrs:

## 2019-07-16 ENCOUNTER — TRANSCRIBE ORDERS (OUTPATIENT)
Dept: ADMINISTRATIVE | Facility: HOSPITAL | Age: 64
End: 2019-07-16

## 2019-07-16 DIAGNOSIS — R31.0 GROSS HEMATURIA: Primary | ICD-10-CM

## 2019-07-21 ENCOUNTER — HOSPITAL ENCOUNTER (OUTPATIENT)
Dept: CT IMAGING | Facility: HOSPITAL | Age: 64
Discharge: HOME | End: 2019-07-21
Attending: UROLOGY
Payer: COMMERCIAL

## 2019-07-21 ENCOUNTER — TRANSCRIBE ORDERS (OUTPATIENT)
Dept: ADMINISTRATIVE | Facility: HOSPITAL | Age: 64
End: 2019-07-21

## 2019-07-21 ENCOUNTER — HOSPITAL ENCOUNTER (OUTPATIENT)
Dept: RADIOLOGY | Facility: HOSPITAL | Age: 64
Discharge: HOME | End: 2019-07-21
Attending: UROLOGY
Payer: COMMERCIAL

## 2019-07-21 DIAGNOSIS — R31.0 GROSS HEMATURIA: ICD-10-CM

## 2019-07-21 DIAGNOSIS — R31.0 GROSS HEMATURIA: Primary | ICD-10-CM

## 2019-07-21 PROCEDURE — 74176 CT ABD & PELVIS W/O CONTRAST: CPT

## 2019-07-21 PROCEDURE — 74018 RADEX ABDOMEN 1 VIEW: CPT

## 2019-08-21 ENCOUNTER — ANESTHESIA EVENT (OUTPATIENT)
Dept: PERIOP | Facility: HOSPITAL | Age: 64
End: 2019-08-21
Payer: COMMERCIAL

## 2019-08-21 NOTE — ANESTHESIA PREPROCEDURE EVALUATION
Review of Systems/Medical History  Patient summary reviewed  Chart reviewed      Cardiovascular  Hyperlipidemia, Hypertension , No angina , No orthopnea,    Pulmonary  Negative pulmonary ROS   Comment: STOP BANG score: 5     GI/Hepatic  Negative GI/hepatic ROS          Kidney stones (left renal midpole 5 mm),        Endo/Other  Diabetes type 2 Oral agent,   Obesity    GYN       Hematology  Negative hematology ROS      Musculoskeletal    Arthritis (B/L TKR)     Neurology  Negative neurology ROS      Psychology           Physical Exam    Airway    Mallampati score: II  TM Distance: >3 FB  Neck ROM: full     Dental       Cardiovascular  Cardiovascular exam normal    Pulmonary  Pulmonary exam normal     Other Findings        Anesthesia Plan  ASA Score- 2     Anesthesia Type- general with ASA Monitors  Additional Monitors:   Airway Plan: LMA  Plan Factors-    Induction- intravenous  Postoperative Plan-     Informed Consent- Anesthetic plan and risks discussed with patient  I personally reviewed this patient with the CRNA  Discussed and agreed on the Anesthesia Plan with the CRNA  Sunil Lofton

## 2019-08-21 NOTE — PRE-PROCEDURE INSTRUCTIONS
Pre-Surgery Instructions:   Medication Instructions    amLODIPine (NORVASC) 5 mg tablet Instructed patient per Anesthesia Guidelines   lisinopril-hydrochlorothiazide (PRINZIDE,ZESTORETIC) 20-25 MG per tablet Instructed patient per Anesthesia Guidelines   pravastatin (PRAVACHOL) 80 mg tablet Instructed patient per Anesthesia Guidelines

## 2019-08-22 ENCOUNTER — ANESTHESIA (OUTPATIENT)
Dept: PERIOP | Facility: HOSPITAL | Age: 64
End: 2019-08-22
Payer: COMMERCIAL

## 2019-08-22 ENCOUNTER — HOSPITAL ENCOUNTER (OUTPATIENT)
Facility: HOSPITAL | Age: 64
Setting detail: OUTPATIENT SURGERY
Discharge: HOME | End: 2019-08-22
Attending: UROLOGY | Admitting: UROLOGY
Payer: COMMERCIAL

## 2019-08-22 ENCOUNTER — APPOINTMENT (OUTPATIENT)
Dept: RADIOLOGY | Facility: HOSPITAL | Age: 64
End: 2019-08-22
Payer: COMMERCIAL

## 2019-08-22 VITALS
WEIGHT: 230 LBS | HEART RATE: 62 BPM | TEMPERATURE: 97.1 F | SYSTOLIC BLOOD PRESSURE: 138 MMHG | OXYGEN SATURATION: 94 % | RESPIRATION RATE: 16 BRPM | DIASTOLIC BLOOD PRESSURE: 83 MMHG | HEIGHT: 67 IN | BODY MASS INDEX: 36.1 KG/M2

## 2019-08-22 DIAGNOSIS — E11.9 DM TYPE 2, NOT AT GOAL (HCC): Primary | ICD-10-CM

## 2019-08-22 LAB — GLUCOSE SERPL-MCNC: 182 MG/DL (ref 65–140)

## 2019-08-22 PROCEDURE — 82948 REAGENT STRIP/BLOOD GLUCOSE: CPT

## 2019-08-22 PROCEDURE — 74018 RADEX ABDOMEN 1 VIEW: CPT

## 2019-08-22 RX ORDER — MIDAZOLAM HYDROCHLORIDE 1 MG/ML
INJECTION INTRAMUSCULAR; INTRAVENOUS AS NEEDED
Status: DISCONTINUED | OUTPATIENT
Start: 2019-08-22 | End: 2019-08-22 | Stop reason: SURG

## 2019-08-22 RX ORDER — DIPHENHYDRAMINE HYDROCHLORIDE 50 MG/ML
12.5 INJECTION INTRAMUSCULAR; INTRAVENOUS ONCE AS NEEDED
Status: DISCONTINUED | OUTPATIENT
Start: 2019-08-22 | End: 2019-08-22 | Stop reason: HOSPADM

## 2019-08-22 RX ORDER — ONDANSETRON 2 MG/ML
4 INJECTION INTRAMUSCULAR; INTRAVENOUS EVERY 6 HOURS PRN
Status: DISCONTINUED | OUTPATIENT
Start: 2019-08-22 | End: 2019-08-22 | Stop reason: HOSPADM

## 2019-08-22 RX ORDER — SODIUM CHLORIDE 9 MG/ML
125 INJECTION, SOLUTION INTRAVENOUS CONTINUOUS
Status: DISCONTINUED | OUTPATIENT
Start: 2019-08-22 | End: 2019-08-22 | Stop reason: HOSPADM

## 2019-08-22 RX ORDER — FENTANYL CITRATE/PF 50 MCG/ML
25 SYRINGE (ML) INJECTION
Status: DISCONTINUED | OUTPATIENT
Start: 2019-08-22 | End: 2019-08-22 | Stop reason: HOSPADM

## 2019-08-22 RX ORDER — HYDROMORPHONE HCL/PF 1 MG/ML
0.5 SYRINGE (ML) INJECTION
Status: DISCONTINUED | OUTPATIENT
Start: 2019-08-22 | End: 2019-08-22 | Stop reason: HOSPADM

## 2019-08-22 RX ORDER — ACETAMINOPHEN 325 MG/1
650 TABLET ORAL EVERY 6 HOURS SCHEDULED
Status: DISCONTINUED | OUTPATIENT
Start: 2019-08-22 | End: 2019-08-22 | Stop reason: HOSPADM

## 2019-08-22 RX ORDER — PROPOFOL 10 MG/ML
INJECTION, EMULSION INTRAVENOUS AS NEEDED
Status: DISCONTINUED | OUTPATIENT
Start: 2019-08-22 | End: 2019-08-22 | Stop reason: SURG

## 2019-08-22 RX ORDER — CIPROFLOXACIN 2 MG/ML
400 INJECTION, SOLUTION INTRAVENOUS ONCE
Status: DISCONTINUED | OUTPATIENT
Start: 2019-08-22 | End: 2019-08-22 | Stop reason: HOSPADM

## 2019-08-22 RX ORDER — OXYCODONE HYDROCHLORIDE AND ACETAMINOPHEN 5; 325 MG/1; MG/1
1 TABLET ORAL EVERY 4 HOURS PRN
Status: DISCONTINUED | OUTPATIENT
Start: 2019-08-22 | End: 2019-08-22 | Stop reason: HOSPADM

## 2019-08-22 RX ORDER — TAMSULOSIN HYDROCHLORIDE 0.4 MG/1
0.4 CAPSULE ORAL ONCE
Status: COMPLETED | OUTPATIENT
Start: 2019-08-22 | End: 2019-08-22

## 2019-08-22 RX ORDER — ACETAMINOPHEN 325 MG/1
650 TABLET ORAL EVERY 6 HOURS PRN
Status: DISCONTINUED | OUTPATIENT
Start: 2019-08-22 | End: 2019-08-22 | Stop reason: HOSPADM

## 2019-08-22 RX ORDER — LIDOCAINE HYDROCHLORIDE 10 MG/ML
INJECTION, SOLUTION INFILTRATION; PERINEURAL AS NEEDED
Status: DISCONTINUED | OUTPATIENT
Start: 2019-08-22 | End: 2019-08-22 | Stop reason: SURG

## 2019-08-22 RX ORDER — SODIUM CHLORIDE, SODIUM LACTATE, POTASSIUM CHLORIDE, CALCIUM CHLORIDE 600; 310; 30; 20 MG/100ML; MG/100ML; MG/100ML; MG/100ML
125 INJECTION, SOLUTION INTRAVENOUS CONTINUOUS
Status: DISCONTINUED | OUTPATIENT
Start: 2019-08-22 | End: 2019-08-22 | Stop reason: HOSPADM

## 2019-08-22 RX ORDER — FENTANYL CITRATE 50 UG/ML
INJECTION, SOLUTION INTRAMUSCULAR; INTRAVENOUS AS NEEDED
Status: DISCONTINUED | OUTPATIENT
Start: 2019-08-22 | End: 2019-08-22 | Stop reason: SURG

## 2019-08-22 RX ADMIN — SODIUM CHLORIDE, SODIUM LACTATE, POTASSIUM CHLORIDE, AND CALCIUM CHLORIDE: .6; .31; .03; .02 INJECTION, SOLUTION INTRAVENOUS at 11:56

## 2019-08-22 RX ADMIN — FENTANYL CITRATE 100 MCG: 50 INJECTION INTRAMUSCULAR; INTRAVENOUS at 12:09

## 2019-08-22 RX ADMIN — CIPROFLOXACIN 400 MG: 2 INJECTION INTRAVENOUS at 12:08

## 2019-08-22 RX ADMIN — LIDOCAINE HYDROCHLORIDE 50 MG: 10 INJECTION, SOLUTION INFILTRATION; PERINEURAL at 12:09

## 2019-08-22 RX ADMIN — TAMSULOSIN HYDROCHLORIDE 0.4 MG: 0.4 CAPSULE ORAL at 13:17

## 2019-08-22 RX ADMIN — PROPOFOL 200 MG: 10 INJECTION, EMULSION INTRAVENOUS at 12:09

## 2019-08-22 RX ADMIN — MIDAZOLAM HYDROCHLORIDE 2 MG: 1 INJECTION, SOLUTION INTRAMUSCULAR; INTRAVENOUS at 12:02

## 2019-08-22 NOTE — DISCHARGE INSTRUCTIONS
How to Strain Your Urine   WHAT YOU NEED TO KNOW:   Urinate into a strainer (funnel with a fine mesh on the bottom) or glass jar to collect kidney stones  DISCHARGE INSTRUCTIONS:   Medicines:   · Pain medicine: You may be given medicine to take away or decrease pain  Do not wait until the pain is severe before you take your medicine  · NSAIDs:  These medicines decrease swelling, pain, and fever  NSAIDs are available without a doctor's order  Ask which medicine is right for you  Ask how much to take and when to take it  Take as directed  NSAIDs can cause stomach bleeding and kidney problems if not taken correctly  · Nausea medicine: This medicine calms your stomach and prevents or controls vomiting  · Take your medicine as directed  Contact your healthcare provider if you think your medicine is not helping or if you have side effects  Tell him of her if you are allergic to any medicine  Keep a list of the medicines, vitamins, and herbs you take  Include the amounts, and when and why you take them  Bring the list or the pill bottles to follow-up visits  Carry your medicine list with you in case of an emergency  Drink liquids as directed:  Drink about 3 liters of liquids each day, or as directed  That equals about 12 glasses of water or fruit juice  Half of your total daily liquids should be water  Limit coffee, tea, and soda to 2 cups daily  Your urine will be pale and clear if you are drinking enough liquid  Self-care:   · Activity:  Exercise, such as walking, may help decrease your pain  · Avoid heat:  Heat may cause you to sweat, urinate less, and become dehydrated  Follow up with your healthcare provider or urologist as directed:  Write down your questions so you remember to ask them during your visits  Contact your healthcare provider or urologist if:   · You have a fever and chills  · Your urine looks cloudy or has a bad smell  · You have burning pain when you urinate  · You have trouble urinating  · You are vomiting and it does not get better, even after you take medicine  · You have questions or concerns about your condition or care  Return to the emergency department if:   · You are not able to urinate  · You have severe pain in your lower abdomen or side  · Your heart flutters or beats faster than usual   © 2017 [employer] Information is for End User's use only and may not be sold, redistributed or otherwise used for commercial purposes  All illustrations and images included in CareNotes® are the copyrighted property of [employer]  or [employer]ose[employer]  The above information is an  only  It is not intended as medical advice for individual conditions or treatments  Talk to your doctor, nurse or pharmacist before following any medical regimen to see if it is safe and effective for you  Lithotripsy   WHAT YOU NEED TO KNOW:   Lithotripsy is a procedure that uses sound waves to break up stones in the kidney, ureter, or bladder  The stone pieces then pass out of your body through your urine  You may have blood in your urine for 1 to 2 days after the procedure  You may also have bruising and discomfort in your back or abdomen  You may have pain whenever you pass pieces of your kidney stone  This may happen over a few weeks  DISCHARGE INSTRUCTIONS:   Call 911 for any of the following:   · You have chest pain  · You have trouble thinking clearly  · You have severe lower back pain  Seek care immediately if:   · You urinate bright red blood  · You have severe vomiting  · You cannot urinate  Contact your healthcare provider if:   · You have a fever and chills  · You feel burning when you urinate  · You feel the urge to urinate often and immediately  · You have questions or concerns about your condition or care  Medicines:   · Medicines  can help decrease pain or prevent an infection   Medicines may also help you pass the stones or prevent more stones from forming  · Take your medicine as directed  Contact your healthcare provider if you think your medicine is not helping or if you have side effects  Tell him or her if you are allergic to any medicine  Keep a list of the medicines, vitamins, and herbs you take  Include the amounts, and when and why you take them  Bring the list or the pill bottles to follow-up visits  Carry your medicine list with you in case of an emergency  Self-care:   · Strain your urine every time you go to the bathroom  Urinate through a strainer or a piece of thin cloth to catch the stones  Take the pieces to your follow-up visits  · If you have a stent, do not pull on it  This can cause pain and bleeding  · Apply heat  on your lower back for 20 to 30 minutes every 2 hours for as many days as directed  Heat helps decrease pain and muscle spasms  · Drink liquids as directed  You may need to drink more liquid than usual  This will help flush any remaining small pieces of stone  Liquids can also help prevent more stones from forming  Ask how much liquid to drink each day and which liquids are best for you  Do not drink caffeine  · Rest  when you feel it is needed  Slowly start to do more each day  · Eat a variety of healthy foods  Ask if you need to make changes to the foods you eat  Healthy foods include fruits, vegetables, whole-grain breads, low-fat dairy products, beans, and fish  You may need to limit nuts, chocolate, coffee, and certain green leafy vegetables  You may also need to limit meat and salt  Follow up with your healthcare provider as directed: You may need to return to have your stent removed  Write down your questions so you remember to ask them during your visits  © 2017 [employer] Information is for End User's use only and may not be sold, redistributed or otherwise used for commercial purposes   All illustrations and images included in CareNotes® are the copyrighted property of [employer]  or [employer] Posey  The above information is an  only  It is not intended as medical advice for individual conditions or treatments  Talk to your doctor, nurse or pharmacist before following any medical regimen to see if it is safe and effective for you  Lithotripsy   WHAT YOU NEED TO KNOW:   What do I need to know about lithotripsy? Lithotripsy is a procedure that uses sound waves to break up stones in the kidney, ureter, or bladder  The stone pieces then pass out of your body through your urine  How do I prepare for a lithotripsy? Your healthcare provider will talk to you about how to prepare for the procedure  He may tell you not to eat or drink anything after midnight on the day of your surgery  He will tell you what medicines to take or not take on the day of your procedure  You may need to stop taking any medicines that thin your blood 1 week or more before your procedure  These medicines include aspirin, ibuprofen, and anticoagulants  What will happen during lithotripsy? You may be given medicine to keep you relaxed and free from pain during the procedure  You may instead be given general anesthesia to keep you asleep and free from pain during the procedure  X-rays or an ultrasound are used to find the kidney stone  You may lie on a cushion filled with water or sit in a bath of warm water  High-energy sound waves are aimed at your kidney stone  The sound waves break the stone into tiny pieces  You will pass these pieces after a few days when you urinate  A stent (tube) may be put into your kidney or ureter through your bladder or back  The stent helps the pieces of stone pass out of your body  What will happen after a lithotripsy? You may have blood in your urine for 1 to 2 days  You may also have bruising and discomfort in your back or abdomen   You may have pain whenever you pass pieces of your kidney stone  This may happen over a few weeks  What are the risks of a lithotripsy? You may develop bleeding around your kidney or get a kidney infection  The pieces of stone may block the flow of urine from your kidney  You may need another lithotripsy, or other procedure, if pieces of stone are left in your body  You may develop a stomach or intestine ulcer  Your kidney may not work correctly after the procedure  Your kidney may stop working completely  This can be life-threatening  CARE AGREEMENT:   You have the right to help plan your care  Learn about your health condition and how it may be treated  Discuss treatment options with your caregivers to decide what care you want to receive  You always have the right to refuse treatment  The above information is an  only  It is not intended as medical advice for individual conditions or treatments  Talk to your doctor, nurse or pharmacist before following any medical regimen to see if it is safe and effective for you  © 2017 [employer]  Information is for End User's use only and may not be sold, redistributed or otherwise used for commercial purposes  All illustrations and images included in CareNotes® are the copyrighted property of [employer]  or [employer]ik Posey

## 2019-08-22 NOTE — PERIOPERATIVE NURSING NOTE
Pt voided 200ml dark red urine, urine strained, no stones noted  Pt discharged via wheelchair, VSS, AAOx4  Urine strainer and specimen cup provided  All discharge instructions reviewed with pt and spouse, including straining urine and keeping FU appt  Pt understands to monitor for s/s of infection and to go to ER with any problems voiding  Pt verbalized understanding of all discharge instructions and all questions were answered

## 2019-08-22 NOTE — INTERVAL H&P NOTE
H&P reviewed  After examining the patient I find no changes in the patients condition since the H&P had been written      Vitals:    08/22/19 0934   BP: 162/98   Pulse: 71   Resp: 20   Temp: 97 8 °F (36 6 °C)   SpO2: 94%

## 2019-08-22 NOTE — ANESTHESIA POSTPROCEDURE EVALUATION
Post-Op Assessment Note    CV Status:  Stable  Pain Score: 0    Pain management: adequate     Mental Status:  Alert and awake   Hydration Status:  Euvolemic   PONV Controlled:  Controlled   Airway Patency:  Patent   Post Op Vitals Reviewed: Yes      Staff: CRNA           /91 (08/22/19 1250)    Temp (!) 97 °F (36 1 °C) (08/22/19 1250)    Pulse 78 (08/22/19 1250)   Resp 20 (08/22/19 1250)    SpO2 97 % (08/22/19 1250)

## 2019-08-22 NOTE — OP NOTE
OPERATIVE REPORT    PATIENT NAME: Jenny Dodge    :  1955  MRN: [id number]  Pt Location:  OR ROOM 10    SURGERY DATE:   2019  Surgeon(s) and Role:      Mick Seip, DO - Primary    Preop Diagnosis:  Calculus of kidney [N20 0]  Post-Op Diagnosis Codes:   Calculus of kidney [N20 0] left side, mid pole  Procedure(s):  ESWL OF RENAL CALCULUS, left side, mid pole    Specimen(s):  None    Estimated Blood Loss:   Minimal    Drains:  * No LDAs found *    Anesthesia Type:   General    Operative Indications:  5 mm left renal calculus  With pain on the contralateral side    Operative Findings:  5 mm left mid pole renal calculus    Complications:   None known    Procedure and Technique:  Patient was identified  General anesthesia was administered  Time-out was taken  Stone was fluoroscopically localized  Lithotripsy was commenced  One hundred twenty-five shock waves were delivered at energy levels 1 through 4 for total of 500 shock waves  One thousand shock waves were delivered energy level 5  One thousand shock waves were delivered at energy level 6  Five hundred shock waves were delivered energy level 7  A total of 3000 shock waves were delivered  Stone appeared to fragment intraoperatively  Follow-up KUB film will be obtained to determine the efficacy of today's treatment which was otherwise well tolerated  Patient is to strain his urine and save any stones for analysis  He is to follow up at the office on an outpatient basis  Procedure was otherwise well tolerated patient went to recovery room in good postoperative condition       I was present for the entire procedure    Patient Disposition:  PACU     SIGNATURE: Mick Seip, DO  DATE: 2019  TIME: 12:41 PM

## 2019-08-22 NOTE — PERIOPERATIVE NURSING NOTE
Pt received back to  448 bed 1 via stretcher  Pt AAOx4  VSS  Pt reports 2/10 pain (operative site)  Pink noted to left mid back  No edema or drainage noted  IV fluids infusing  Toast and PO fluids provided  MD in to speak with pt  Script for KUB provided  Strainers for urine and container placed at bedside  No complaints offered  Call bell within reach

## 2019-08-27 ENCOUNTER — TRANSCRIBE ORDERS (OUTPATIENT)
Dept: ADMINISTRATIVE | Facility: HOSPITAL | Age: 64
End: 2019-08-27

## 2019-08-27 ENCOUNTER — HOSPITAL ENCOUNTER (OUTPATIENT)
Dept: RADIOLOGY | Facility: HOSPITAL | Age: 64
Discharge: HOME | End: 2019-08-27
Attending: UROLOGY
Payer: COMMERCIAL

## 2019-08-27 DIAGNOSIS — N20.0 KIDNEY STONE ON LEFT SIDE: Primary | ICD-10-CM

## 2019-08-27 DIAGNOSIS — N20.0 KIDNEY STONE ON LEFT SIDE: ICD-10-CM

## 2019-08-27 PROCEDURE — 74018 RADEX ABDOMEN 1 VIEW: CPT

## 2019-12-31 ENCOUNTER — OFFICE VISIT (OUTPATIENT)
Dept: FAMILY MEDICINE CLINIC | Facility: CLINIC | Age: 64
End: 2019-12-31
Payer: COMMERCIAL

## 2019-12-31 VITALS
SYSTOLIC BLOOD PRESSURE: 124 MMHG | HEIGHT: 67 IN | DIASTOLIC BLOOD PRESSURE: 76 MMHG | OXYGEN SATURATION: 98 % | HEART RATE: 110 BPM | WEIGHT: 236 LBS | BODY MASS INDEX: 37.04 KG/M2 | TEMPERATURE: 98 F

## 2019-12-31 DIAGNOSIS — J40 BRONCHITIS: Primary | ICD-10-CM

## 2019-12-31 PROCEDURE — 99213 OFFICE O/P EST LOW 20 MIN: CPT | Performed by: FAMILY MEDICINE

## 2019-12-31 PROCEDURE — 1036F TOBACCO NON-USER: CPT | Performed by: FAMILY MEDICINE

## 2019-12-31 PROCEDURE — 3008F BODY MASS INDEX DOCD: CPT | Performed by: FAMILY MEDICINE

## 2019-12-31 RX ORDER — BENZONATATE 200 MG/1
200 CAPSULE ORAL 3 TIMES DAILY PRN
Qty: 20 CAPSULE | Refills: 0 | Status: SHIPPED | OUTPATIENT
Start: 2019-12-31 | End: 2020-01-08

## 2019-12-31 RX ORDER — AZITHROMYCIN 250 MG/1
TABLET, FILM COATED ORAL
Qty: 6 TABLET | Refills: 0 | Status: SHIPPED | OUTPATIENT
Start: 2019-12-31 | End: 2020-01-08

## 2019-12-31 NOTE — PROGRESS NOTES
Assessment/Plan:  Side effect profile medication reviewed  Recommend return to office if no improvement or worsening symptoms  No problem-specific Assessment & Plan notes found for this encounter  Diagnoses and all orders for this visit:    Bronchitis  -     azithromycin (ZITHROMAX) 250 mg tablet; Take 2 tablets today then 1 tablet daily x 4 days  -     benzonatate (TESSALON) 200 MG capsule; Take 1 capsule (200 mg total) by mouth 3 (three) times a day as needed for cough          Subjective:      Patient ID: Karen Ballard is a 62 y o  male  Patient with cough and congestion over the last 1 week  Symptoms are mild-to-moderate  No GI complaints  No fevers  He is currently taking over-the-counter medication without much relief  The following portions of the patient's history were reviewed and updated as appropriate: allergies, current medications, past family history, past medical history, past social history, past surgical history and problem list     Review of Systems   Constitutional: Negative for fever  HENT: Positive for congestion and sinus pressure  Respiratory: Positive for cough  Objective:      /76 (BP Location: Left arm, Patient Position: Sitting, Cuff Size: Standard)   Pulse (!) 110   Temp 98 °F (36 7 °C) (Tympanic)   Ht 5' 6 93" (1 7 m)   Wt 107 kg (236 lb)   SpO2 98%   BMI 37 04 kg/m²          Physical Exam   Constitutional: He is oriented to person, place, and time  He appears well-developed and well-nourished  HENT:   Head: Normocephalic and atraumatic  Right Ear: External ear normal  Tympanic membrane is not erythematous and not bulging  Left Ear: External ear normal  Tympanic membrane is not erythematous and not bulging  Nose: Nose normal    Mouth/Throat: Oropharynx is clear and moist and mucous membranes are normal  No oral lesions  No oropharyngeal exudate  Eyes: Conjunctivae and EOM are normal  Right eye exhibits no discharge   Left eye exhibits no discharge  No scleral icterus  Neck: Normal range of motion  Neck supple  No thyromegaly present  Cardiovascular: Normal rate, regular rhythm and normal heart sounds  Exam reveals no gallop and no friction rub  No murmur heard  Pulmonary/Chest: Effort normal  No respiratory distress  He has no wheezes  He has no rales  He exhibits no tenderness  Abdominal: Soft  Bowel sounds are normal  He exhibits no distension and no mass  There is no tenderness  There is no rebound and no guarding  Musculoskeletal: Normal range of motion  He exhibits no edema, tenderness or deformity  Lymphadenopathy:     He has no cervical adenopathy  Neurological: He is alert and oriented to person, place, and time  He has normal reflexes  No cranial nerve deficit  He exhibits normal muscle tone  Coordination normal    Skin: Skin is warm and dry  No rash noted  No erythema  No pallor  Psychiatric: He has a normal mood and affect  His behavior is normal    Vitals reviewed

## 2019-12-31 NOTE — PROGRESS NOTES
BMI Counseling: Body mass index is 37 04 kg/m²  The BMI is above normal  Nutrition recommendations include reducing portion sizes

## 2020-01-02 ENCOUNTER — TELEPHONE (OUTPATIENT)
Dept: FAMILY MEDICINE CLINIC | Facility: CLINIC | Age: 65
End: 2020-01-02

## 2020-01-02 NOTE — TELEPHONE ENCOUNTER
Pt was seen on 12/31/19 and states he is still not feeling better  He would like to know if he can get a work note excusing him for today and tomorrow?

## 2020-01-02 NOTE — TELEPHONE ENCOUNTER
Pt called back regarding this  He states he saw Dr Olga Jones when he was in on 12/31/19  Please advise regarding the note  Thank you

## 2020-01-07 ENCOUNTER — TELEPHONE (OUTPATIENT)
Dept: FAMILY MEDICINE CLINIC | Facility: CLINIC | Age: 65
End: 2020-01-07

## 2020-01-07 NOTE — TELEPHONE ENCOUNTER
Patient was in for an appointment on 12/31/19  Patient states he is still not feeling better  Please advise

## 2020-01-08 ENCOUNTER — OFFICE VISIT (OUTPATIENT)
Dept: FAMILY MEDICINE CLINIC | Facility: CLINIC | Age: 65
End: 2020-01-08
Payer: COMMERCIAL

## 2020-01-08 VITALS
DIASTOLIC BLOOD PRESSURE: 82 MMHG | SYSTOLIC BLOOD PRESSURE: 132 MMHG | WEIGHT: 232 LBS | TEMPERATURE: 98 F | BODY MASS INDEX: 36.41 KG/M2

## 2020-01-08 DIAGNOSIS — J40 BRONCHITIS: Primary | ICD-10-CM

## 2020-01-08 PROCEDURE — 99213 OFFICE O/P EST LOW 20 MIN: CPT | Performed by: NURSE PRACTITIONER

## 2020-01-08 PROCEDURE — 3079F DIAST BP 80-89 MM HG: CPT | Performed by: NURSE PRACTITIONER

## 2020-01-08 PROCEDURE — 1036F TOBACCO NON-USER: CPT | Performed by: NURSE PRACTITIONER

## 2020-01-08 PROCEDURE — 3075F SYST BP GE 130 - 139MM HG: CPT | Performed by: NURSE PRACTITIONER

## 2020-01-08 RX ORDER — PREDNISONE 20 MG/1
TABLET ORAL
Qty: 12 TABLET | Refills: 0 | Status: SHIPPED | OUTPATIENT
Start: 2020-01-08 | End: 2020-10-06

## 2020-01-08 NOTE — PROGRESS NOTES
Assessment/Plan:    No problem-specific Assessment & Plan notes found for this encounter  Diagnoses and all orders for this visit:    Bronchitis  -     predniSONE 20 mg tablet; Take 3 tab today; then 2 tab for 3 days; then one tab x 3 days  Hot, steamy beverages like lemon, honey, water  Explained cough might linger for a couple of weeks but to follow up if worsening or having any trouble breathing  Subjective:      Patient ID: Ruth Nguyen is a 62 y o  male  Pt was seen for URI on 12/31/20 and rxd zpk  Is not feeling better  Still has congested cough  No fever or SOB  Pt says his wife commented the other day on his wheezing  The following portions of the patient's history were reviewed and updated as appropriate: allergies, current medications, past family history, past medical history, past social history, past surgical history and problem list     Review of Systems   Constitutional: Positive for fatigue  Negative for activity change, appetite change and fever  HENT: Positive for congestion  Negative for postnasal drip, rhinorrhea, sinus pressure, sinus pain, sneezing and sore throat  Respiratory: Positive for wheezing  Negative for chest tightness and shortness of breath  Cough: worse when lying flat  Gastrointestinal: Positive for diarrhea (last wk) and nausea (last wk)  Genitourinary: Negative for difficulty urinating  Neurological: Negative for dizziness and weakness  Hematological: Negative for adenopathy  Objective:      /82   Temp 98 °F (36 7 °C)   Wt 105 kg (232 lb)   BMI 36 41 kg/m²          Physical Exam   Constitutional: He is oriented to person, place, and time  He appears well-developed and well-nourished  HENT:   Head: Normocephalic  Right Ear: External ear normal    Left Ear: External ear normal    Nose: Nose normal    Mouth/Throat: Oropharynx is clear and moist  No oropharyngeal exudate     Eyes: Conjunctivae are normal    Neck: Normal range of motion  Neck supple  Cardiovascular: Normal rate, regular rhythm and normal heart sounds  No murmur heard  Pulmonary/Chest: Effort normal and breath sounds normal  No stridor  No respiratory distress  He has no wheezes  He has no rales  Freq congested cough in office  Musculoskeletal: Normal range of motion  Lymphadenopathy:     He has no cervical adenopathy  Neurological: He is alert and oriented to person, place, and time  Skin: Skin is warm and dry  Psychiatric: He has a normal mood and affect

## 2020-03-25 ENCOUNTER — TELEPHONE (OUTPATIENT)
Dept: FAMILY MEDICINE CLINIC | Facility: CLINIC | Age: 65
End: 2020-03-25

## 2020-03-25 NOTE — TELEPHONE ENCOUNTER
Spoke to pt to remind him that he is past due for his diabetic eye exam  He states that he will call to get this done

## 2020-03-30 DIAGNOSIS — I10 ESSENTIAL HYPERTENSION, BENIGN: ICD-10-CM

## 2020-03-30 DIAGNOSIS — E78.5 HYPERLIPIDEMIA, UNSPECIFIED HYPERLIPIDEMIA TYPE: ICD-10-CM

## 2020-03-30 DIAGNOSIS — E11.9 TYPE 2 DIABETES MELLITUS WITHOUT COMPLICATION, WITHOUT LONG-TERM CURRENT USE OF INSULIN (HCC): ICD-10-CM

## 2020-03-30 DIAGNOSIS — M15.9 PRIMARY OSTEOARTHRITIS INVOLVING MULTIPLE JOINTS: ICD-10-CM

## 2020-03-30 RX ORDER — PRAVASTATIN SODIUM 80 MG/1
TABLET ORAL
Qty: 90 TABLET | Refills: 3 | Status: SHIPPED | OUTPATIENT
Start: 2020-03-30 | End: 2021-05-21 | Stop reason: SDUPTHER

## 2020-03-30 RX ORDER — LISINOPRIL AND HYDROCHLOROTHIAZIDE 25; 20 MG/1; MG/1
1 TABLET ORAL DAILY
Qty: 90 TABLET | Refills: 3 | Status: SHIPPED | OUTPATIENT
Start: 2020-03-30 | End: 2021-05-21 | Stop reason: SDUPTHER

## 2020-03-30 RX ORDER — AMLODIPINE BESYLATE 5 MG/1
TABLET ORAL
Qty: 90 TABLET | Refills: 3 | Status: SHIPPED | OUTPATIENT
Start: 2020-03-30 | End: 2021-05-21 | Stop reason: SDUPTHER

## 2020-03-30 RX ORDER — MELOXICAM 15 MG/1
TABLET ORAL
Qty: 90 TABLET | Refills: 3 | Status: SHIPPED | OUTPATIENT
Start: 2020-03-30 | End: 2021-05-21 | Stop reason: SDUPTHER

## 2020-03-30 RX ORDER — GLIMEPIRIDE 2 MG/1
TABLET ORAL
Qty: 90 TABLET | Refills: 3 | Status: SHIPPED | OUTPATIENT
Start: 2020-03-30 | End: 2021-05-21 | Stop reason: SDUPTHER

## 2020-09-11 LAB
LEFT EYE DIABETIC RETINOPATHY: NORMAL
RIGHT EYE DIABETIC RETINOPATHY: NORMAL

## 2020-10-06 ENCOUNTER — OFFICE VISIT (OUTPATIENT)
Dept: FAMILY MEDICINE CLINIC | Facility: CLINIC | Age: 65
End: 2020-10-06
Payer: COMMERCIAL

## 2020-10-06 VITALS
OXYGEN SATURATION: 98 % | TEMPERATURE: 97.8 F | HEART RATE: 72 BPM | HEIGHT: 67 IN | RESPIRATION RATE: 16 BRPM | WEIGHT: 223 LBS | SYSTOLIC BLOOD PRESSURE: 144 MMHG | DIASTOLIC BLOOD PRESSURE: 82 MMHG | BODY MASS INDEX: 35 KG/M2

## 2020-10-06 DIAGNOSIS — I10 ESSENTIAL HYPERTENSION: ICD-10-CM

## 2020-10-06 DIAGNOSIS — E11.9 DM TYPE 2, NOT AT GOAL (HCC): Primary | ICD-10-CM

## 2020-10-06 DIAGNOSIS — E78.5 HYPERLIPIDEMIA, UNSPECIFIED HYPERLIPIDEMIA TYPE: ICD-10-CM

## 2020-10-06 DIAGNOSIS — K42.9 UMBILICAL HERNIA WITHOUT OBSTRUCTION AND WITHOUT GANGRENE: ICD-10-CM

## 2020-10-06 DIAGNOSIS — M15.9 PRIMARY OSTEOARTHRITIS INVOLVING MULTIPLE JOINTS: ICD-10-CM

## 2020-10-06 DIAGNOSIS — N40.0 ENLARGED PROSTATE WITHOUT LOWER URINARY TRACT SYMPTOMS (LUTS): ICD-10-CM

## 2020-10-06 DIAGNOSIS — E66.09 CLASS 1 OBESITY DUE TO EXCESS CALORIES WITH SERIOUS COMORBIDITY AND BODY MASS INDEX (BMI) OF 34.0 TO 34.9 IN ADULT: ICD-10-CM

## 2020-10-06 DIAGNOSIS — E55.9 VITAMIN D DEFICIENCY: ICD-10-CM

## 2020-10-06 LAB — SL AMB POCT HEMOGLOBIN AIC: 9.2 (ref ?–6.5)

## 2020-10-06 PROCEDURE — 99214 OFFICE O/P EST MOD 30 MIN: CPT | Performed by: FAMILY MEDICINE

## 2020-10-06 PROCEDURE — 3077F SYST BP >= 140 MM HG: CPT | Performed by: FAMILY MEDICINE

## 2020-10-06 PROCEDURE — 83036 HEMOGLOBIN GLYCOSYLATED A1C: CPT | Performed by: FAMILY MEDICINE

## 2020-10-06 PROCEDURE — 3079F DIAST BP 80-89 MM HG: CPT | Performed by: FAMILY MEDICINE

## 2020-10-06 PROCEDURE — 3725F SCREEN DEPRESSION PERFORMED: CPT | Performed by: FAMILY MEDICINE

## 2020-10-06 PROCEDURE — 1036F TOBACCO NON-USER: CPT | Performed by: FAMILY MEDICINE

## 2020-10-06 PROCEDURE — 3046F HEMOGLOBIN A1C LEVEL >9.0%: CPT | Performed by: FAMILY MEDICINE

## 2020-10-07 LAB
25(OH)D3 SERPL-MCNC: 37 NG/ML (ref 30–100)
ALBUMIN SERPL-MCNC: 4.7 G/DL (ref 3.6–5.1)
ALBUMIN/GLOB SERPL: 1.7 (CALC) (ref 1–2.5)
ALP SERPL-CCNC: 66 U/L (ref 35–144)
ALT SERPL-CCNC: 32 U/L (ref 9–46)
AST SERPL-CCNC: 17 U/L (ref 10–35)
BILIRUB SERPL-MCNC: 0.5 MG/DL (ref 0.2–1.2)
BUN SERPL-MCNC: 23 MG/DL (ref 7–25)
BUN/CREAT SERPL: ABNORMAL (CALC) (ref 6–22)
CALCIUM SERPL-MCNC: 10 MG/DL (ref 8.6–10.3)
CHLORIDE SERPL-SCNC: 101 MMOL/L (ref 98–110)
CHOLEST SERPL-MCNC: 172 MG/DL
CHOLEST/HDLC SERPL: 3.3 (CALC)
CO2 SERPL-SCNC: 25 MMOL/L (ref 20–32)
CREAT SERPL-MCNC: 0.9 MG/DL (ref 0.7–1.25)
ERYTHROCYTE [DISTWIDTH] IN BLOOD BY AUTOMATED COUNT: 12.7 % (ref 11–15)
GLOBULIN SER CALC-MCNC: 2.8 G/DL (CALC) (ref 1.9–3.7)
GLUCOSE SERPL-MCNC: 148 MG/DL (ref 65–99)
HCT VFR BLD AUTO: 46.5 % (ref 38.5–50)
HDLC SERPL-MCNC: 52 MG/DL
HGB BLD-MCNC: 15.4 G/DL (ref 13.2–17.1)
LDLC SERPL CALC-MCNC: 97 MG/DL (CALC)
MCH RBC QN AUTO: 28.9 PG (ref 27–33)
MCHC RBC AUTO-ENTMCNC: 33.1 G/DL (ref 32–36)
MCV RBC AUTO: 87.2 FL (ref 80–100)
NONHDLC SERPL-MCNC: 120 MG/DL (CALC)
PLATELET # BLD AUTO: 303 THOUSAND/UL (ref 140–400)
PMV BLD REES-ECKER: 11.4 FL (ref 7.5–12.5)
POTASSIUM SERPL-SCNC: 4.5 MMOL/L (ref 3.5–5.3)
PROT SERPL-MCNC: 7.5 G/DL (ref 6.1–8.1)
RBC # BLD AUTO: 5.33 MILLION/UL (ref 4.2–5.8)
SL AMB EGFR AFRICAN AMERICAN: 104 ML/MIN/1.73M2
SL AMB EGFR NON AFRICAN AMERICAN: 90 ML/MIN/1.73M2
SODIUM SERPL-SCNC: 138 MMOL/L (ref 135–146)
SPECIMEN SOURCE: NORMAL
TRIGL SERPL-MCNC: 129 MG/DL
TSH SERPL-ACNC: 1.23 MIU/L (ref 0.4–4.5)
WBC # BLD AUTO: 8.7 THOUSAND/UL (ref 3.8–10.8)

## 2020-10-27 ENCOUNTER — OFFICE VISIT (OUTPATIENT)
Dept: SURGERY | Facility: CLINIC | Age: 65
End: 2020-10-27
Payer: COMMERCIAL

## 2020-10-27 VITALS
SYSTOLIC BLOOD PRESSURE: 150 MMHG | TEMPERATURE: 96.5 F | HEART RATE: 76 BPM | WEIGHT: 216.8 LBS | DIASTOLIC BLOOD PRESSURE: 86 MMHG | BODY MASS INDEX: 34.03 KG/M2 | HEIGHT: 67 IN | RESPIRATION RATE: 16 BRPM

## 2020-10-27 DIAGNOSIS — E11.9 DM TYPE 2, NOT AT GOAL (HCC): Primary | ICD-10-CM

## 2020-10-27 DIAGNOSIS — E66.09 CLASS 1 OBESITY DUE TO EXCESS CALORIES WITH SERIOUS COMORBIDITY AND BODY MASS INDEX (BMI) OF 33.0 TO 33.9 IN ADULT: ICD-10-CM

## 2020-10-27 DIAGNOSIS — K42.9 UMBILICAL HERNIA WITHOUT OBSTRUCTION AND WITHOUT GANGRENE: ICD-10-CM

## 2020-10-27 PROCEDURE — 3079F DIAST BP 80-89 MM HG: CPT | Performed by: SURGERY

## 2020-10-27 PROCEDURE — 3008F BODY MASS INDEX DOCD: CPT | Performed by: SURGERY

## 2020-10-27 PROCEDURE — 99203 OFFICE O/P NEW LOW 30 MIN: CPT | Performed by: SURGERY

## 2020-10-27 PROCEDURE — 3077F SYST BP >= 140 MM HG: CPT | Performed by: SURGERY

## 2020-12-28 DIAGNOSIS — E11.9 TYPE 2 DIABETES MELLITUS WITHOUT COMPLICATION, WITHOUT LONG-TERM CURRENT USE OF INSULIN (HCC): ICD-10-CM

## 2020-12-28 RX ORDER — BLOOD-GLUCOSE METER
KIT MISCELLANEOUS
Qty: 100 EACH | Refills: 3 | Status: SHIPPED | OUTPATIENT
Start: 2020-12-28 | End: 2022-01-01

## 2021-05-21 ENCOUNTER — OFFICE VISIT (OUTPATIENT)
Dept: FAMILY MEDICINE CLINIC | Facility: CLINIC | Age: 66
End: 2021-05-21
Payer: COMMERCIAL

## 2021-05-21 VITALS
RESPIRATION RATE: 16 BRPM | BODY MASS INDEX: 33.84 KG/M2 | TEMPERATURE: 97.6 F | DIASTOLIC BLOOD PRESSURE: 88 MMHG | HEART RATE: 68 BPM | WEIGHT: 215.6 LBS | OXYGEN SATURATION: 99 % | HEIGHT: 67 IN | SYSTOLIC BLOOD PRESSURE: 148 MMHG

## 2021-05-21 DIAGNOSIS — E78.5 HYPERLIPIDEMIA, UNSPECIFIED HYPERLIPIDEMIA TYPE: ICD-10-CM

## 2021-05-21 DIAGNOSIS — E11.9 TYPE 2 DIABETES MELLITUS WITHOUT COMPLICATION, WITHOUT LONG-TERM CURRENT USE OF INSULIN (HCC): ICD-10-CM

## 2021-05-21 DIAGNOSIS — E66.09 CLASS 1 OBESITY DUE TO EXCESS CALORIES WITH SERIOUS COMORBIDITY AND BODY MASS INDEX (BMI) OF 33.0 TO 33.9 IN ADULT: ICD-10-CM

## 2021-05-21 DIAGNOSIS — K42.9 UMBILICAL HERNIA WITHOUT OBSTRUCTION AND WITHOUT GANGRENE: ICD-10-CM

## 2021-05-21 DIAGNOSIS — Z12.11 COLON CANCER SCREENING: ICD-10-CM

## 2021-05-21 DIAGNOSIS — I10 ESSENTIAL HYPERTENSION, BENIGN: ICD-10-CM

## 2021-05-21 DIAGNOSIS — I10 ESSENTIAL HYPERTENSION: ICD-10-CM

## 2021-05-21 DIAGNOSIS — M15.9 PRIMARY OSTEOARTHRITIS INVOLVING MULTIPLE JOINTS: ICD-10-CM

## 2021-05-21 DIAGNOSIS — E55.9 VITAMIN D DEFICIENCY: ICD-10-CM

## 2021-05-21 DIAGNOSIS — E11.9 DM TYPE 2, NOT AT GOAL (HCC): Primary | ICD-10-CM

## 2021-05-21 PROCEDURE — 3725F SCREEN DEPRESSION PERFORMED: CPT | Performed by: FAMILY MEDICINE

## 2021-05-21 PROCEDURE — 3077F SYST BP >= 140 MM HG: CPT | Performed by: FAMILY MEDICINE

## 2021-05-21 PROCEDURE — 3008F BODY MASS INDEX DOCD: CPT | Performed by: FAMILY MEDICINE

## 2021-05-21 PROCEDURE — 1101F PT FALLS ASSESS-DOCD LE1/YR: CPT | Performed by: FAMILY MEDICINE

## 2021-05-21 PROCEDURE — 99214 OFFICE O/P EST MOD 30 MIN: CPT | Performed by: FAMILY MEDICINE

## 2021-05-21 PROCEDURE — 36415 COLL VENOUS BLD VENIPUNCTURE: CPT | Performed by: FAMILY MEDICINE

## 2021-05-21 PROCEDURE — 3079F DIAST BP 80-89 MM HG: CPT | Performed by: FAMILY MEDICINE

## 2021-05-21 PROCEDURE — 1036F TOBACCO NON-USER: CPT | Performed by: FAMILY MEDICINE

## 2021-05-21 PROCEDURE — 3288F FALL RISK ASSESSMENT DOCD: CPT | Performed by: FAMILY MEDICINE

## 2021-05-21 RX ORDER — PRAVASTATIN SODIUM 80 MG/1
80 TABLET ORAL DAILY
Qty: 90 TABLET | Refills: 3 | Status: SHIPPED | OUTPATIENT
Start: 2021-05-21 | End: 2022-04-14

## 2021-05-21 RX ORDER — LISINOPRIL AND HYDROCHLOROTHIAZIDE 25; 20 MG/1; MG/1
1 TABLET ORAL DAILY
Qty: 90 TABLET | Refills: 3 | Status: SHIPPED | OUTPATIENT
Start: 2021-05-21 | End: 2022-04-14

## 2021-05-21 RX ORDER — GLIMEPIRIDE 2 MG/1
2 TABLET ORAL DAILY
Qty: 90 TABLET | Refills: 3 | Status: SHIPPED | OUTPATIENT
Start: 2021-05-21 | End: 2022-04-14

## 2021-05-21 RX ORDER — AMLODIPINE BESYLATE 10 MG/1
10 TABLET ORAL DAILY
Qty: 90 TABLET | Refills: 3 | Status: SHIPPED | OUTPATIENT
Start: 2021-05-21 | End: 2022-04-14

## 2021-05-21 RX ORDER — MELOXICAM 15 MG/1
15 TABLET ORAL DAILY
Qty: 90 TABLET | Refills: 3 | Status: SHIPPED | OUTPATIENT
Start: 2021-05-21 | End: 2022-04-14

## 2021-05-21 NOTE — PROGRESS NOTES
Assessment/Plan:   fasting labs drawn for CMP, lipid panel and hemoglobin A1c  Will heed results  Patient to increase amlodipine to 10 mg daily and continue other medications same  Patient instructed follow a low-fat, low-salt and a low sugar /carbohydrate diet more carefully and get regular aerobic exercise walking 150 minutes per week  Weight loss encouraged  Recommend regular home glucose monitoring checking fasting blood sugars  Follow up with specialists as scheduled return the office in 6 months  Recommend patient get COVID 19 vaccine  Diagnoses and all orders for this visit:    DM type 2, not at goal [facility])  -     Cancel: Comprehensive metabolic panel; Future  -     Cancel: Hemoglobin A1C With EAG; Future  -     Comprehensive metabolic panel  -     Hemoglobin A1C With EAG    Essential hypertension  -     Cancel: Comprehensive metabolic panel; Future  -     Comprehensive metabolic panel    Hyperlipidemia, unspecified hyperlipidemia type  -     pravastatin (PRAVACHOL) 80 mg tablet; Take 1 tablet (80 mg total) by mouth daily  -     Cancel: Comprehensive metabolic panel; Future  -     Cancel: Lipid panel; Future  -     Comprehensive metabolic panel  -     Lipid panel    Class 1 obesity due to excess calories with serious comorbidity and body mass index (BMI) of 33 0 to 33 9 in adult    Primary osteoarthritis involving multiple joints  -     meloxicam (MOBIC) 15 mg tablet; Take 1 tablet (15 mg total) by mouth daily    Essential hypertension, benign  -     amLODIPine (NORVASC) 10 mg tablet; Take 1 tablet (10 mg total) by mouth daily  -     lisinopril-hydrochlorothiazide (PRINZIDE,ZESTORETIC) 20-25 MG per tablet; Take 1 tablet by mouth daily    Type 2 diabetes mellitus without complication, without long-term current use of insulin (HCC)  -     metFORMIN (GLUCOPHAGE) 500 mg tablet; Take 2 tablets (1,000 mg total) by mouth 2 (two) times a day  -     glimepiride (AMARYL) 2 mg tablet;  Take 1 tablet (2 mg total) by mouth daily    Colon cancer screening  -     Cologuard; Future    Umbilical hernia without obstruction and without gangrene    Vitamin D deficiency          Subjective:      Patient ID: Alejandro Lee is a 73 y o  male  Patient is here for follow-up appoint for chronic conditions and fasting labs  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 and remains active doing yd work  He has been feeling well overall  Patient did not receive COVID vaccine yet and is considering it  Patient saw Dr Lisa Head, general surgeon regarding umbilical hernia and is considering surgery when his diabetes is under better control  Patient follows with Dr Patty Arcos, urologist yearly  Patient is up-to-date on diabetic eye exam and due for foot exam today  Patient is overdue for colonoscopy and declines colonoscopy at this time although agrees to Cologuard testing  Home glucose monitoring done occasionally reveals 8:00 p m  blood sugars   Patient has not been  checking  fasting blood sugars  Diabetes  He presents for his follow-up diabetic visit  He has type 2 diabetes mellitus  His disease course has been improving  There are no hypoglycemic associated symptoms  Pertinent negatives for hypoglycemia include no headaches  Pertinent negatives for diabetes include no blurred vision, no chest pain, no fatigue, no foot paresthesias, no foot ulcerations, no polydipsia, no polyuria, no visual change, no weakness and no weight loss  There are no hypoglycemic complications  Symptoms are improving  Pertinent negatives for diabetic complications include no CVA, heart disease, nephropathy, peripheral neuropathy or retinopathy  Risk factors for coronary artery disease include dyslipidemia, diabetes mellitus, hypertension, male sex and obesity  Current diabetic treatment includes oral agent (dual therapy)  He is compliant with treatment all of the time  His weight is decreasing steadily   He is following a generally healthy diet  He participates in exercise intermittently  His home blood glucose trend is decreasing steadily  His dinner blood glucose is taken after 8 pm  His dinner blood glucose range is generally 110-130 mg/dl  An ACE inhibitor/angiotensin II receptor blocker is being taken  He does not see a podiatrist Eye exam is current  Hypertension  This is a chronic problem  The problem is controlled  Pertinent negatives include no anxiety, blurred vision, chest pain, headaches, orthopnea, palpitations, peripheral edema, PND or shortness of breath  Past treatments include diuretics, calcium channel blockers and ACE inhibitors  The current treatment provides moderate improvement  Compliance problems include exercise and diet  There is no history of CAD/MI, CVA or retinopathy  Hyperlipidemia  This is a chronic problem  The problem is controlled  Exacerbating diseases include diabetes and obesity  He has no history of hypothyroidism  Pertinent negatives include no chest pain, focal sensory loss, focal weakness, leg pain, myalgias or shortness of breath  Current antihyperlipidemic treatment includes statins  The current treatment provides significant improvement of lipids  Compliance problems include adherence to exercise and adherence to diet  The following portions of the patient's history were reviewed and updated as appropriate: allergies, current medications, past family history, past medical history, past social history, past surgical history and problem list     Review of Systems   Constitutional: Negative for fatigue and weight loss  Eyes: Negative for blurred vision  Respiratory: Negative for shortness of breath  Cardiovascular: Negative for chest pain, palpitations, orthopnea and PND  Endocrine: Negative for polydipsia and polyuria  Musculoskeletal: Negative for myalgias  Neurological: Negative for focal weakness, weakness and headaches           Objective:      /88   Pulse 68   Temp 97 6 °F (36 4 °C) (Temporal)   Resp 16   Ht 5' 7" (1 702 m)   Wt 97 8 kg (215 lb 9 6 oz)   SpO2 99%   BMI 33 77 kg/m²     Patient's shoes and socks removed  Right Foot/Ankle   Right Foot Inspection  Skin Exam: skin normal and skin intact no dry skin, no warmth, no callus, no erythema, no maceration, no abnormal color, no pre-ulcer, no ulcer and no callus                            Sensory       Monofilament testing: intact  Vascular  Capillary refills: < 3 seconds  The right DP pulse is 2+  The right PT pulse is 2+  Left Foot/Ankle  Left Foot Inspection  Skin Exam: skin normal and skin intactno dry skin, no warmth, no erythema, no maceration, normal color, no pre-ulcer, no ulcer and no callus                                         Sensory       Monofilament: intact  Vascular  Capillary refills: < 3 seconds  The left DP pulse is 2+  The left PT pulse is 2+  Assign Risk Category:  No deformity present; No loss of protective sensation; No weak pulses       Risk: 0       Physical Exam  Constitutional:       General: He is not in acute distress  Appearance: Normal appearance  He is obese  HENT:      Head: Normocephalic  Mouth/Throat:      Mouth: Mucous membranes are moist    Eyes:      General: No scleral icterus  Conjunctiva/sclera: Conjunctivae normal    Neck:      Musculoskeletal: Neck supple  Vascular: No carotid bruit  Cardiovascular:      Rate and Rhythm: Normal rate and regular rhythm  Pulses: Normal pulses  no weak pulses          Dorsalis pedis pulses are 2+ on the right side and 2+ on the left side  Posterior tibial pulses are 2+ on the right side and 2+ on the left side  Pulmonary:      Effort: Pulmonary effort is normal       Breath sounds: Normal breath sounds  Abdominal:      Palpations: Abdomen is soft  Tenderness: There is no abdominal tenderness  Hernia: A hernia is present        Comments: Soft nontender umbilical hernia which is easily reducible  Musculoskeletal:      Right lower leg: No edema  Left lower leg: No edema  Feet:      Right foot:      Skin integrity: No ulcer, skin breakdown, erythema, warmth, callus or dry skin  Left foot:      Skin integrity: No ulcer, skin breakdown, erythema, warmth, callus or dry skin  Lymphadenopathy:      Cervical: No cervical adenopathy  Skin:     General: Skin is warm and dry  Neurological:      General: No focal deficit present  Mental Status: He is alert and oriented to person, place, and time  Psychiatric:         Mood and Affect: Mood normal          Behavior: Behavior normal          Thought Content: Thought content normal          Judgment: Judgment normal          BMI Counseling: Body mass index is 33 77 kg/m²  The BMI is above normal  Nutrition recommendations include reducing portion sizes, decreasing overall calorie intake, 3-5 servings of fruits/vegetables daily, reducing fast food intake, consuming healthier snacks, decreasing soda and/or juice intake, moderation in carbohydrate intake and reducing intake of saturated fat and trans fat  Exercise recommendations include moderate aerobic physical activity for 150 minutes/week

## 2021-05-22 LAB
ALBUMIN SERPL-MCNC: 4.7 G/DL (ref 3.6–5.1)
ALBUMIN/GLOB SERPL: 2 (CALC) (ref 1–2.5)
ALP SERPL-CCNC: 67 U/L (ref 35–144)
ALT SERPL-CCNC: 43 U/L (ref 9–46)
AST SERPL-CCNC: 23 U/L (ref 10–35)
BILIRUB SERPL-MCNC: 0.6 MG/DL (ref 0.2–1.2)
BUN SERPL-MCNC: 20 MG/DL (ref 7–25)
BUN/CREAT SERPL: ABNORMAL (CALC) (ref 6–22)
CALCIUM SERPL-MCNC: 9.7 MG/DL (ref 8.6–10.3)
CHLORIDE SERPL-SCNC: 105 MMOL/L (ref 98–110)
CHOLEST SERPL-MCNC: 163 MG/DL
CHOLEST/HDLC SERPL: 3 (CALC)
CO2 SERPL-SCNC: 23 MMOL/L (ref 20–32)
CREAT SERPL-MCNC: 0.81 MG/DL (ref 0.7–1.25)
EST. AVERAGE GLUCOSE BLD GHB EST-MCNC: 169 (CALC)
EST. AVERAGE GLUCOSE BLD GHB EST-SCNC: 9.3 (CALC)
GLOBULIN SER CALC-MCNC: 2.4 G/DL (CALC) (ref 1.9–3.7)
GLUCOSE SERPL-MCNC: 135 MG/DL (ref 65–99)
HBA1C MFR BLD: 7.5 % OF TOTAL HGB
HDLC SERPL-MCNC: 55 MG/DL
LDLC SERPL CALC-MCNC: 89 MG/DL (CALC)
NONHDLC SERPL-MCNC: 108 MG/DL (CALC)
POTASSIUM SERPL-SCNC: 4.1 MMOL/L (ref 3.5–5.3)
PROT SERPL-MCNC: 7.1 G/DL (ref 6.1–8.1)
SL AMB EGFR AFRICAN AMERICAN: 108 ML/MIN/1.73M2
SL AMB EGFR NON AFRICAN AMERICAN: 93 ML/MIN/1.73M2
SODIUM SERPL-SCNC: 140 MMOL/L (ref 135–146)
TRIGL SERPL-MCNC: 96 MG/DL

## 2021-05-22 PROCEDURE — 3051F HG A1C>EQUAL 7.0%<8.0%: CPT | Performed by: FAMILY MEDICINE

## 2021-06-09 DIAGNOSIS — R19.5 POSITIVE COLORECTAL CANCER SCREENING USING COLOGUARD TEST: Primary | ICD-10-CM

## 2021-07-28 ENCOUNTER — OFFICE VISIT (OUTPATIENT)
Dept: SURGERY | Facility: CLINIC | Age: 66
End: 2021-07-28
Payer: COMMERCIAL

## 2021-07-28 ENCOUNTER — HOSPITAL ENCOUNTER (OUTPATIENT)
Dept: NON INVASIVE DIAGNOSTICS | Facility: HOSPITAL | Age: 66
Discharge: HOME | End: 2021-07-28
Attending: SURGERY
Payer: COMMERCIAL

## 2021-07-28 ENCOUNTER — APPOINTMENT (OUTPATIENT)
Dept: LAB | Facility: HOSPITAL | Age: 66
End: 2021-07-28
Attending: SURGERY
Payer: COMMERCIAL

## 2021-07-28 VITALS
DIASTOLIC BLOOD PRESSURE: 83 MMHG | TEMPERATURE: 96.4 F | RESPIRATION RATE: 20 BRPM | HEIGHT: 67 IN | BODY MASS INDEX: 33.93 KG/M2 | HEART RATE: 75 BPM | WEIGHT: 216.2 LBS | SYSTOLIC BLOOD PRESSURE: 155 MMHG

## 2021-07-28 DIAGNOSIS — K42.9 UMBILICAL HERNIA WITHOUT OBSTRUCTION OR GANGRENE: ICD-10-CM

## 2021-07-28 DIAGNOSIS — K42.9 UMBILICAL HERNIA WITHOUT OBSTRUCTION OR GANGRENE: Primary | ICD-10-CM

## 2021-07-28 LAB
ANION GAP SERPL CALCULATED.3IONS-SCNC: 12 MMOL/L (ref 4–13)
ATRIAL RATE: 88 BPM
BASOPHILS # BLD AUTO: 0.04 THOUSANDS/ΜL (ref 0–0.1)
BASOPHILS NFR BLD AUTO: 1 % (ref 0–1)
BUN SERPL-MCNC: 23 MG/DL (ref 5–25)
CALCIUM SERPL-MCNC: 9 MG/DL (ref 8.3–10.1)
CHLORIDE SERPL-SCNC: 104 MMOL/L (ref 100–108)
CO2 SERPL-SCNC: 25 MMOL/L (ref 21–32)
CREAT SERPL-MCNC: 1.01 MG/DL (ref 0.6–1.3)
EOSINOPHIL # BLD AUTO: 0.16 THOUSAND/ΜL (ref 0–0.61)
EOSINOPHIL NFR BLD AUTO: 2 % (ref 0–6)
ERYTHROCYTE [DISTWIDTH] IN BLOOD BY AUTOMATED COUNT: 12.4 % (ref 11.6–15.1)
EST. AVERAGE GLUCOSE BLD GHB EST-MCNC: 143 MG/DL
GFR SERPL CREATININE-BSD FRML MDRD: 78 ML/MIN/1.73SQ M
GLUCOSE SERPL-MCNC: 138 MG/DL (ref 65–140)
HBA1C MFR BLD: 6.6 %
HCT VFR BLD AUTO: 43.3 % (ref 36.5–49.3)
HGB BLD-MCNC: 14.3 G/DL (ref 12–17)
IMM GRANULOCYTES # BLD AUTO: 0.03 THOUSAND/UL (ref 0–0.2)
IMM GRANULOCYTES NFR BLD AUTO: 0 % (ref 0–2)
LYMPHOCYTES # BLD AUTO: 1.78 THOUSANDS/ΜL (ref 0.6–4.47)
LYMPHOCYTES NFR BLD AUTO: 22 % (ref 14–44)
MCH RBC QN AUTO: 30.1 PG (ref 26.8–34.3)
MCHC RBC AUTO-ENTMCNC: 33 G/DL (ref 31.4–37.4)
MCV RBC AUTO: 91 FL (ref 82–98)
MONOCYTES # BLD AUTO: 0.55 THOUSAND/ΜL (ref 0.17–1.22)
MONOCYTES NFR BLD AUTO: 7 % (ref 4–12)
NEUTROPHILS # BLD AUTO: 5.51 THOUSANDS/ΜL (ref 1.85–7.62)
NEUTS SEG NFR BLD AUTO: 68 % (ref 43–75)
NRBC BLD AUTO-RTO: 0 /100 WBCS
P AXIS: 43 DEGREES
PLATELET # BLD AUTO: 266 THOUSANDS/UL (ref 149–390)
PMV BLD AUTO: 11.3 FL (ref 8.9–12.7)
POTASSIUM SERPL-SCNC: 3.9 MMOL/L (ref 3.5–5.3)
PR INTERVAL: 180 MS
QRS AXIS: -47 DEGREES
QRSD INTERVAL: 108 MS
QT INTERVAL: 362 MS
QTC INTERVAL: 438 MS
RBC # BLD AUTO: 4.75 MILLION/UL (ref 3.88–5.62)
SODIUM SERPL-SCNC: 141 MMOL/L (ref 136–145)
T WAVE AXIS: 46 DEGREES
VENTRICULAR RATE: 88 BPM
WBC # BLD AUTO: 8.07 THOUSAND/UL (ref 4.31–10.16)

## 2021-07-28 PROCEDURE — 80048 BASIC METABOLIC PNL TOTAL CA: CPT

## 2021-07-28 PROCEDURE — 3079F DIAST BP 80-89 MM HG: CPT | Performed by: SURGERY

## 2021-07-28 PROCEDURE — 93010 ELECTROCARDIOGRAM REPORT: CPT | Performed by: INTERNAL MEDICINE

## 2021-07-28 PROCEDURE — 3077F SYST BP >= 140 MM HG: CPT | Performed by: SURGERY

## 2021-07-28 PROCEDURE — 85025 COMPLETE CBC W/AUTO DIFF WBC: CPT

## 2021-07-28 PROCEDURE — 3044F HG A1C LEVEL LT 7.0%: CPT | Performed by: SURGERY

## 2021-07-28 PROCEDURE — 99213 OFFICE O/P EST LOW 20 MIN: CPT | Performed by: SURGERY

## 2021-07-28 PROCEDURE — 36415 COLL VENOUS BLD VENIPUNCTURE: CPT

## 2021-07-28 PROCEDURE — 83036 HEMOGLOBIN GLYCOSYLATED A1C: CPT

## 2021-07-28 PROCEDURE — 3008F BODY MASS INDEX DOCD: CPT | Performed by: SURGERY

## 2021-07-28 PROCEDURE — 1036F TOBACCO NON-USER: CPT | Performed by: SURGERY

## 2021-07-28 PROCEDURE — 93005 ELECTROCARDIOGRAM TRACING: CPT | Performed by: SURGERY

## 2021-07-28 RX ORDER — CEFAZOLIN SODIUM 2 G/50ML
2000 SOLUTION INTRAVENOUS ONCE
Status: CANCELLED | OUTPATIENT
Start: 2021-08-05 | End: 2021-08-05

## 2021-07-28 RX ORDER — SODIUM CHLORIDE, SODIUM LACTATE, POTASSIUM CHLORIDE, CALCIUM CHLORIDE 600; 310; 30; 20 MG/100ML; MG/100ML; MG/100ML; MG/100ML
125 INJECTION, SOLUTION INTRAVENOUS CONTINUOUS
Status: CANCELLED | OUTPATIENT
Start: 2021-08-05

## 2021-07-28 NOTE — ASSESSMENT & PLAN NOTE
Will plan on and umbilical hernia repair at [address]   The fascial defect may be small enough that he may not require mesh but this would based on intraoperative findings  The risks benefits and alternatives were explained to him and he is agreeable to proceed  I did mention the possibility of waiting for after his colonoscopy just in case there are any significant findings that would require surgery however he wants to get this hernia fixed now and is not interested in waiting    In addition his A1c is improved significantly and  will schedule him for surgery next week

## 2021-07-28 NOTE — H&P (VIEW-ONLY)
Assessment/Plan:    Umbilical hernia without obstruction or gangrene    Will plan on and umbilical hernia repair at [facility]   The fascial defect may be small enough that he may not require mesh but this would based on intraoperative findings  The risks benefits and alternatives were explained to him and he is agreeable to proceed  I did mention the possibility of waiting for after his colonoscopy just in case there are any significant findings that would require surgery however he wants to get this hernia fixed now and is not interested in waiting  In addition his A1c is improved significantly and  will schedule him for surgery next week       Diagnoses and all orders for this visit:    Umbilical hernia without obstruction or gangrene  -     Case request operating room: [address] [place] [address]; Standing  -     CBC and differential; Future  -     Basic metabolic panel; Future  -     HEMOGLOBIN A1C W/ EAG ESTIMATION; Future  -     EKG 12 lead; Future  -     Case request operating room: REPAIR HERNIA UMBILICAL    Other orders  -     Diet NPO; Sips with meds; Standing  -     Apply Sequential Compression Device; Standing  -     Place sequential compression device; Standing  -     Reason for no Mechanical VTE Prophylaxis; Standing  -     lactated ringers infusion  -     ceFAZolin (ANCEF) 2,000 mg in dextrose 5 % 100 mL IVPB          Subjective:      Patient ID: Moy Abdi is a 73 y o  male  Mr  Jose D Romano   Is a 55-year-old gentleman is here for evaluation of umbilical hernia  He states that the umbilical hernia for years only recently has he had issues with it  He had an episode where he had increasing pain and difficulty reducing it  He denied nausea vomiting fevers or chills  He works a very physical job and is interested in having his hernia fixed  His A1c was only 6 6 in April 19 however his most recent blood work showed a significant increase up to 9    He does follow with his primary care physician and is working on diet modification     07/20/2021  He returns for re-evaluation  Since being seen he has had significant improvement in his glucose control and his A1c was 7 5  He had an episode month or so ago where he felt hernia was stuck out and painful for week or 2 but then it to reduce on its own  He is here now ready for repair  In addition he did have the color guard test which was positive and he has evaluation with Gastroenterology next month  The following portions of the patient's history were reviewed and updated as appropriate: allergies, current medications, past family history, past medical history, past social history, past surgical history and problem list     Review of Systems   Constitutional: Negative for appetite change, chills and fever  HENT: Negative for congestion and ear pain  Eyes: Negative for discharge and itching  Respiratory: Negative for chest tightness and shortness of breath  Cardiovascular: Negative for chest pain and palpitations  Gastrointestinal: Positive for abdominal pain  Negative for abdominal distention and nausea  Genitourinary: Negative for difficulty urinating and frequency  Musculoskeletal: Negative for arthralgias and gait problem  Skin: Negative for color change and rash  Neurological: Negative for dizziness and numbness  Psychiatric/Behavioral: Negative for agitation and confusion  Objective:      /83   Pulse 75   Temp (!) 96 4 °F (35 8 °C)   Resp 20   Ht 5' 7" (1 702 m)   Wt 98 1 kg (216 lb 3 2 oz)   BMI 33 86 kg/m²          Physical Exam  Vitals and nursing note reviewed  Constitutional:       General: He is not in acute distress  Appearance: He is well-developed  He is not diaphoretic  HENT:      Head: Normocephalic and atraumatic  Eyes:      Pupils: Pupils are equal, round, and reactive to light  Cardiovascular:      Rate and Rhythm: Normal rate and regular rhythm        Heart sounds: Normal heart sounds  No murmur heard  Pulmonary:      Effort: Pulmonary effort is normal  No respiratory distress  Breath sounds: Normal breath sounds  No wheezing  Abdominal:      General: Bowel sounds are normal       Palpations: Abdomen is soft  Hernia: A hernia is present  Comments:  Large rectus diastasis  Reducible umbilical hernia with a small fascial defect   Musculoskeletal:         General: Normal range of motion  Cervical back: Normal range of motion and neck supple  Skin:     General: Skin is warm and dry  Neurological:      Mental Status: He is alert and oriented to person, place, and time     Psychiatric:         Behavior: Behavior normal

## 2021-07-28 NOTE — PROGRESS NOTES
Assessment/Plan:    Umbilical hernia without obstruction or gangrene    Will plan on and umbilical hernia repair at [facility]   The fascial defect may be small enough that he may not require mesh but this would based on intraoperative findings  The risks benefits and alternatives were explained to him and he is agreeable to proceed  I did mention the possibility of waiting for after his colonoscopy just in case there are any significant findings that would require surgery however he wants to get this hernia fixed now and is not interested in waiting  In addition his A1c is improved significantly and  will schedule him for surgery next week       Diagnoses and all orders for this visit:    Umbilical hernia without obstruction or gangrene  -     Case request operating room: [address] [place] [address]; Standing  -     CBC and differential; Future  -     Basic metabolic panel; Future  -     HEMOGLOBIN A1C W/ EAG ESTIMATION; Future  -     EKG 12 lead; Future  -     Case request operating room: REPAIR HERNIA UMBILICAL    Other orders  -     Diet NPO; Sips with meds; Standing  -     Apply Sequential Compression Device; Standing  -     Place sequential compression device; Standing  -     Reason for no Mechanical VTE Prophylaxis; Standing  -     lactated ringers infusion  -     ceFAZolin (ANCEF) 2,000 mg in dextrose 5 % 100 mL IVPB          Subjective:      Patient ID: Alana Prince is a 73 y o  male  Mr June Vegas   Is a 67-year-old gentleman is here for evaluation of umbilical hernia  He states that the umbilical hernia for years only recently has he had issues with it  He had an episode where he had increasing pain and difficulty reducing it  He denied nausea vomiting fevers or chills  He works a very physical job and is interested in having his hernia fixed  His A1c was only 6 6 in April 19 however his most recent blood work showed a significant increase up to 9    He does follow with his primary care physician and is working on diet modification     07/20/2021  He returns for re-evaluation  Since being seen he has had significant improvement in his glucose control and his A1c was 7 5  He had an episode month or so ago where he felt hernia was stuck out and painful for week or 2 but then it to reduce on its own  He is here now ready for repair  In addition he did have the color guard test which was positive and he has evaluation with Gastroenterology next month  The following portions of the patient's history were reviewed and updated as appropriate: allergies, current medications, past family history, past medical history, past social history, past surgical history and problem list     Review of Systems   Constitutional: Negative for appetite change, chills and fever  HENT: Negative for congestion and ear pain  Eyes: Negative for discharge and itching  Respiratory: Negative for chest tightness and shortness of breath  Cardiovascular: Negative for chest pain and palpitations  Gastrointestinal: Positive for abdominal pain  Negative for abdominal distention and nausea  Genitourinary: Negative for difficulty urinating and frequency  Musculoskeletal: Negative for arthralgias and gait problem  Skin: Negative for color change and rash  Neurological: Negative for dizziness and numbness  Psychiatric/Behavioral: Negative for agitation and confusion  Objective:      /83   Pulse 75   Temp (!) 96 4 °F (35 8 °C)   Resp 20   Ht 5' 7" (1 702 m)   Wt 98 1 kg (216 lb 3 2 oz)   BMI 33 86 kg/m²          Physical Exam  Vitals and nursing note reviewed  Constitutional:       General: He is not in acute distress  Appearance: He is well-developed  He is not diaphoretic  HENT:      Head: Normocephalic and atraumatic  Eyes:      Pupils: Pupils are equal, round, and reactive to light  Cardiovascular:      Rate and Rhythm: Normal rate and regular rhythm        Heart sounds: Normal heart sounds  No murmur heard  Pulmonary:      Effort: Pulmonary effort is normal  No respiratory distress  Breath sounds: Normal breath sounds  No wheezing  Abdominal:      General: Bowel sounds are normal       Palpations: Abdomen is soft  Hernia: A hernia is present  Comments:  Large rectus diastasis  Reducible umbilical hernia with a small fascial defect   Musculoskeletal:         General: Normal range of motion  Cervical back: Normal range of motion and neck supple  Skin:     General: Skin is warm and dry  Neurological:      Mental Status: He is alert and oriented to person, place, and time     Psychiatric:         Behavior: Behavior normal

## 2021-08-03 NOTE — PRE-PROCEDURE INSTRUCTIONS
Pre-Surgery Instructions:   Medication Instructions    amLODIPine (NORVASC) 10 mg tablet Instructed patient per Anesthesia Guidelines   aspirin (ECOTRIN LOW STRENGTH) 81 mg EC tablet Instructed patient per Anesthesia Guidelines   Cholecalciferol (VITAMIN D) 2000 units CAPS Instructed patient per Anesthesia Guidelines   glimepiride (AMARYL) 2 mg tablet Instructed patient per Anesthesia Guidelines   lisinopril-hydrochlorothiazide (PRINZIDE,ZESTORETIC) 20-25 MG per tablet Instructed patient per Anesthesia Guidelines   meloxicam (MOBIC) 15 mg tablet Instructed patient per Anesthesia Guidelines   metFORMIN (GLUCOPHAGE) 500 mg tablet Instructed patient per Anesthesia Guidelines   pravastatin (PRAVACHOL) 80 mg tablet Instructed patient per Anesthesia Guidelines  Pt instructed to take norvasc the morning of surgery with a small sip of water  [facility] preop instructions reviewed with pt  Pt has surgical soap

## 2021-08-04 ENCOUNTER — ANESTHESIA EVENT (OUTPATIENT)
Dept: PERIOP | Facility: HOSPITAL | Age: 66
End: 2021-08-04
Payer: COMMERCIAL

## 2021-08-05 ENCOUNTER — ANESTHESIA (OUTPATIENT)
Dept: PERIOP | Facility: HOSPITAL | Age: 66
End: 2021-08-05
Payer: COMMERCIAL

## 2021-08-05 ENCOUNTER — HOSPITAL ENCOUNTER (OUTPATIENT)
Facility: HOSPITAL | Age: 66
Setting detail: OUTPATIENT SURGERY
Discharge: HOME | End: 2021-08-05
Attending: SURGERY | Admitting: SURGERY
Payer: COMMERCIAL

## 2021-08-05 VITALS
HEIGHT: 67 IN | TEMPERATURE: 97.8 F | RESPIRATION RATE: 18 BRPM | DIASTOLIC BLOOD PRESSURE: 79 MMHG | OXYGEN SATURATION: 94 % | BODY MASS INDEX: 33.9 KG/M2 | SYSTOLIC BLOOD PRESSURE: 156 MMHG | WEIGHT: 216 LBS | HEART RATE: 85 BPM

## 2021-08-05 DIAGNOSIS — K42.9 UMBILICAL HERNIA WITHOUT OBSTRUCTION OR GANGRENE: ICD-10-CM

## 2021-08-05 LAB
GLUCOSE SERPL-MCNC: 157 MG/DL (ref 65–140)
GLUCOSE SERPL-MCNC: 183 MG/DL (ref 65–140)

## 2021-08-05 PROCEDURE — 49585 PR REPAIR UMBILICAL HERN,5+Y/O,REDUC: CPT | Performed by: SURGERY

## 2021-08-05 PROCEDURE — 82948 REAGENT STRIP/BLOOD GLUCOSE: CPT

## 2021-08-05 PROCEDURE — 88302 TISSUE EXAM BY PATHOLOGIST: CPT | Performed by: PATHOLOGY

## 2021-08-05 RX ORDER — PROPOFOL 10 MG/ML
INJECTION, EMULSION INTRAVENOUS AS NEEDED
Status: DISCONTINUED | OUTPATIENT
Start: 2021-08-05 | End: 2021-08-05

## 2021-08-05 RX ORDER — ONDANSETRON 2 MG/ML
INJECTION INTRAMUSCULAR; INTRAVENOUS AS NEEDED
Status: DISCONTINUED | OUTPATIENT
Start: 2021-08-05 | End: 2021-08-05

## 2021-08-05 RX ORDER — MORPHINE SULFATE 10 MG/ML
2 INJECTION, SOLUTION INTRAMUSCULAR; INTRAVENOUS
Status: DISCONTINUED | OUTPATIENT
Start: 2021-08-05 | End: 2021-08-05 | Stop reason: HOSPADM

## 2021-08-05 RX ORDER — FENTANYL CITRATE 50 UG/ML
INJECTION, SOLUTION INTRAMUSCULAR; INTRAVENOUS AS NEEDED
Status: DISCONTINUED | OUTPATIENT
Start: 2021-08-05 | End: 2021-08-05

## 2021-08-05 RX ORDER — MIDAZOLAM HYDROCHLORIDE 2 MG/2ML
INJECTION, SOLUTION INTRAMUSCULAR; INTRAVENOUS AS NEEDED
Status: DISCONTINUED | OUTPATIENT
Start: 2021-08-05 | End: 2021-08-05

## 2021-08-05 RX ORDER — NEOSTIGMINE METHYLSULFATE 1 MG/ML
INJECTION INTRAVENOUS AS NEEDED
Status: DISCONTINUED | OUTPATIENT
Start: 2021-08-05 | End: 2021-08-05

## 2021-08-05 RX ORDER — LIDOCAINE HYDROCHLORIDE 10 MG/ML
INJECTION, SOLUTION EPIDURAL; INFILTRATION; INTRACAUDAL; PERINEURAL AS NEEDED
Status: DISCONTINUED | OUTPATIENT
Start: 2021-08-05 | End: 2021-08-05

## 2021-08-05 RX ORDER — CEFAZOLIN SODIUM 2 G/50ML
2000 SOLUTION INTRAVENOUS ONCE
Status: COMPLETED | OUTPATIENT
Start: 2021-08-05 | End: 2021-08-05

## 2021-08-05 RX ORDER — OXYCODONE HYDROCHLORIDE 5 MG/1
5 TABLET ORAL EVERY 4 HOURS PRN
Qty: 16 TABLET | Refills: 0 | Status: SHIPPED | OUTPATIENT
Start: 2021-08-05 | End: 2021-08-05 | Stop reason: SDUPTHER

## 2021-08-05 RX ORDER — SODIUM CHLORIDE 9 MG/ML
125 INJECTION, SOLUTION INTRAVENOUS CONTINUOUS
Status: DISCONTINUED | OUTPATIENT
Start: 2021-08-05 | End: 2021-08-05 | Stop reason: HOSPADM

## 2021-08-05 RX ORDER — ONDANSETRON 2 MG/ML
4 INJECTION INTRAMUSCULAR; INTRAVENOUS ONCE AS NEEDED
Status: DISCONTINUED | OUTPATIENT
Start: 2021-08-05 | End: 2021-08-05 | Stop reason: HOSPADM

## 2021-08-05 RX ORDER — FENTANYL CITRATE/PF 50 MCG/ML
25 SYRINGE (ML) INJECTION
Status: DISCONTINUED | OUTPATIENT
Start: 2021-08-05 | End: 2021-08-05 | Stop reason: HOSPADM

## 2021-08-05 RX ORDER — BUPIVACAINE HYDROCHLORIDE 5 MG/ML
INJECTION, SOLUTION PERINEURAL AS NEEDED
Status: DISCONTINUED | OUTPATIENT
Start: 2021-08-05 | End: 2021-08-05 | Stop reason: HOSPADM

## 2021-08-05 RX ORDER — OXYCODONE HYDROCHLORIDE 5 MG/1
5 TABLET ORAL EVERY 4 HOURS PRN
Qty: 16 TABLET | Refills: 0 | Status: SHIPPED | OUTPATIENT
Start: 2021-08-05 | End: 2021-08-15

## 2021-08-05 RX ORDER — GLYCOPYRROLATE 0.2 MG/ML
INJECTION INTRAMUSCULAR; INTRAVENOUS AS NEEDED
Status: DISCONTINUED | OUTPATIENT
Start: 2021-08-05 | End: 2021-08-05

## 2021-08-05 RX ORDER — DEXAMETHASONE SODIUM PHOSPHATE 10 MG/ML
INJECTION, SOLUTION INTRAMUSCULAR; INTRAVENOUS AS NEEDED
Status: DISCONTINUED | OUTPATIENT
Start: 2021-08-05 | End: 2021-08-05

## 2021-08-05 RX ORDER — ROCURONIUM BROMIDE 10 MG/ML
INJECTION, SOLUTION INTRAVENOUS AS NEEDED
Status: DISCONTINUED | OUTPATIENT
Start: 2021-08-05 | End: 2021-08-05

## 2021-08-05 RX ORDER — OXYCODONE HYDROCHLORIDE AND ACETAMINOPHEN 5; 325 MG/1; MG/1
2 TABLET ORAL EVERY 6 HOURS PRN
Status: DISCONTINUED | OUTPATIENT
Start: 2021-08-05 | End: 2021-08-05 | Stop reason: HOSPADM

## 2021-08-05 RX ORDER — EPHEDRINE SULFATE 50 MG/ML
INJECTION INTRAVENOUS AS NEEDED
Status: DISCONTINUED | OUTPATIENT
Start: 2021-08-05 | End: 2021-08-05

## 2021-08-05 RX ORDER — OXYCODONE HYDROCHLORIDE AND ACETAMINOPHEN 5; 325 MG/1; MG/1
1 TABLET ORAL EVERY 4 HOURS PRN
Status: DISCONTINUED | OUTPATIENT
Start: 2021-08-05 | End: 2021-08-05 | Stop reason: HOSPADM

## 2021-08-05 RX ADMIN — FENTANYL CITRATE 50 MCG: 50 INJECTION INTRAMUSCULAR; INTRAVENOUS at 13:51

## 2021-08-05 RX ADMIN — EPHEDRINE SULFATE 10 MG: 50 INJECTION, SOLUTION INTRAVENOUS at 14:16

## 2021-08-05 RX ADMIN — FENTANYL CITRATE 25 MCG: 50 INJECTION INTRAMUSCULAR; INTRAVENOUS at 14:59

## 2021-08-05 RX ADMIN — DEXAMETHASONE SODIUM PHOSPHATE 4 MG: 10 INJECTION, SOLUTION INTRAMUSCULAR; INTRAVENOUS at 14:07

## 2021-08-05 RX ADMIN — OXYCODONE HYDROCHLORIDE AND ACETAMINOPHEN 1 TABLET: 5; 325 TABLET ORAL at 16:31

## 2021-08-05 RX ADMIN — SODIUM CHLORIDE 125 ML/HR: 0.9 INJECTION, SOLUTION INTRAVENOUS at 11:57

## 2021-08-05 RX ADMIN — SODIUM CHLORIDE: 0.9 INJECTION, SOLUTION INTRAVENOUS at 14:14

## 2021-08-05 RX ADMIN — ROCURONIUM BROMIDE 30 MG: 50 INJECTION, SOLUTION INTRAVENOUS at 13:53

## 2021-08-05 RX ADMIN — NEOSTIGMINE METHYLSULFATE 2.5 MG: 1 INJECTION INTRAVENOUS at 14:30

## 2021-08-05 RX ADMIN — MIDAZOLAM 2 MG: 1 INJECTION INTRAMUSCULAR; INTRAVENOUS at 13:51

## 2021-08-05 RX ADMIN — FENTANYL CITRATE 50 MCG: 50 INJECTION INTRAMUSCULAR; INTRAVENOUS at 14:01

## 2021-08-05 RX ADMIN — GLYCOPYRROLATE 0.4 MG: 0.2 INJECTION, SOLUTION INTRAMUSCULAR; INTRAVENOUS at 14:30

## 2021-08-05 RX ADMIN — CEFAZOLIN SODIUM 2000 MG: 2 SOLUTION INTRAVENOUS at 13:52

## 2021-08-05 RX ADMIN — PROPOFOL 200 MG: 10 INJECTION, EMULSION INTRAVENOUS at 13:53

## 2021-08-05 RX ADMIN — ONDANSETRON 4 MG: 2 INJECTION INTRAMUSCULAR; INTRAVENOUS at 14:07

## 2021-08-05 RX ADMIN — FENTANYL CITRATE 25 MCG: 50 INJECTION INTRAMUSCULAR; INTRAVENOUS at 15:10

## 2021-08-05 RX ADMIN — LIDOCAINE HYDROCHLORIDE 80 MG: 10 INJECTION, SOLUTION EPIDURAL; INFILTRATION; INTRACAUDAL; PERINEURAL at 13:53

## 2021-08-05 NOTE — INTERVAL H&P NOTE
H&P reviewed  After examining the patient I find no changes in the patients condition since the H&P had been written      Vitals:    08/05/21 1205   BP: (!) 176/92   Pulse:    Resp:    Temp:    SpO2:

## 2021-08-05 NOTE — ANESTHESIA PREPROCEDURE EVALUATION
Procedure:  REPAIR HERNIA UMBILICAL (N/A Abdomen)    Relevant Problems   ANESTHESIA (within normal limits)      CARDIO   (+) Essential hypertension   (+) Hyperlipidemia      ENDO (within normal limits)      GI/HEPATIC (within normal limits)      MUSCULOSKELETAL   (+) Osteoarthritis   (+) Osteoarthritis of knee   (+) Osteoarthrosis of knee      NEURO/PSYCH   (+) History of fracture      PULMONARY (within normal limits)        Physical Exam    Airway    Mallampati score: II  TM Distance: >3 FB  Neck ROM: full     Dental       Cardiovascular  Cardiovascular exam normal    Pulmonary  Pulmonary exam normal     Other Findings        Anesthesia Plan  ASA Score- 2     Anesthesia Type- general with ASA Monitors  Additional Monitors:   Airway Plan: ETT  Plan Factors-    Chart reviewed  Induction- intravenous  Postoperative Plan-     Informed Consent- Anesthetic plan and risks discussed with patient

## 2021-08-05 NOTE — OP NOTE
OPERATIVE REPORT  PATIENT NAME: Alicia Sotomayor    :  1955  MRN: [id number]  Pt Location: [facility] OR ROOM 08    SURGERY DATE: 2021    Surgeon(s) and Role:     * Yancy Morales MD - Primary     * Ariana Rivero MD - Assisting     * Madie Tapia MD - Assisting    Preop Diagnosis:  Umbilical hernia without obstruction or gangrene [K42 9]    Post-Op Diagnosis Codes:     * Umbilical hernia without obstruction or gangrene [K42 9]    Procedure(s) (LRB):  REPAIR HERNIA UMBILICAL (N/A)    Specimen(s):  ID Type Source Tests Collected by Time Destination   1 : UMBILLICAL HERNIA [facility] Tissue Soft Tissue, Other TISSUE EXAM Yancy Morales MD 2021 1431        Estimated Blood Loss:   Minimal    Drains:  * No LDAs found *    Anesthesia Type:   General    Operative Indications:  Umbilical hernia without obstruction or gangrene [K42 9]      Operative Findings:  Fascial defect less than 1 cm    Complications:   None    Procedure and Technique:  The patient was seen in the Holding Room  The risks, benefits, complications, treatment options, and expected outcomes were discussed with the patient  The possibilities of reaction to medication, pulmonary aspiration, perforation of viscus, bleeding, recurrent infection, the need for additional procedures, failure to diagnose a condition, and creating a complication requiring transfusion or operation were discussed with the patient  The patient concurred with the proposed plan, giving informed consent  The site of surgery properly noted/marked  The patient was taken to Operating Room, identified as Alicia Sotomayor and staff verified patient name, , and procedure  A Time Out was held and the above information confirmed  The patient was placed supine  After establishing general anesthesia, the abdomen was prepped and draped in standard fashion  Local anesthesia was used at the incision  An infraumbilical incision was made    Dissection was carried down to the hernia sac located above the fascia and was mobilized from surrounding structures  The hernia sac was removed and sent to pathology      The defect was quite small and deemed inappropriate for mesh placement  Intact fascia was identified circumferentially around the defect  The Fascia was closed with interrupted 2-0 Prolene sutures  The umbilicus was re-created using 3-0 Vicryl sutures  The soft tissue was irrigated and closed in layers,using Vicryl sutures  Hemostasis was confirmed  The skin incision was closed in layers with a 4-0 Monocryl subcuticular closure  Histocryl glue was used  Instrument, sponge, and needle counts were correct prior to closure and at the conclusion of the case  This text is generated with voice recognition software  There may be translation, syntax,  or grammatical errors  If you have any questions, please contact the dictating provider            I was present for the entire procedure    Patient Disposition:  PACU     SIGNATURE: Ronnie Landis MD  DATE: August 5, 2021  TIME: 2:32 PM

## 2021-08-05 NOTE — DISCHARGE INSTRUCTIONS
[facility]  Post-Operative Care Instructions  Dr Michaela Rojas MD, Sharon Gutierrez  [phone]    1  General: You will feel pulling sensations around the wound or funny aches and pains around the incisions  This is normal  Even minor surgery is a change in your body and this is your bodys way of reaction to it  If you have had abdominal surgery, it may help to support the incision with a small pillow or blanket for comfort when moving or coughing  2  Wound care:  Okay to shower  The glue will fall off over the next week or 2     3  Water: You may shower over the wound, unless there are drain tubes left in place  Do not bathe or use a pool or hot tub until cleared by the physician  4  Activity: You may go up and down stairs, walk as much as you are comfortable, but walk at least 3 times each day  If you have had abdominal surgery, do not lift anything heavier than 20 pounds for at least 4 weeks, unless cleared by the doctor  5  Diet: You may resume a regular diet  If you had a same-day surgery or overnight stay surgery, he may wish to eat lightly for a few days: soups, crackers, and sandwiches  You may resume a regular diet when ready  6  Medications: Resume all of your previous medications, unless told otherwise by the doctor  A good option for pain control is to start with Tylenol and ibuprofen and alternate taking them every 2 hours for 1-2 days  If this is not sufficient then substituted the Tylenol for the narcotic pain medicine prescribed  Insure that you do not take more than 4000 mg of Tylenol per day  You do not need to take the narcotic pain medications unless you are having significant pain and discomfort  7  Driving: You will need someone to drive you home on the day of surgery  Do not drive or make any important decisions while on narcotic pain medication or 24 hours and after anesthesia or sedation for surgery   Generally, you may drive when your off all narcotic pain medications  8  Upset Stomach: You may take Maalox, Tums, or similar items for an upset stomach  If your narcotic pain medication causes an upset stomach, do not take it on an empty stomach  Try taking it with at least some crackers or toast      9  Constipation: Patients often experienced constipation after surgery  You may take over-the-counter medication for this, such as Metamucil, Senokot, Dulcolax, milk of magnesia, etc  You may take a suppository unless you have had anorectal surgery such as a procedure on your hemorrhoids  If you experience significant nausea or vomiting after abdominal surgery, call the office before trying any of these medications  10  Call the office: If you are experiencing any of the following, fevers above 101 5°, significant nausea or vomiting, if the wound develops drainage and/or is excessive redness around the wound, or if you have significant diarrhea or other worsening symptoms  11  Pain: You may be given a prescription for pain  This will be given to the hospital, the day of surgery  12  Sexual Activity: You may resume sexual activity when you feel ready and comfortable and your incision is sealed and healed without apparent infection risk

## 2021-08-09 ENCOUNTER — TELEPHONE (OUTPATIENT)
Dept: SURGERY | Facility: CLINIC | Age: 66
End: 2021-08-09

## 2021-08-09 NOTE — TELEPHONE ENCOUNTER
" Today around 530, I started to have a weird pain in my throat, I cannot swallow or talk" Patient denies any drooling, able to make clear sentences Two day post op call  Spoke to patient  Patient is doing well  Pain well controlled, patient ambulating well  No questions or concerns  Reminded patient of post op appointment and to call if there are any questions or concerns prior to appointment

## 2021-08-18 ENCOUNTER — OFFICE VISIT (OUTPATIENT)
Dept: SURGERY | Facility: CLINIC | Age: 66
End: 2021-08-18

## 2021-08-18 VITALS
WEIGHT: 210 LBS | HEIGHT: 67 IN | DIASTOLIC BLOOD PRESSURE: 81 MMHG | BODY MASS INDEX: 32.96 KG/M2 | HEART RATE: 80 BPM | SYSTOLIC BLOOD PRESSURE: 147 MMHG

## 2021-08-18 DIAGNOSIS — Z98.890 POST-OPERATIVE STATE: Primary | ICD-10-CM

## 2021-08-18 PROCEDURE — 99024 POSTOP FOLLOW-UP VISIT: CPT | Performed by: PHYSICIAN ASSISTANT

## 2021-08-18 NOTE — PATIENT INSTRUCTIONS
Your recovering well  Continue to follow lifting and activity restrictions as discussed  May return to work without restrictions as per note  Please obtain work paperwork and fax to office for completion  No further follow-up required  Continue to call with any questions or concerns

## 2021-08-18 NOTE — PROGRESS NOTES
Assessment/Plan:   Pippa France is a 73 y  o male who comes in today for postoperative check after open repair of umbilical hernia done on 08/05/2021  Defect was small  No mesh was required  Patient has recovered well  He denies any pain  No bulging at site  He is eating well and having regular bowel movements  He is not using any pain medication  He is following lifting activity restrictions  He has some concerns about returning to work as he does an extremely physical job with heavy lifting  on exam patient's abdomen is benign  Hernia repair is intact  Incision is healing well  Pathology:Umbilicus, hernia repair:  - Mesothelium lined fibroadipose tissue consistent with hernia sac  At this point patient is recovering well  Will extend his return to work date as reviewed with patient  He will obtain paperwork from  to be completed  At this point he does not require further surgical follow-up  He should continue to refrain from any strenuous activity and lift nothing greater than 20 lb until 09/05/2021  All questions answered  Patient should continue to call with any questions or concerns  HPI:  Pippa France is a 73 y  o male who comes in today for postoperative check after recent  open repair of umbilical hernia as above    Currently doing well without problems, no fever or chills,no nausea and no vomiting  Reports  He is having bowel movements  He denies any incisional pain  He is following lifting activity restrictions  He is concerned about returning to work due to his physical activity and heavy lifting       ROS:  General ROS: negative for - chills, fatigue, fever or night sweats, weight loss  Respiratory ROS: no cough, shortness of breath, or wheezing  Cardiovascular ROS: no chest pain or dyspnea on exertion  Abdomen ROS: as per HPI  Genito-Urinary ROS: no dysuria, trouble voiding, or hematuria  Musculoskeletal ROS: negative for - gait disturbance, joint pain or muscle pain  Neurological ROS: no TIA or stroke symptoms  Skin ROS: surgical incision    ALLERGIES  Naproxen    Current Outpatient Medications:     amLODIPine (NORVASC) 10 mg tablet, Take 1 tablet (10 mg total) by mouth daily, Disp: 90 tablet, Rfl: 3    aspirin (ECOTRIN LOW STRENGTH) 81 mg EC tablet, Take 1 tablet by mouth daily, Disp: , Rfl:     Cholecalciferol (VITAMIN D) 2000 units CAPS, Take 1 capsule by mouth daily, Disp: , Rfl:     glimepiride (AMARYL) 2 mg tablet, Take 1 tablet (2 mg total) by mouth daily, Disp: 90 tablet, Rfl: 3    glucose blood (FREESTYLE LITE) test strip, Test blood sugar once daily, Disp: 100 each, Rfl: 3    Lancets (FREESTYLE) lancets, by Does not apply route daily, Disp: , Rfl:     lisinopril-hydrochlorothiazide (PRINZIDE,ZESTORETIC) 20-25 MG per tablet, Take 1 tablet by mouth daily, Disp: 90 tablet, Rfl: 3    meloxicam (MOBIC) 15 mg tablet, Take 1 tablet (15 mg total) by mouth daily, Disp: 90 tablet, Rfl: 3    metFORMIN (GLUCOPHAGE) 500 mg tablet, Take 2 tablets (1,000 mg total) by mouth 2 (two) times a day, Disp: 360 tablet, Rfl: 3    pravastatin (PRAVACHOL) 80 mg tablet, Take 1 tablet (80 mg total) by mouth daily, Disp: 90 tablet, Rfl: 3  Past Medical History:   Diagnosis Date    Arthritis     Diabetes mellitus ([address] )     Hyperlipidemia     Hypertension     Kidney stone     Umbilical hernia     Wears glasses      Past Surgical History:   Procedure Laterality Date    COLONOSCOPY      FRACTURE SURGERY      right hand and wrist    JOINT REPLACEMENT      b/l knee     [place] FRAGMENT KIDNEY STONE/ ESWL Left 8/22/2019    Procedure: ESWL OF RENAL CALCULUS;  Surgeon: Neva Boone DO;  Location: [address] [place] [address] MAIN OR;  Service: Urology    [place] REPAIR UMBILICAL WXAN,6+E/I,BVLUT N/A 8/5/2021    Procedure: REPAIR HERNIA UMBILICAL;  Surgeon: Sindy Yeung MD;  Location: [facility];  Service: General     Family History   Problem Relation Age of Onset    Diabetes Mother     Prostate cancer Father     Other Maternal Uncle         CABG      reports that he has never smoked  He has never used smokeless tobacco  He reports current alcohol use  He reports that he does not use drugs      PHYSICAL EXAM    Vitals:    08/18/21 1355   BP: 147/81   Pulse: 80       General: normal, cooperative, no distress  Abdominal: soft, nondistended or nontender    Hernia repair is intact  Incision: clean, dry, and intact and healing well    Silvestre Berry PA-C

## 2021-08-18 NOTE — LETTER
August 18, 2021     Patient: Olinda Márquez   YOB: 1955   Date of Visit: 8/18/2021       To Whom it May Concern:    Arnol Nam is under my professional care  He was seen in my office on 8/18/2021  He may not return to work without restrictions until 9/20/21  If you have any questions or concerns, please don't hesitate to call           Sincerely,          Bishop Berry PA-C        CC: No Recipients

## 2021-08-19 ENCOUNTER — OFFICE VISIT (OUTPATIENT)
Dept: GASTROENTEROLOGY | Facility: MEDICAL CENTER | Age: 66
End: 2021-08-19
Payer: COMMERCIAL

## 2021-08-19 VITALS
BODY MASS INDEX: 34.21 KG/M2 | DIASTOLIC BLOOD PRESSURE: 82 MMHG | HEART RATE: 87 BPM | WEIGHT: 218 LBS | HEIGHT: 67 IN | TEMPERATURE: 98.2 F | SYSTOLIC BLOOD PRESSURE: 132 MMHG

## 2021-08-19 DIAGNOSIS — Z12.11 COLON CANCER SCREENING: Primary | ICD-10-CM

## 2021-08-19 PROCEDURE — 1036F TOBACCO NON-USER: CPT | Performed by: INTERNAL MEDICINE

## 2021-08-19 PROCEDURE — 99203 OFFICE O/P NEW LOW 30 MIN: CPT | Performed by: INTERNAL MEDICINE

## 2021-08-19 PROCEDURE — 3008F BODY MASS INDEX DOCD: CPT | Performed by: INTERNAL MEDICINE

## 2021-08-19 RX ORDER — AMOXICILLIN 500 MG/1
CAPSULE ORAL
COMMUNITY

## 2021-08-19 RX ORDER — TADALAFIL 5 MG/1
5 TABLET ORAL DAILY
COMMUNITY
Start: 2021-07-10

## 2021-08-19 NOTE — PATIENT INSTRUCTIONS
The patient is scheduled at [facility] for a Colon with Dr Anibal Al on 9/20/2021  Miralax/dulcolax prep instructions have been gone over in the office, with the patient, by the MA  The patient is aware that they will receive a call with the arrival time the day prior to procedure and that they will need a  the day of the procedure   I have asked the patient to call with any questions that they might have prior to procedure

## 2021-08-19 NOTE — PROGRESS NOTES
[facility] - Outpatient Consultation  Moy Abdi 73 y o  male MRN: [id number]  Encounter: [id number]          ASSESSMENT AND PLAN:  60-year-old male with history diabetes and hypertension presents for evaluation    1  Colon cancer screening  His 1st and only colonoscopy was 10 years ago and was normal   He is currently without GI complaints  I discussed the indication, risk and benefit of colonoscopy for colon cancer screening with him today  Informed consent was obtained for the procedure  Risks of infection, perforation and hemorrhage were discussed  The patient was agreeable to proceed with the procedure  - Colonoscopy; Future      ______________________________________________________________________    HPI:  Moy Abdi is a 73 y o  male with a history of diabetes and hypertension presents for evaluation  He reports undergoing his 1st colonoscopy 10 years ago which was normal   He is currently without GI complaints and denies abdominal pain, nausea or vomiting  His appetite is good his weight is stable  He has no family history of colorectal cancer  His anti-platelet use includes aspirin 81 mg       a1c 7/21 6 6          REVIEW OF SYSTEMS:    CONSTITUTIONAL: Denies any fever, chills, rigors, and weight loss  HEENT: No earache or tinnitus  Denies hearing loss or visual disturbances  CARDIOVASCULAR: No chest pain or palpitations  RESPIRATORY: Denies any cough, hemoptysis, shortness of breath or dyspnea on exertion  GASTROINTESTINAL: As noted in the History of Present Illness  GENITOURINARY: No problems with urination  Denies any hematuria or dysuria  NEUROLOGIC: No dizziness or vertigo, denies headaches  MUSCULOSKELETAL: Denies any muscle or joint pain  SKIN: Denies skin rashes or itching  ENDOCRINE: Denies excessive thirst  Denies intolerance to heat or cold  PSYCHOSOCIAL: Denies depression or anxiety  Denies any recent memory loss         Historical Information   Past Medical History:   Diagnosis Date    Arthritis     Diabetes mellitus ([address] )     Hyperlipidemia     Hypertension     Kidney stone     Umbilical hernia     Wears glasses      Past Surgical History:   Procedure Laterality Date    COLONOSCOPY      FRACTURE SURGERY      right hand and wrist    JOINT REPLACEMENT      b/l knee     [place] FRAGMENT KIDNEY STONE/ ESWL Left 8/22/2019    Procedure: ESWL OF RENAL CALCULUS;  Surgeon: Yen Steve DO;  Location: [address] [place] [address] MAIN OR;  Service: Urology    [place] REPAIR UMBILICAL LYSB,6+K/Y,SNIIJ N/A 8/5/2021    Procedure: REPAIR HERNIA UMBILICAL;  Surgeon: Kacey Garza MD;  Location[facility];  Service: General     Social History   Social History     Substance and Sexual Activity   Alcohol Use Yes    Comment: rare     Social History     Substance and Sexual Activity   Drug Use No     Social History     Tobacco Use   Smoking Status Never Smoker   Smokeless Tobacco Never Used     Family History   Problem Relation Age of Onset    Diabetes Mother     Prostate cancer Father     Other Maternal Uncle         CABG       Meds/Allergies       Current Outpatient Medications:     amLODIPine (NORVASC) 10 mg tablet    aspirin (ECOTRIN LOW STRENGTH) 81 mg EC tablet    Cholecalciferol (VITAMIN D) 2000 units CAPS    glimepiride (AMARYL) 2 mg tablet    lisinopril-hydrochlorothiazide (PRINZIDE,ZESTORETIC) 20-25 MG per tablet    meloxicam (MOBIC) 15 mg tablet    metFORMIN (GLUCOPHAGE) 500 mg tablet    pravastatin (PRAVACHOL) 80 mg tablet    tadalafil (CIALIS) 5 MG tablet    amoxicillin (AMOXIL) 500 mg capsule    glucose blood (FREESTYLE LITE) test strip    Lancets (FREESTYLE) lancets    mupirocin (BACTROBAN) 2 % ointment    Allergies   Allergen Reactions    Naproxen Nausea Only           Objective     Blood pressure 132/82, pulse 87, temperature 98 2 °F (36 8 °C), height 5' 7" (1 702 m), weight 98 9 kg (218 lb)  Body mass index is 34 14 kg/m²          PHYSICAL EXAM: General Appearance:   Alert, cooperative, no distress   HEENT:   Normocephalic, atraumatic, anicteric  Neck:  Supple, symmetrical, trachea midline   Lungs:   Clear to auscultation bilaterally; no rales, rhonchi or wheezing; respirations unlabored    Heart[de-identified]   Regular rate and rhythm; no murmur, rub, or gallop  Abdomen:   Soft, non-tender, non-distended; normal bowel sounds; no masses, no organomegaly    Genitalia:   Deferred    Rectal:   Deferred    Extremities:  No cyanosis, clubbing or edema    Pulses:  2+ and symmetric    Skin:  No jaundice, rashes, or lesions    Lymph nodes:  No palpable cervical lymphadenopathy        Lab Results:   No visits with results within 1 Day(s) from this visit  Latest known visit with results is:   Admission on 08/05/2021, Discharged on 08/05/2021   Component Date Value    Case Report 08/05/2021                      Value:Surgical Pathology Report                         Case: [id number]                                   Authorizing Provider:  Belem Jovel MD         Collected:           08/05/2021 1431              Ordering Location:     [facility]        Received:            08/05/2021 [address] [place] [address] Operating Room                                                     Pathologist:           Kevin Resendez MD                                                        Specimen:    Hernia Sac, Umbilical, UMBILLICAL HERNIA [facility]                                               Final Diagnosis 08/05/2021                      Value: This result contains rich text formatting which cannot be displayed here   Additional Information 08/05/2021                      Value: This result contains rich text formatting which cannot be displayed here  Margie Silence Gross Description 08/05/2021                      Value: This result contains rich text formatting which cannot be displayed here      POC Glucose 08/05/2021 157*    POC Glucose 08/05/2021 183* Radiology Results:   No results found

## 2021-09-17 ENCOUNTER — TELEPHONE (OUTPATIENT)
Dept: GASTROENTEROLOGY | Facility: MEDICAL CENTER | Age: 66
End: 2021-09-17

## 2021-09-19 ENCOUNTER — ANESTHESIA (OUTPATIENT)
Dept: ANESTHESIOLOGY | Facility: HOSPITAL | Age: 66
End: 2021-09-19

## 2021-09-19 ENCOUNTER — ANESTHESIA EVENT (OUTPATIENT)
Dept: ANESTHESIOLOGY | Facility: HOSPITAL | Age: 66
End: 2021-09-19

## 2021-09-19 NOTE — ANESTHESIA PREPROCEDURE EVALUATION
Procedure:  PRE-OP ONLY    Relevant Problems   ANESTHESIA (within normal limits)      CARDIO   (+) Essential hypertension   (+) Hyperlipidemia      ENDO (within normal limits)      GI/HEPATIC (within normal limits)      /RENAL (within normal limits)      GYN (within normal limits)      HEMATOLOGY (within normal limits)      MUSCULOSKELETAL   (+) Osteoarthritis   (+) Osteoarthritis of knee   (+) Osteoarthrosis of knee      NEURO/PSYCH   (+) History of fracture      PULMONARY (within normal limits)             Anesthesia Plan  ASA Score- 2     Anesthesia Type- IV sedation with anesthesia with ASA Monitors  Additional Monitors:   Airway Plan:           Plan Factors-Exercise tolerance (METS): >4 METS  Chart reviewed  Patient summary reviewed  Patient is not a current smoker  Patient instructed to abstain from smoking on day of procedure  Patient did not smoke on day of surgery  Induction- intravenous  Postoperative Plan-     Informed Consent- Anesthetic plan and risks discussed with patient

## 2021-09-20 ENCOUNTER — ANESTHESIA EVENT (OUTPATIENT)
Dept: GASTROENTEROLOGY | Facility: MEDICAL CENTER | Age: 66
End: 2021-09-20

## 2021-09-20 ENCOUNTER — ANESTHESIA (OUTPATIENT)
Dept: GASTROENTEROLOGY | Facility: MEDICAL CENTER | Age: 66
End: 2021-09-20

## 2021-09-20 ENCOUNTER — HOSPITAL ENCOUNTER (OUTPATIENT)
Dept: GASTROENTEROLOGY | Facility: MEDICAL CENTER | Age: 66
Setting detail: OUTPATIENT SURGERY
Discharge: HOME | End: 2021-09-20
Payer: COMMERCIAL

## 2021-09-20 VITALS
RESPIRATION RATE: 16 BRPM | SYSTOLIC BLOOD PRESSURE: 143 MMHG | HEART RATE: 72 BPM | DIASTOLIC BLOOD PRESSURE: 82 MMHG | TEMPERATURE: 98.7 F | HEIGHT: 67 IN | OXYGEN SATURATION: 95 % | BODY MASS INDEX: 33.9 KG/M2 | WEIGHT: 216 LBS

## 2021-09-20 DIAGNOSIS — Z12.11 COLON CANCER SCREENING: ICD-10-CM

## 2021-09-20 PROCEDURE — 88305 TISSUE EXAM BY PATHOLOGIST: CPT | Performed by: PATHOLOGY

## 2021-09-20 PROCEDURE — 45385 COLONOSCOPY W/LESION REMOVAL: CPT | Performed by: INTERNAL MEDICINE

## 2021-09-20 RX ORDER — SODIUM CHLORIDE 9 MG/ML
125 INJECTION, SOLUTION INTRAVENOUS CONTINUOUS
Status: DISCONTINUED | OUTPATIENT
Start: 2021-09-20 | End: 2021-09-24 | Stop reason: HOSPADM

## 2021-09-20 RX ORDER — PROPOFOL 10 MG/ML
INJECTION, EMULSION INTRAVENOUS AS NEEDED
Status: DISCONTINUED | OUTPATIENT
Start: 2021-09-20 | End: 2021-09-20

## 2021-09-20 RX ORDER — LIDOCAINE HYDROCHLORIDE 20 MG/ML
INJECTION, SOLUTION EPIDURAL; INFILTRATION; INTRACAUDAL; PERINEURAL AS NEEDED
Status: DISCONTINUED | OUTPATIENT
Start: 2021-09-20 | End: 2021-09-20

## 2021-09-20 RX ADMIN — PROPOFOL 50 MG: 10 INJECTION, EMULSION INTRAVENOUS at 10:54

## 2021-09-20 RX ADMIN — PROPOFOL 50 MG: 10 INJECTION, EMULSION INTRAVENOUS at 10:49

## 2021-09-20 RX ADMIN — LIDOCAINE HYDROCHLORIDE 100 MG: 20 INJECTION, SOLUTION EPIDURAL; INFILTRATION; INTRACAUDAL; PERINEURAL at 10:41

## 2021-09-20 RX ADMIN — SODIUM CHLORIDE 125 ML/HR: 0.9 INJECTION, SOLUTION INTRAVENOUS at 09:55

## 2021-09-20 RX ADMIN — PROPOFOL 50 MG: 10 INJECTION, EMULSION INTRAVENOUS at 10:45

## 2021-09-20 RX ADMIN — PROPOFOL 100 MG: 10 INJECTION, EMULSION INTRAVENOUS at 10:41

## 2021-09-20 NOTE — H&P
H&P EXAM - Outpatient Endoscopy   Alton Coughlin 73 y o  male MRN: [id number]    [address] ENDOSCOPY   Encounter: [id number]      History and Physical - [facility]  Alton Coughlin 73 y o  male MRN: [id number]                  HPI: Alton Coughlin is a 73y o  year old male who presents for colon cancer screening      REVIEW OF SYSTEMS: Per the HPI, and otherwise unremarkable  Historical Information   Past Medical History:   Diagnosis Date    Arthritis     Diabetes mellitus ([address] )     Hyperlipidemia     Hypertension     Kidney stone     Umbilical hernia     Wears glasses      Past Surgical History:   Procedure Laterality Date    COLONOSCOPY      FRACTURE SURGERY      right hand and wrist    HERNIA REPAIR      JOINT REPLACEMENT      b/l knee     [place] FRAGMENT KIDNEY STONE/ ESWL Left 8/22/2019    Procedure: ESWL OF RENAL CALCULUS;  Surgeon: Dick Lozano DO;  Location: [address] [place] [address] MAIN OR;  Service: Urology    [facility] [address] ZMPQ,0+C/O,WHUKQ N/A 8/5/2021    Procedure: REPAIR HERNIA UMBILICAL;  Surgeon: Carmela Segura MD;  Location: [facility];  Service: General     Social History   Social History     Substance and Sexual Activity   Alcohol Use Yes    Comment: rare     Social History     Substance and Sexual Activity   Drug Use No     Social History     Tobacco Use   Smoking Status Never Smoker   Smokeless Tobacco Never Used     Family History   Problem Relation Age of Onset    Diabetes Mother     Prostate cancer Father     Other Maternal Uncle         CABG       Meds/Allergies     (Not in a hospital admission)      Allergies   Allergen Reactions    Naproxen Nausea Only       Objective     BP (!) 174/90   Pulse 98   Temp 98 7 °F (37 1 °C) (Temporal)   Resp 20   Ht 5' 7" (1 702 m)   Wt 98 kg (216 lb)   SpO2 97%   BMI 33 83 kg/m²       PHYSICAL EXAM    Gen: NAD  CV: RRR  CHEST: Clear  ABD: soft, NT/ND  EXT: no edema      ASSESSMENT/PLAN:  This is a 73 y o  year old male here for colonoscopy, and he is stable and optimized for his procedure

## 2021-09-20 NOTE — ANESTHESIA PREPROCEDURE EVALUATION
Procedure:  COLONOSCOPY    Relevant Problems   ANESTHESIA (within normal limits)      CARDIO   (+) Essential hypertension   (+) Hyperlipidemia      ENDO (within normal limits)      GI/HEPATIC (within normal limits)      /RENAL (within normal limits)      GYN (within normal limits)      HEMATOLOGY (within normal limits)      MUSCULOSKELETAL   (+) Osteoarthritis   (+) Osteoarthritis of knee   (+) Osteoarthrosis of knee      NEURO/PSYCH   (+) History of fracture      PULMONARY (within normal limits)      Other   (+) DM type 2, not at goal [facility])        Physical Exam    Airway    Mallampati score: III  TM Distance: >3 FB  Neck ROM: full     Dental   No notable dental hx     Cardiovascular  Rhythm: regular, Rate: normal, Cardiovascular exam normal    Pulmonary  Pulmonary exam normal Breath sounds clear to auscultation,     Other Findings        Anesthesia Plan  ASA Score- 2     Anesthesia Type- IV sedation with anesthesia with ASA Monitors  Additional Monitors:   Airway Plan:           Plan Factors-Exercise tolerance (METS): >4 METS  Chart reviewed  Patient summary reviewed  Patient is not a current smoker  Patient instructed to abstain from smoking on day of procedure  Patient did not smoke on day of surgery  Induction- intravenous  Postoperative Plan-     Informed Consent- Anesthetic plan and risks discussed with patient

## 2021-09-20 NOTE — DISCHARGE INSTRUCTIONS
Colonoscopy   WHAT YOU NEED TO KNOW:   A colonoscopy is a procedure to examine the inside of your colon (intestine) with a scope  Polyps or tissue growths may have been removed during your colonoscopy  It is normal to feel bloated and to have some abdominal discomfort  You should be passing gas  If you have hemorrhoids or you had polyps removed, you may have a small amount of bleeding  DISCHARGE INSTRUCTIONS:   Seek care immediately if:    You have sudden, severe abdominal pain   You have problems swallowing   You have a large amount of black, sticky bowel movements or blood in your bowel movements   You have sudden trouble breathing   You feel weak, lightheaded, or faint or your heart beats faster than normal for you  Contact your healthcare provider if:    You have a fever and chills   You have nausea or are vomiting   Your abdomen is bloated or feels full and hard   You have abdominal pain   You have black, sticky bowel movements or blood in your bowel movements   You have not had a bowel movement for 3 days after your procedure   You have rash or hives   You have questions or concerns about your procedure  Activity:    Do not lift, strain, or run for 24 hours after your procedure   Rest after your procedure  You have been given medicine to relax you  Do not drive or make important decisions until the day after your procedure  Return to your normal activity as directed   Relieve gas and discomfort from bloating by lying on your right side with a heating pad on your abdomen  You may need to take short walks to help the gas move out  Eat small meals until bloating is relieved  Follow up with your healthcare provider as directed: Write down your questions so you remember to ask them during your visits  If you take a blood thinner, please review the specific instructions from your endoscopist about when you should resume it   These can be found in the Recommendation and Your Medication list sections of this After Visit Summary  Colorectal Polyps   WHAT YOU NEED TO KNOW:   Colorectal polyps are small growths of tissue in the lining of the colon and rectum  Most polyps are hyperplastic polyps and are usually benign (noncancerous)  Certain types of polyps, called adenomatous polyps, may turn into cancer  DISCHARGE INSTRUCTIONS:   Follow up with your healthcare provider or gastroenterologist as directed: You may need to return for more tests, such as another colonoscopy  Write down your questions so you remember to ask them during your visits  Reduce your risk for colorectal polyps:   · Eat a variety of healthy foods:  Healthy foods include fruit, vegetables, whole-grain breads, low-fat dairy products, beans, lean meat, and fish  Ask if you need to be on a special diet  · Maintain a healthy weight:  Ask your healthcare provider if you need to lose weight and how much you need to lose  Ask for help with a weight loss program     · Exercise:  Begin to exercise slowly and do more as you get stronger  Talk with your healthcare provider before you start an exercise program      · Limit alcohol:  Your risk for polyps increases the more you drink  · Do not smoke: If you smoke, it is never too late to quit  Ask for information about how to stop  For support and more information:   · [address] ([facility]) [address], [place] [address]  Phone: [phone]  Web Address: www digestive  niddk nih gov    Contact your healthcare provider or gastroenterologist if:   · You have a fever  · You have chills, a cough, or feel weak and achy  · You have abdominal pain that does not go away or gets worse after you take medicine  · Your abdomen is swollen  · You are losing weight without trying  · You have questions or concerns about your condition or care      Seek care immediately or call 911 if:   · You have sudden shortness of breath  · You have a fast heart rate, fast breathing, or are too dizzy to stand up  · You have severe abdominal pain  · You see blood in your bowel movement  © Copyright [address] Information is for End User's use only and may not be sold, redistributed or otherwise used for commercial purposes  All illustrations and images included in CareNotes® are the copyrighted property of A D A M , Inc  or [facility] Maday Gunn   The above information is an  only  It is not intended as medical advice for individual conditions or treatments  Talk to your doctor, nurse or pharmacist before following any medical regimen to see if it is safe and effective for you  Diverticulosis   WHAT YOU NEED TO KNOW:   Diverticulosis is a condition that causes small pockets called diverticula to form in your intestine  These pockets make it difficult for bowel movements to pass through your digestive system  DISCHARGE INSTRUCTIONS:   Seek care immediately if:   · You have severe pain on the left side of your lower abdomen  · Your bowel movements are bright or dark red  Contact your healthcare provider if:   · You have a fever and chills  · You feel dizzy or lightheaded  · You have nausea, or you are vomiting  · You have a change in your bowel movements  · You have questions or concerns about your condition or care  Medicines:   · Medicines  to soften your bowel movements may be given  You may also need medicines to treat symptoms such as bloating and pain  · Take your medicine as directed  Contact your healthcare provider if you think your medicine is not helping or if you have side effects  Tell him or her if you are allergic to any medicine  Keep a list of the medicines, vitamins, and herbs you take  Include the amounts, and when and why you take them  Bring the list or the pill bottles to follow-up visits  Carry your medicine list with you in case of an emergency  Self-care: The goal of treatment is to manage any symptoms you have and prevent other problems such as diverticulitis  Diverticulitis is swelling or infection of the diverticula  Your healthcare provider may recommend any of the following:  · Eat a variety of high-fiber foods  High-fiber foods help you have regular bowel movements  High-fiber foods include cooked beans, fruits, vegetables, and some cereals  Most adults need 25 to 35 grams of fiber each day  Your healthcare provider may recommend that you have more  Ask your healthcare provider how much fiber you need  Increase fiber slowly  You may have abdominal discomfort, bloating, and gas if you add fiber to your diet too quickly  You may need to take a fiber supplement if you are not getting enough fiber from food  · Drink liquids as directed  You may need to drink 2 to 3 liters (8 to 12 cups) of liquids every day  Ask your healthcare provider how much liquid to drink each day and which liquids are best for you  · Apply heat  on your abdomen for 20 to 30 minutes every 2 hours for as many days as directed  Heat helps decrease pain and muscle spasms  Help prevent diverticulitis or other symptoms: The following may help decrease your risk for diverticulitis or symptoms, such as bleeding  Talk to your provider about these or other things you can do to prevent problems that may occur with diverticulosis  · Exercise regularly  Ask your healthcare provider about the best exercise plan for you  Exercise can help you have regular bowel movements  Get 30 minutes of exercise on most days of the week  · Maintain a healthy weight  Ask your healthcare provider how much you should weigh  Ask him or her to help you create a weight loss plan if you are overweight  · Do not smoke  Nicotine and other chemicals in cigarettes increase your risk for diverticulitis   Ask your healthcare provider for information if you currently smoke and need help to quit  E-cigarettes or smokeless tobacco still contain nicotine  Talk to your healthcare provider before you use these products  · Ask your healthcare provider if it is safe to take NSAIDs  NSAIDs may increase your risk of diverticulitis  Follow up with your healthcare provider as directed:  Write down your questions so you remember to ask them during your visits  © Copyright [employer] 2021 Information is for End User's use only and may not be sold, redistributed or otherwise used for commercial purposes  All illustrations and images included in CareNotes® are the copyrighted property of A D A M , Inc  or [facility] [employer]ay [employer]   The above information is an  only  It is not intended as medical advice for individual conditions or treatments  Talk to your doctor, nurse or pharmacist before following any medical regimen to see if it is safe and effective for you  Hemorrhoids   WHAT YOU NEED TO KNOW:   Hemorrhoids are swollen blood vessels inside your rectum (internal hemorrhoids) or on your anus (external hemorrhoids)  Sometimes a hemorrhoid may prolapse  This means it extends out of your anus  DISCHARGE INSTRUCTIONS:   Seek care immediately if:   · You have severe pain in your rectum or around your anus  · You have severe pain in your abdomen and you are vomiting  · You have bleeding from your anus that soaks through your underwear  Contact your healthcare provider if:   · You have frequent and painful bowel movements  · Your hemorrhoid looks or feels more swollen than usual      · You do not have a bowel movement for 2 days or more  · You see or feel tissue coming through your anus  · You have questions or concerns about your condition or care  Medicines: You may  need any of the following:  · Medicine  may be given to decrease pain, swelling, and itching   The medicine may come as a pad, cream, or ointment  · Stool softeners  help treat or prevent constipation  · NSAIDs , such as ibuprofen, help decrease swelling, pain, and fever  NSAIDs can cause stomach bleeding or kidney problems in certain people  If you take blood thinner medicine, always ask your healthcare provider if NSAIDs are safe for you  Always read the medicine label and follow directions  · Take your medicine as directed  Contact your healthcare provider if you think your medicine is not helping or if you have side effects  Tell him or her if you are allergic to any medicine  Keep a list of the medicines, vitamins, and herbs you take  Include the amounts, and when and why you take them  Bring the list or the pill bottles to follow-up visits  Carry your medicine list with you in case of an emergency  Manage your symptoms:   · Apply ice on your anus for 15 to 20 minutes every hour or as directed  Use an ice pack, or put crushed ice in a plastic bag  Cover it with a towel before you apply it to your anus  Ice helps prevent tissue damage and decreases swelling and pain  · Take a sitz bath  Fill a bathtub with 4 to 6 inches of warm water  You may also use a sitz bath pan that fits inside a toilet bowl  Sit in the sitz bath for 15 minutes  Do this 3 times a day, and after each bowel movement  The warm water can help decrease pain and swelling  · Keep your anal area clean  Gently wash the area with warm water daily  Soap may irritate the area  After a bowel movement, wipe with moist towelettes or wet toilet paper  Dry toilet paper can irritate the area  Prevent hemorrhoids:   · Do not strain to have a bowel movement  Do not sit on the toilet too long  These actions can increase pressure on the tissues in your rectum and anus  · Drink plenty of liquids  Liquids can help prevent constipation  Ask how much liquid to drink each day and which liquids are best for you  · Eat a variety of high-fiber foods    Examples include fruits, vegetables, and whole grains  Ask your healthcare provider how much fiber you need each day  You may need to take a fiber supplement  · Exercise as directed  Exercise, such as walking, may make it easier to have a bowel movement  Ask your healthcare provider to help you create an exercise plan  · Do not have anal sex  Anal sex can weaken the skin around your rectum and anus  · Avoid heavy lifting  This can cause straining and increase your risk for another hemorrhoid  Follow up with your healthcare provider as directed:  Write down your questions so you remember to ask them during your visits  © Copyright [employer] 2021 Information is for End User's use only and may not be sold, redistributed or otherwise used for commercial purposes  All illustrations and images included in CareNotes® are the copyrighted property of [employer]  or [facility] Maday Gunn   The above information is an  only  It is not intended as medical advice for individual conditions or treatments  Talk to your doctor, nurse or pharmacist before following any medical regimen to see if it is safe and effective for you

## 2021-11-23 ENCOUNTER — NURSE TRIAGE (OUTPATIENT)
Dept: OTHER | Facility: OTHER | Age: 66
End: 2021-11-23

## 2021-11-23 DIAGNOSIS — Z20.822 ENCOUNTER FOR SCREENING LABORATORY TESTING FOR COVID-19 VIRUS: Primary | ICD-10-CM

## 2021-11-23 PROCEDURE — U0005 INFEC AGEN DETEC AMPLI PROBE: HCPCS | Performed by: FAMILY MEDICINE

## 2021-11-23 PROCEDURE — U0003 INFECTIOUS AGENT DETECTION BY NUCLEIC ACID (DNA OR RNA); SEVERE ACUTE RESPIRATORY SYNDROME CORONAVIRUS 2 (SARS-COV-2) (CORONAVIRUS DISEASE [COVID-19]), AMPLIFIED PROBE TECHNIQUE, MAKING USE OF HIGH THROUGHPUT TECHNOLOGIES AS DESCRIBED BY CMS-2020-01-R: HCPCS | Performed by: FAMILY MEDICINE

## 2021-11-26 ENCOUNTER — TELEMEDICINE (OUTPATIENT)
Dept: FAMILY MEDICINE CLINIC | Facility: CLINIC | Age: 66
End: 2021-11-26
Payer: COMMERCIAL

## 2021-11-26 DIAGNOSIS — U07.1 COVID-19: Primary | ICD-10-CM

## 2021-11-26 PROCEDURE — 99441 PR PHYS/QHP TELEPHONE EVALUATION 5-10 MIN: CPT | Performed by: FAMILY MEDICINE

## 2021-11-30 ENCOUNTER — TELEPHONE (OUTPATIENT)
Dept: OTHER | Facility: OTHER | Age: 66
End: 2021-11-30

## 2021-12-01 ENCOUNTER — NURSE TRIAGE (OUTPATIENT)
Dept: OTHER | Facility: OTHER | Age: 66
End: 2021-12-01

## 2021-12-01 ENCOUNTER — TELEMEDICINE (OUTPATIENT)
Dept: FAMILY MEDICINE CLINIC | Facility: CLINIC | Age: 66
End: 2021-12-01
Payer: COMMERCIAL

## 2021-12-01 DIAGNOSIS — U07.1 COVID-19: Primary | ICD-10-CM

## 2021-12-01 PROCEDURE — 99442 PR PHYS/QHP TELEPHONE EVALUATION 11-20 MIN: CPT | Performed by: FAMILY MEDICINE

## 2021-12-01 RX ORDER — BROMPHENIRAMINE MALEATE, PSEUDOEPHEDRINE HYDROCHLORIDE, AND DEXTROMETHORPHAN HYDROBROMIDE 2; 30; 10 MG/5ML; MG/5ML; MG/5ML
5 SYRUP ORAL 4 TIMES DAILY PRN
Qty: 120 ML | Refills: 0 | Status: SHIPPED | OUTPATIENT
Start: 2021-12-01

## 2021-12-31 DIAGNOSIS — E11.9 TYPE 2 DIABETES MELLITUS WITHOUT COMPLICATION, WITHOUT LONG-TERM CURRENT USE OF INSULIN (HCC): ICD-10-CM

## 2022-01-01 RX ORDER — BLOOD-GLUCOSE METER
KIT MISCELLANEOUS
Qty: 1 EACH | Refills: 0 | Status: SHIPPED | OUTPATIENT
Start: 2022-01-01

## 2022-09-07 NOTE — PROGRESS NOTES
Assessment/Plan:  Patient given lab requisition for fasting labs as below  Patient to continue present treatment  Patient instructed to follow a low-fat, low-salt and a low sugar/carbohydrate diet more carefully and get regular aerobic exercise walking 150 minutes per week  Weight loss encouraged  Patient instructed to schedule diabetic eye exam   Patient provided with excuse for jury duty  Return the office in 6 months  Discussed importance of compliance with labs, medications and follow-up appointments  DM type 2, not at goal [facility])  Diabetes is uncontrolled with fingerstick hemoglobin A1c at 8 4 today  Patient to continue present treatment and follow a low sugar and low-carbohydrate diet more carefully  Continue home glucose monitoring  Lab Results   Component Value Date    HGBA1C 8 4 (A) 09/08/2022        Diagnoses and all orders for this visit:    Type 2 diabetes mellitus without complication, without long-term current use of insulin (HCC)  -     POCT hemoglobin A1c  -     Comprehensive metabolic panel; Future  -     Microalbumin / creatinine urine ratio; Future  -     metFORMIN (GLUCOPHAGE) 500 mg tablet; Take 2 tablets (1,000 mg total) by mouth 2 (two) times a day  -     glimepiride (AMARYL) 2 mg tablet; Take 1 tablet (2 mg total) by mouth daily  -     glucose blood (FREESTYLE LITE) test strip; USE  TO TEST BLOOD SUGAR ONCE A DAY    DM type 2, not at goal [facility])    Essential hypertension  -     CBC and Platelet; Future  -     Comprehensive metabolic panel; Future  -     TSH, 3rd generation with Free T4 reflex; Future    Hyperlipidemia, unspecified hyperlipidemia type  -     Comprehensive metabolic panel; Future  -     Lipid panel; Future  -     pravastatin (PRAVACHOL) 80 mg tablet; Take 1 tablet (80 mg total) by mouth daily    Primary osteoarthritis involving multiple joints  -     meloxicam (MOBIC) 15 mg tablet;  Take 1 tablet (15 mg total) by mouth daily    Class 1 obesity due to excess calories with serious comorbidity and body mass index (BMI) of 33 0 to 33 9 in adult    Enlarged prostate without lower urinary tract symptoms (luts)    Vitamin D deficiency  -     Vitamin D 25 hydroxy; Future    Screening PSA (prostate specific antigen)  -     PSA, Total Screen; Future    Essential hypertension, benign  -     lisinopril-hydrochlorothiazide (PRINZIDE,ZESTORETIC) 20-25 MG per tablet; Take 1 tablet by mouth daily  -     amLODIPine (NORVASC) 10 mg tablet; Take 1 tablet (10 mg total) by mouth daily          Subjective:      Patient ID: Lina Uribe is a 78 y o  male  Patient is here for follow-up appoint for chronic conditions as he has not been seen here in over a year  Patient requests note to be excused from jury duty  He complains of chronic low back pain and increased frequency urination  Patient retired 3 months ago  Patient's wife recently  and patient was not following his diet carefully or taking care of himself until recently has started to do a better job with following his diet and occasional walks  Patient is due for diabetic eye exam and encouraged to schedule with Dr Venkat Ceron  Patient refuses diabetic foot exam today  Diabetes  He presents for his follow-up diabetic visit  He has type 2 diabetes mellitus  His disease course has been worsening  There are no hypoglycemic associated symptoms  Associated symptoms include polyuria  Pertinent negatives for diabetes include no blurred vision, no chest pain, no fatigue, no foot paresthesias, no foot ulcerations, no polydipsia, no visual change, no weakness and no weight loss  There are no hypoglycemic complications  Symptoms are stable  Pertinent negatives for diabetic complications include no CVA, heart disease, nephropathy or peripheral neuropathy  Risk factors for coronary artery disease include dyslipidemia, diabetes mellitus, hypertension, male sex and obesity  Current diabetic treatment includes oral agent (dual therapy)   He is compliant with treatment all of the time  He is following a generally unhealthy diet  He participates in exercise intermittently  There is no change in his home blood glucose trend  His breakfast blood glucose is taken between 7-8 am  His breakfast blood glucose range is generally 140-180 mg/dl  An ACE inhibitor/angiotensin II receptor blocker is being taken  He does not see a podiatrist Eye exam is not current  The following portions of the patient's history were reviewed and updated as appropriate: allergies, current medications, past family history, past medical history, past social history, past surgical history and problem list     Review of Systems   Constitutional: Negative for fatigue and weight loss  Eyes: Negative for blurred vision  Cardiovascular: Negative for chest pain  Endocrine: Positive for polyuria  Negative for polydipsia  Neurological: Negative for weakness  Objective:      /86 (Cuff Size: Large)   Pulse 88   Temp 97 8 °F (36 6 °C) (Skin)   Resp 16   Ht 5' 7" (1 702 m)   Wt 99 8 kg (220 lb)   SpO2 99%   BMI 34 46 kg/m²          Physical Exam  Constitutional:       General: He is not in acute distress  Appearance: Normal appearance  He is obese  HENT:      Head: Normocephalic  Mouth/Throat:      Mouth: Mucous membranes are moist    Eyes:      General: No scleral icterus  Conjunctiva/sclera: Conjunctivae normal    Neck:      Vascular: No carotid bruit  Cardiovascular:      Rate and Rhythm: Normal rate and regular rhythm  Pulmonary:      Effort: Pulmonary effort is normal       Breath sounds: Normal breath sounds  Abdominal:      Palpations: Abdomen is soft  Tenderness: There is no abdominal tenderness  Musculoskeletal:      Cervical back: Neck supple  Right lower leg: No edema  Left lower leg: No edema  Lymphadenopathy:      Cervical: No cervical adenopathy  Skin:     General: Skin is warm and dry     Neurological:      General: No focal deficit present  Mental Status: He is alert and oriented to person, place, and time  Psychiatric:         Mood and Affect: Mood normal          Behavior: Behavior normal          Thought Content: Thought content normal          Judgment: Judgment normal        Diabetic Foot Exam    Patient's shoes and socks were not removed  BMI Counseling: Body mass index is 34 46 kg/m²  The BMI is above normal  Nutrition recommendations include reducing portion sizes, decreasing overall calorie intake, 3-5 servings of fruits/vegetables daily, reducing fast food intake, consuming healthier snacks, decreasing soda and/or juice intake, moderation in carbohydrate intake, increasing intake of lean protein, reducing intake of saturated fat and trans fat and reducing intake of cholesterol  Exercise recommendations include moderate aerobic physical activity for 150 minutes/week

## 2022-09-08 ENCOUNTER — APPOINTMENT (OUTPATIENT)
Dept: LAB | Facility: CLINIC | Age: 67
End: 2022-09-08
Payer: MEDICARE

## 2022-09-08 ENCOUNTER — OFFICE VISIT (OUTPATIENT)
Dept: FAMILY MEDICINE CLINIC | Facility: CLINIC | Age: 67
End: 2022-09-08
Payer: MEDICARE

## 2022-09-08 VITALS
DIASTOLIC BLOOD PRESSURE: 86 MMHG | BODY MASS INDEX: 34.53 KG/M2 | TEMPERATURE: 97.8 F | WEIGHT: 220 LBS | RESPIRATION RATE: 16 BRPM | OXYGEN SATURATION: 99 % | SYSTOLIC BLOOD PRESSURE: 138 MMHG | HEIGHT: 67 IN | HEART RATE: 88 BPM

## 2022-09-08 DIAGNOSIS — Z12.5 SCREENING PSA (PROSTATE SPECIFIC ANTIGEN): ICD-10-CM

## 2022-09-08 DIAGNOSIS — E11.9 TYPE 2 DIABETES MELLITUS WITHOUT COMPLICATION, WITHOUT LONG-TERM CURRENT USE OF INSULIN (HCC): ICD-10-CM

## 2022-09-08 DIAGNOSIS — N40.0 ENLARGED PROSTATE WITHOUT LOWER URINARY TRACT SYMPTOMS (LUTS): ICD-10-CM

## 2022-09-08 DIAGNOSIS — E78.5 HYPERLIPIDEMIA, UNSPECIFIED HYPERLIPIDEMIA TYPE: ICD-10-CM

## 2022-09-08 DIAGNOSIS — I10 ESSENTIAL HYPERTENSION, BENIGN: ICD-10-CM

## 2022-09-08 DIAGNOSIS — E66.09 CLASS 1 OBESITY DUE TO EXCESS CALORIES WITH SERIOUS COMORBIDITY AND BODY MASS INDEX (BMI) OF 33.0 TO 33.9 IN ADULT: ICD-10-CM

## 2022-09-08 DIAGNOSIS — E55.9 VITAMIN D DEFICIENCY: ICD-10-CM

## 2022-09-08 DIAGNOSIS — I10 ESSENTIAL HYPERTENSION: ICD-10-CM

## 2022-09-08 DIAGNOSIS — E11.9 TYPE 2 DIABETES MELLITUS WITHOUT COMPLICATION, WITHOUT LONG-TERM CURRENT USE OF INSULIN (HCC): Primary | ICD-10-CM

## 2022-09-08 DIAGNOSIS — M15.9 PRIMARY OSTEOARTHRITIS INVOLVING MULTIPLE JOINTS: ICD-10-CM

## 2022-09-08 DIAGNOSIS — E11.9 DM TYPE 2, NOT AT GOAL (HCC): ICD-10-CM

## 2022-09-08 LAB
25(OH)D3 SERPL-MCNC: 35.8 NG/ML (ref 30–100)
ALBUMIN SERPL BCP-MCNC: 4.1 G/DL (ref 3.5–5)
ALP SERPL-CCNC: 62 U/L (ref 46–116)
ALT SERPL W P-5'-P-CCNC: 69 U/L (ref 12–78)
ANION GAP SERPL CALCULATED.3IONS-SCNC: 10 MMOL/L (ref 4–13)
AST SERPL W P-5'-P-CCNC: 29 U/L (ref 5–45)
BILIRUB SERPL-MCNC: 0.48 MG/DL (ref 0.2–1)
BUN SERPL-MCNC: 23 MG/DL (ref 5–25)
CALCIUM SERPL-MCNC: 9.8 MG/DL (ref 8.3–10.1)
CHLORIDE SERPL-SCNC: 104 MMOL/L (ref 96–108)
CHOLEST SERPL-MCNC: 163 MG/DL
CO2 SERPL-SCNC: 22 MMOL/L (ref 21–32)
CREAT SERPL-MCNC: 0.9 MG/DL (ref 0.6–1.3)
CREAT UR-MCNC: 79.5 MG/DL
ERYTHROCYTE [DISTWIDTH] IN BLOOD BY AUTOMATED COUNT: 12.3 % (ref 11.6–15.1)
GFR SERPL CREATININE-BSD FRML MDRD: 88 ML/MIN/1.73SQ M
GLUCOSE P FAST SERPL-MCNC: 118 MG/DL (ref 65–99)
HCT VFR BLD AUTO: 47.1 % (ref 36.5–49.3)
HDLC SERPL-MCNC: 46 MG/DL
HGB BLD-MCNC: 15.6 G/DL (ref 12–17)
LDLC SERPL CALC-MCNC: 90 MG/DL (ref 0–100)
MCH RBC QN AUTO: 29.5 PG (ref 26.8–34.3)
MCHC RBC AUTO-ENTMCNC: 33.1 G/DL (ref 31.4–37.4)
MCV RBC AUTO: 89 FL (ref 82–98)
MICROALBUMIN UR-MCNC: 23.9 MG/L (ref 0–20)
MICROALBUMIN/CREAT 24H UR: 30 MG/G CREATININE (ref 0–30)
NONHDLC SERPL-MCNC: 117 MG/DL
PLATELET # BLD AUTO: 258 THOUSANDS/UL (ref 149–390)
PMV BLD AUTO: 11.6 FL (ref 8.9–12.7)
POTASSIUM SERPL-SCNC: 3.8 MMOL/L (ref 3.5–5.3)
PROT SERPL-MCNC: 7.7 G/DL (ref 6.4–8.4)
PSA SERPL-MCNC: 0.4 NG/ML (ref 0–4)
RBC # BLD AUTO: 5.28 MILLION/UL (ref 3.88–5.62)
SL AMB POCT HEMOGLOBIN AIC: 8.4 (ref ?–6.5)
SODIUM SERPL-SCNC: 136 MMOL/L (ref 135–147)
TRIGL SERPL-MCNC: 134 MG/DL
TSH SERPL DL<=0.05 MIU/L-ACNC: 1.07 UIU/ML (ref 0.45–4.5)
WBC # BLD AUTO: 7.99 THOUSAND/UL (ref 4.31–10.16)

## 2022-09-08 PROCEDURE — 82043 UR ALBUMIN QUANTITATIVE: CPT

## 2022-09-08 PROCEDURE — G0103 PSA SCREENING: HCPCS

## 2022-09-08 PROCEDURE — 80061 LIPID PANEL: CPT

## 2022-09-08 PROCEDURE — 80053 COMPREHEN METABOLIC PANEL: CPT

## 2022-09-08 PROCEDURE — 84443 ASSAY THYROID STIM HORMONE: CPT

## 2022-09-08 PROCEDURE — 36415 COLL VENOUS BLD VENIPUNCTURE: CPT

## 2022-09-08 PROCEDURE — 82570 ASSAY OF URINE CREATININE: CPT

## 2022-09-08 PROCEDURE — 99214 OFFICE O/P EST MOD 30 MIN: CPT | Performed by: FAMILY MEDICINE

## 2022-09-08 PROCEDURE — 82306 VITAMIN D 25 HYDROXY: CPT

## 2022-09-08 PROCEDURE — 85027 COMPLETE CBC AUTOMATED: CPT

## 2022-09-08 PROCEDURE — 83036 HEMOGLOBIN GLYCOSYLATED A1C: CPT | Performed by: FAMILY MEDICINE

## 2022-09-08 RX ORDER — MELOXICAM 15 MG/1
15 TABLET ORAL DAILY
Qty: 90 TABLET | Refills: 3 | Status: SHIPPED | OUTPATIENT
Start: 2022-09-08

## 2022-09-08 RX ORDER — LISINOPRIL AND HYDROCHLOROTHIAZIDE 25; 20 MG/1; MG/1
1 TABLET ORAL DAILY
Qty: 90 TABLET | Refills: 3 | Status: SHIPPED | OUTPATIENT
Start: 2022-09-08

## 2022-09-08 RX ORDER — GLIMEPIRIDE 2 MG/1
2 TABLET ORAL DAILY
Qty: 90 TABLET | Refills: 3 | Status: SHIPPED | OUTPATIENT
Start: 2022-09-08 | End: 2022-12-07

## 2022-09-08 RX ORDER — BLOOD-GLUCOSE METER
KIT MISCELLANEOUS
Qty: 100 EACH | Refills: 3 | Status: SHIPPED | OUTPATIENT
Start: 2022-09-08

## 2022-09-08 RX ORDER — PRAVASTATIN SODIUM 80 MG/1
80 TABLET ORAL DAILY
Qty: 90 TABLET | Refills: 3 | Status: SHIPPED | OUTPATIENT
Start: 2022-09-08

## 2022-09-08 RX ORDER — AMLODIPINE BESYLATE 10 MG/1
10 TABLET ORAL DAILY
Qty: 90 TABLET | Refills: 3 | Status: SHIPPED | OUTPATIENT
Start: 2022-09-08

## 2022-09-08 NOTE — ASSESSMENT & PLAN NOTE
Diabetes is uncontrolled with fingerstick hemoglobin A1c at 8 4 today  Patient to continue present treatment and follow a low sugar and low-carbohydrate diet more carefully  Continue home glucose monitoring    Lab Results   Component Value Date    HGBA1C 8 4 (A) 09/08/2022

## 2023-07-11 ENCOUNTER — RA CDI HCC (OUTPATIENT)
Dept: OTHER | Facility: HOSPITAL | Age: 68
End: 2023-07-11

## 2023-07-11 NOTE — PROGRESS NOTES
[address] [facility] coding opportunities       Chart reviewed, no opportunity found: CHART REVIEWED, NO OPPORTUNITY FOUND        Patients Insurance     Medicare Insurance: Medicare

## 2023-07-18 ENCOUNTER — TELEPHONE (OUTPATIENT)
Dept: FAMILY MEDICINE CLINIC | Facility: CLINIC | Age: 68
End: 2023-07-18

## 2023-07-18 ENCOUNTER — OFFICE VISIT (OUTPATIENT)
Dept: FAMILY MEDICINE CLINIC | Facility: CLINIC | Age: 68
End: 2023-07-18
Payer: MEDICARE

## 2023-07-18 VITALS
DIASTOLIC BLOOD PRESSURE: 80 MMHG | OXYGEN SATURATION: 96 % | WEIGHT: 221 LBS | TEMPERATURE: 98 F | HEART RATE: 84 BPM | RESPIRATION RATE: 16 BRPM | SYSTOLIC BLOOD PRESSURE: 142 MMHG | HEIGHT: 67 IN | BODY MASS INDEX: 34.69 KG/M2

## 2023-07-18 DIAGNOSIS — E78.5 HYPERLIPIDEMIA, UNSPECIFIED HYPERLIPIDEMIA TYPE: ICD-10-CM

## 2023-07-18 DIAGNOSIS — E11.9 DM TYPE 2, NOT AT GOAL (HCC): ICD-10-CM

## 2023-07-18 DIAGNOSIS — M15.9 PRIMARY OSTEOARTHRITIS INVOLVING MULTIPLE JOINTS: ICD-10-CM

## 2023-07-18 DIAGNOSIS — E11.9 TYPE 2 DIABETES MELLITUS WITHOUT COMPLICATION, WITHOUT LONG-TERM CURRENT USE OF INSULIN (HCC): Primary | ICD-10-CM

## 2023-07-18 DIAGNOSIS — E66.09 CLASS 1 OBESITY DUE TO EXCESS CALORIES WITH SERIOUS COMORBIDITY AND BODY MASS INDEX (BMI) OF 33.0 TO 33.9 IN ADULT: ICD-10-CM

## 2023-07-18 DIAGNOSIS — Z12.5 SCREENING PSA (PROSTATE SPECIFIC ANTIGEN): ICD-10-CM

## 2023-07-18 DIAGNOSIS — I10 ESSENTIAL HYPERTENSION: ICD-10-CM

## 2023-07-18 DIAGNOSIS — E55.9 VITAMIN D DEFICIENCY: ICD-10-CM

## 2023-07-18 DIAGNOSIS — H60.501 ACUTE OTITIS EXTERNA OF RIGHT EAR, UNSPECIFIED TYPE: ICD-10-CM

## 2023-07-18 DIAGNOSIS — H61.21 IMPACTED CERUMEN OF RIGHT EAR: ICD-10-CM

## 2023-07-18 LAB — SL AMB POCT HEMOGLOBIN AIC: 9.4 (ref ?–6.5)

## 2023-07-18 PROCEDURE — 99214 OFFICE O/P EST MOD 30 MIN: CPT | Performed by: FAMILY MEDICINE

## 2023-07-18 PROCEDURE — 69209 REMOVE IMPACTED EAR WAX UNI: CPT | Performed by: FAMILY MEDICINE

## 2023-07-18 PROCEDURE — 83036 HEMOGLOBIN GLYCOSYLATED A1C: CPT | Performed by: FAMILY MEDICINE

## 2023-07-18 RX ORDER — GLIMEPIRIDE 2 MG/1
2 TABLET ORAL 2 TIMES DAILY WITH MEALS
Qty: 180 TABLET | Refills: 3 | Status: SHIPPED | OUTPATIENT
Start: 2023-07-18 | End: 2023-10-16

## 2023-07-18 RX ORDER — MELOXICAM 15 MG/1
15 TABLET ORAL DAILY
Qty: 90 TABLET | Refills: 0 | Status: SHIPPED | OUTPATIENT
Start: 2023-07-18 | End: 2023-09-18 | Stop reason: SDUPTHER

## 2023-07-18 NOTE — ASSESSMENT & PLAN NOTE
Diabetes poorly controlled with fingerstick hemoglobin A1c at 9.4 today. Patient instructed to continue metformin 500 mg 2 tablets twice daily and increase glimepiride to 2 mg twice daily. Recommend home glucose monitoring. Discussed goal of hemoglobin A1c less than 7.   Lab Results   Component Value Date    HGBA1C 9.4 (A) 07/18/2023

## 2023-07-18 NOTE — PROGRESS NOTES
Name: Saranya Carpenter      : 1955      MRN: [id number]  Encounter Provider: Pete Motta DO  Encounter Date: 2023   Encounter department: [facility]    Patient given lab requisition for fasting labs as below. Patient restarted on Cortisporin otic solution 4 drops to the right ear every 6 hours instructed to avoid Q-tips. Patient to continue present treatment and will increase glimepiride to 2 mg twice daily. Patient instructed to follow a low-fat, low-salt and a low sugar/carbohydrate diet more carefully and to get regular aerobic exercise walking as tolerated. Weight loss encouraged. Patient instructed to schedule diabetic eye exam.  Return to the office in 3 months or call sooner as needed. 1. Type 2 diabetes mellitus without complication, without long-term current use of insulin ([facility])  -     POCT hemoglobin A1c  -     CBC and Platelet; Future  -     Comprehensive metabolic panel; Future  -     Albumin / creatinine urine ratio; Future  -     glimepiride (AMARYL) 2 mg tablet; Take 1 tablet (2 mg total) by mouth 2 (two) times a day with meals    2. Acute otitis externa of right ear, unspecified type  -     neomycin-polymyxin-hydrocortisone (CORTISPORIN) otic solution; Administer 4 drops to the right ear every 6 (six) hours    3. DM type 2, not at goal [facility])  Assessment & Plan:  Diabetes poorly controlled with fingerstick hemoglobin A1c at 9.4 today. Patient instructed to continue metformin 500 mg 2 tablets twice daily and increase glimepiride to 2 mg twice daily. Recommend home glucose monitoring. Discussed goal of hemoglobin A1c less than 7. Lab Results   Component Value Date    HGBA1C 9.4 (A) 2023         4. Essential hypertension  -     Comprehensive metabolic panel; Future  -     TSH, 3rd generation with Free T4 reflex; Future    5. Hyperlipidemia, unspecified hyperlipidemia type  -     Comprehensive metabolic panel;  Future  -     Lipid panel; Future    6. Class 1 obesity due to excess calories with serious comorbidity and body mass index (BMI) of 33.0 to 33.9 in adult    7. Vitamin D deficiency  -     Vitamin D 25 hydroxy; Future    8. Screening PSA (prostate specific antigen)  -     PSA, Total Screen; Future         Subjective      Patient is here for follow-up appoint for chronic conditions and has not been seen here in almost a year. Patient complains of right ear pain and feeling blocked for the past 5 days. He was treated this with Q-tips and Tylenol without significant relief. He denies cold symptoms. Patient is up-to-date on colonoscopy. Patient is overdue for diabetic eye exam and encouraged to schedule. Earache   There is pain in the right ear. The current episode started in the past 7 days. The problem has been gradually improving. There has been no fever. Pertinent negatives include no coughing, ear discharge, headaches, hearing loss, rhinorrhea or sore throat. He has tried acetaminophen (Qtips) for the symptoms. The treatment provided mild relief. There is no history of a chronic ear infection, hearing loss or a tympanostomy tube. Diabetes  He presents for his follow-up diabetic visit. He has type 2 diabetes mellitus. His disease course has been worsening. There are no hypoglycemic associated symptoms. Pertinent negatives for hypoglycemia include no headaches. Associated symptoms include polyuria. Pertinent negatives for diabetes include no blurred vision, no chest pain, no fatigue, no foot paresthesias, no foot ulcerations, no polydipsia, no visual change, no weakness and no weight loss. There are no hypoglycemic complications. Symptoms are worsening. Pertinent negatives for diabetic complications include no CVA, heart disease, nephropathy or peripheral neuropathy. Risk factors for coronary artery disease include family history, dyslipidemia, diabetes mellitus, hypertension, male sex and obesity.  Current diabetic treatment includes oral agent (dual therapy). He is compliant with treatment most of the time. His weight is stable. He is following a generally unhealthy diet. He participates in exercise intermittently. There is no change in his home blood glucose trend. His breakfast blood glucose is taken between 7-8 am. His breakfast blood glucose range is generally 130-140 mg/dl. His bedtime blood glucose is taken between 10-11 pm. His bedtime blood glucose range is generally 180-200 mg/dl. An ACE inhibitor/angiotensin II receptor blocker is being taken. He does not see a podiatrist.Eye exam is not current. Review of Systems   Constitutional: Negative for fatigue and weight loss. HENT: Positive for ear pain. Negative for ear discharge, hearing loss, rhinorrhea and sore throat. Eyes: Negative for blurred vision. Respiratory: Negative for cough. Cardiovascular: Negative for chest pain. Endocrine: Positive for polyuria. Negative for polydipsia. Neurological: Negative for weakness and headaches.        Current Outpatient Medications on File Prior to Visit   Medication Sig   • amLODIPine (NORVASC) 10 mg tablet Take 1 tablet (10 mg total) by mouth daily   • aspirin (ECOTRIN LOW STRENGTH) 81 mg EC tablet Take 1 tablet by mouth daily   • Cholecalciferol (VITAMIN D) 2000 units CAPS Take 1 capsule by mouth daily   • glucose blood (FREESTYLE LITE) test strip USE  TO TEST BLOOD SUGAR ONCE A DAY   • lisinopril-hydrochlorothiazide (PRINZIDE,ZESTORETIC) 20-25 MG per tablet Take 1 tablet by mouth daily   • metFORMIN (GLUCOPHAGE) 500 mg tablet Take 2 tablets (1,000 mg total) by mouth 2 (two) times a day   • pravastatin (PRAVACHOL) 80 mg tablet Take 1 tablet (80 mg total) by mouth daily   • [DISCONTINUED] glimepiride (AMARYL) 2 mg tablet Take 1 tablet (2 mg total) by mouth daily   • Lancets (FREESTYLE) lancets by Does not apply route daily (Patient not taking: Reported on 8/19/2021)   • mupirocin (BACTROBAN) 2 % ointment mupirocin 2 % topical ointment   APPLY TWICE A DAY TO EACH NOSTRIL STARTING FIVE DAYS BEFORE SURGERY (Patient not taking: No sig reported)   • [DISCONTINUED] meloxicam (MOBIC) 15 mg tablet Take 1 tablet (15 mg total) by mouth daily (Patient not taking: Reported on 7/18/2023)       Objective     /80 (BP Location: Left arm, Patient Position: Sitting, Cuff Size: Standard)   Pulse 84   Temp 98 °F (36.7 °C) (Tympanic)   Resp 16   Ht 5' 7" (1.702 m)   Wt 100 kg (221 lb)   SpO2 96%   BMI 34.61 kg/m²     Physical Exam  Constitutional:       General: He is not in acute distress. Appearance: Normal appearance. He is obese. He is not ill-appearing, toxic-appearing or diaphoretic. HENT:      Head: Normocephalic. Right Ear: There is impacted cerumen. Left Ear: Tympanic membrane normal.      Ears:      Comments: Right ear canal mildly inflamed. Nose: Nose normal.      Mouth/Throat:      Mouth: Mucous membranes are moist.      Pharynx: Oropharynx is clear. Eyes:      General: No scleral icterus. Conjunctiva/sclera: Conjunctivae normal.   Neck:      Vascular: No carotid bruit. Cardiovascular:      Rate and Rhythm: Normal rate and regular rhythm. Pulmonary:      Effort: Pulmonary effort is normal.      Breath sounds: Normal breath sounds. Abdominal:      Palpations: Abdomen is soft. Tenderness: There is no abdominal tenderness. Musculoskeletal:      Cervical back: Neck supple. Right lower leg: No edema. Left lower leg: No edema. Lymphadenopathy:      Cervical: No cervical adenopathy. Skin:     General: Skin is warm and dry. Neurological:      General: No focal deficit present. Mental Status: He is alert and oriented to person, place, and time. Psychiatric:         Mood and Affect: Mood normal.         Behavior: Behavior normal.         Thought Content:  Thought content normal.         Judgment: Judgment normal.     Ear cerumen removal    Date/Time: 7/18/2023 9:00 AM    Performed by: Arina Allen DO  Authorized by: Arina Allne DO  Universal Protocol:  Consent: Verbal consent obtained.   Risks and benefits: risks, benefits and alternatives were discussed  Consent given by: patient  Patient understanding: patient states understanding of the procedure being performed      Patient location:  Clinic  Procedure details:     Local anesthetic:  None    Location:  R ear    Procedure type: irrigation only      Approach:  External  Post-procedure details:     Complication:  None    Patient tolerance of procedure:  Procedure terminated at patient's request        Arina Allen DO

## 2023-07-18 NOTE — TELEPHONE ENCOUNTER
Patient called to report that the tylenol he is taking is not helping with the pain and is requesting something stronger

## 2023-07-19 ENCOUNTER — APPOINTMENT (OUTPATIENT)
Dept: LAB | Facility: CLINIC | Age: 68
End: 2023-07-19
Payer: MEDICARE

## 2023-07-19 DIAGNOSIS — E11.9 TYPE 2 DIABETES MELLITUS WITHOUT COMPLICATION, WITHOUT LONG-TERM CURRENT USE OF INSULIN (HCC): ICD-10-CM

## 2023-07-19 DIAGNOSIS — E55.9 VITAMIN D DEFICIENCY: ICD-10-CM

## 2023-07-19 DIAGNOSIS — Z12.5 SCREENING PSA (PROSTATE SPECIFIC ANTIGEN): ICD-10-CM

## 2023-07-19 DIAGNOSIS — I10 ESSENTIAL HYPERTENSION: ICD-10-CM

## 2023-07-19 DIAGNOSIS — E78.5 HYPERLIPIDEMIA, UNSPECIFIED HYPERLIPIDEMIA TYPE: ICD-10-CM

## 2023-07-19 LAB
25(OH)D3 SERPL-MCNC: 58.4 NG/ML (ref 30–100)
ALBUMIN SERPL BCP-MCNC: 4 G/DL (ref 3.5–5)
ALP SERPL-CCNC: 67 U/L (ref 46–116)
ALT SERPL W P-5'-P-CCNC: 59 U/L (ref 12–78)
ANION GAP SERPL CALCULATED.3IONS-SCNC: 4 MMOL/L
AST SERPL W P-5'-P-CCNC: 18 U/L (ref 5–45)
BILIRUB SERPL-MCNC: 0.36 MG/DL (ref 0.2–1)
BUN SERPL-MCNC: 20 MG/DL (ref 5–25)
CALCIUM SERPL-MCNC: 9.5 MG/DL (ref 8.3–10.1)
CHLORIDE SERPL-SCNC: 107 MMOL/L (ref 96–108)
CHOLEST SERPL-MCNC: 148 MG/DL
CO2 SERPL-SCNC: 27 MMOL/L (ref 21–32)
CREAT SERPL-MCNC: 0.87 MG/DL (ref 0.6–1.3)
CREAT UR-MCNC: 62.4 MG/DL
ERYTHROCYTE [DISTWIDTH] IN BLOOD BY AUTOMATED COUNT: 12.7 % (ref 11.6–15.1)
GFR SERPL CREATININE-BSD FRML MDRD: 89 ML/MIN/1.73SQ M
GLUCOSE P FAST SERPL-MCNC: 163 MG/DL (ref 65–99)
HCT VFR BLD AUTO: 43.5 % (ref 36.5–49.3)
HDLC SERPL-MCNC: 57 MG/DL
HGB BLD-MCNC: 14.5 G/DL (ref 12–17)
LDLC SERPL CALC-MCNC: 71 MG/DL (ref 0–100)
MCH RBC QN AUTO: 29.4 PG (ref 26.8–34.3)
MCHC RBC AUTO-ENTMCNC: 33.3 G/DL (ref 31.4–37.4)
MCV RBC AUTO: 88 FL (ref 82–98)
MICROALBUMIN UR-MCNC: 15.5 MG/L (ref 0–20)
MICROALBUMIN/CREAT 24H UR: 25 MG/G CREATININE (ref 0–30)
NONHDLC SERPL-MCNC: 91 MG/DL
PLATELET # BLD AUTO: 272 THOUSANDS/UL (ref 149–390)
PMV BLD AUTO: 11.4 FL (ref 8.9–12.7)
POTASSIUM SERPL-SCNC: 4.1 MMOL/L (ref 3.5–5.3)
PROT SERPL-MCNC: 7.5 G/DL (ref 6.4–8.4)
PSA SERPL-MCNC: 0.3 NG/ML (ref 0–4)
RBC # BLD AUTO: 4.94 MILLION/UL (ref 3.88–5.62)
SODIUM SERPL-SCNC: 138 MMOL/L (ref 135–147)
TRIGL SERPL-MCNC: 99 MG/DL
TSH SERPL DL<=0.05 MIU/L-ACNC: 1.87 UIU/ML (ref 0.45–4.5)
WBC # BLD AUTO: 10.81 THOUSAND/UL (ref 4.31–10.16)

## 2023-07-19 PROCEDURE — 82306 VITAMIN D 25 HYDROXY: CPT

## 2023-07-19 PROCEDURE — 80061 LIPID PANEL: CPT

## 2023-07-19 PROCEDURE — 82570 ASSAY OF URINE CREATININE: CPT

## 2023-07-19 PROCEDURE — 85027 COMPLETE CBC AUTOMATED: CPT

## 2023-07-19 PROCEDURE — 82043 UR ALBUMIN QUANTITATIVE: CPT

## 2023-07-19 PROCEDURE — 36415 COLL VENOUS BLD VENIPUNCTURE: CPT

## 2023-07-19 PROCEDURE — 80053 COMPREHEN METABOLIC PANEL: CPT

## 2023-07-19 PROCEDURE — G0103 PSA SCREENING: HCPCS

## 2023-07-19 PROCEDURE — 84443 ASSAY THYROID STIM HORMONE: CPT

## 2023-07-20 ENCOUNTER — OFFICE VISIT (OUTPATIENT)
Dept: FAMILY MEDICINE CLINIC | Facility: CLINIC | Age: 68
End: 2023-07-20
Payer: MEDICARE

## 2023-07-20 VITALS
HEART RATE: 92 BPM | SYSTOLIC BLOOD PRESSURE: 158 MMHG | BODY MASS INDEX: 35 KG/M2 | HEIGHT: 67 IN | RESPIRATION RATE: 16 BRPM | TEMPERATURE: 97.2 F | WEIGHT: 223 LBS | OXYGEN SATURATION: 95 % | DIASTOLIC BLOOD PRESSURE: 84 MMHG

## 2023-07-20 DIAGNOSIS — H60.501 ACUTE OTITIS EXTERNA OF RIGHT EAR, UNSPECIFIED TYPE: ICD-10-CM

## 2023-07-20 DIAGNOSIS — H66.001 NON-RECURRENT ACUTE SUPPURATIVE OTITIS MEDIA OF RIGHT EAR WITHOUT SPONTANEOUS RUPTURE OF TYMPANIC MEMBRANE: Primary | ICD-10-CM

## 2023-07-20 PROCEDURE — 99213 OFFICE O/P EST LOW 20 MIN: CPT | Performed by: FAMILY MEDICINE

## 2023-07-20 RX ORDER — AMOXICILLIN AND CLAVULANATE POTASSIUM 875; 125 MG/1; MG/1
1 TABLET, FILM COATED ORAL EVERY 12 HOURS SCHEDULED
Qty: 20 TABLET | Refills: 0 | Status: SHIPPED | OUTPATIENT
Start: 2023-07-20 | End: 2023-07-30

## 2023-07-20 NOTE — PROGRESS NOTES
Name: Adriane Ley      : 1955      MRN: [id number]  Encounter Provider: Rubi Gonzalez DO  Encounter Date: 2023   Encounter department: [address] [place] [address]   Patient will be started on Augmentin 875 mg 1 twice daily with food for 10 days and continue Cortisporin otic solution 4 drops to the right ear every 6 hours. Patient may continue ibuprofen as needed. Recommend increased fluids and rest.  Return to the office in 1 week or call sooner as needed. If symptoms persist discussed referral to ENT. 1. Non-recurrent acute suppurative otitis media of right ear without spontaneous rupture of tympanic membrane  -     amoxicillin-clavulanate (AUGMENTIN) 875-125 mg per tablet; Take 1 tablet by mouth every 12 (twelve) hours for 10 days    2. Acute otitis externa of right ear, unspecified type  -     neomycin-polymyxin-hydrocortisone (CORTISPORIN) otic solution; Administer 4 drops to the right ear every 6 (six) hours           Subjective      Patient complains of increased right ear pain, drainage and decreased hearing. Denies fever or headache. He has been using Cortisporin otic solution without improvement and has been taking ibuprofen with some relief. Earache   There is pain in the right ear. This is a new problem. The current episode started in the past 7 days. The problem has been gradually worsening. There has been no fever. Associated symptoms include ear discharge and hearing loss. Pertinent negatives include no headaches or sore throat. He has tried ear drops and NSAIDs for the symptoms. The treatment provided moderate relief. There is no history of a chronic ear infection, hearing loss or a tympanostomy tube. Review of Systems   HENT: Positive for ear discharge, ear pain and hearing loss. Negative for sore throat. Neurological: Negative for headaches.        Current Outpatient Medications on File Prior to Visit   Medication Sig   • amLODIPine (NORVASC) 10 mg tablet Take 1 tablet (10 mg total) by mouth daily   • Cholecalciferol (VITAMIN D) 2000 units CAPS Take 1 capsule by mouth daily   • glimepiride (AMARYL) 2 mg tablet Take 1 tablet (2 mg total) by mouth 2 (two) times a day with meals   • glucose blood (FREESTYLE LITE) test strip USE  TO TEST BLOOD SUGAR ONCE A DAY   • Lancets (FREESTYLE) lancets Use daily   • lisinopril-hydrochlorothiazide (PRINZIDE,ZESTORETIC) 20-25 MG per tablet Take 1 tablet by mouth daily   • metFORMIN (GLUCOPHAGE) 500 mg tablet Take 2 tablets (1,000 mg total) by mouth 2 (two) times a day   • pravastatin (PRAVACHOL) 80 mg tablet Take 1 tablet (80 mg total) by mouth daily   • aspirin (ECOTRIN LOW STRENGTH) 81 mg EC tablet Take 1 tablet by mouth daily (Patient not taking: Reported on 7/20/2023)   • meloxicam (MOBIC) 15 mg tablet Take 1 tablet (15 mg total) by mouth daily (Patient not taking: Reported on 7/20/2023)   • mupirocin (BACTROBAN) 2 % ointment mupirocin 2 % topical ointment   APPLY TWICE A DAY TO EACH NOSTRIL STARTING FIVE DAYS BEFORE SURGERY (Patient not taking: No sig reported)   • [DISCONTINUED] neomycin-polymyxin-hydrocortisone (CORTISPORIN) otic solution Administer 4 drops to the right ear every 6 (six) hours (Patient not taking: Reported on 7/20/2023)       Objective     /84 (BP Location: Left arm, Patient Position: Sitting, Cuff Size: Large)   Pulse 92   Temp (!) 97.2 °F (36.2 °C) (Tympanic)   Resp 16   Ht 5' 7" (1.702 m)   Wt 101 kg (223 lb)   SpO2 95%   BMI 34.93 kg/m²     Physical Exam  Constitutional:       General: He is not in acute distress. Appearance: Normal appearance. He is obese. He is not ill-appearing, toxic-appearing or diaphoretic. HENT:      Head: Normocephalic. Right Ear: Ear canal normal. There is no impacted cerumen. Left Ear: Tympanic membrane and ear canal normal. There is no impacted cerumen. Ears:      Comments: Right TM with erythema and slight bulge.   Slight purulent discharge negative blood. Nose: No congestion. Mouth/Throat:      Mouth: Mucous membranes are moist.      Pharynx: Oropharynx is clear. Eyes:      General: No scleral icterus. Conjunctiva/sclera: Conjunctivae normal.   Cardiovascular:      Rate and Rhythm: Normal rate and regular rhythm. Pulmonary:      Effort: Pulmonary effort is normal.      Breath sounds: Normal breath sounds. Musculoskeletal:      Cervical back: Neck supple. Right lower leg: No edema. Left lower leg: No edema. Lymphadenopathy:      Cervical: No cervical adenopathy. Skin:     General: Skin is warm and dry. Neurological:      General: No focal deficit present. Mental Status: He is alert and oriented to person, place, and time. Psychiatric:         Mood and Affect: Mood normal.         Behavior: Behavior normal.         Thought Content:  Thought content normal.         Judgment: Judgment normal.       Akash Linda DO

## 2023-08-01 ENCOUNTER — TELEPHONE (OUTPATIENT)
Dept: FAMILY MEDICINE CLINIC | Facility: CLINIC | Age: 68
End: 2023-08-01

## 2023-08-01 DIAGNOSIS — H66.001 NON-RECURRENT ACUTE SUPPURATIVE OTITIS MEDIA OF RIGHT EAR WITHOUT SPONTANEOUS RUPTURE OF TYMPANIC MEMBRANE: Primary | ICD-10-CM

## 2023-08-01 DIAGNOSIS — H60.501 ACUTE OTITIS EXTERNA OF RIGHT EAR, UNSPECIFIED TYPE: ICD-10-CM

## 2023-08-01 NOTE — TELEPHONE ENCOUNTER
Patient is still having some issues with his ear. He is asking if he can get a referral to ENT, per the discussion at his last office visit. Thanks!

## 2023-09-16 DIAGNOSIS — I10 ESSENTIAL HYPERTENSION, BENIGN: ICD-10-CM

## 2023-09-16 DIAGNOSIS — E11.9 TYPE 2 DIABETES MELLITUS WITHOUT COMPLICATION, WITHOUT LONG-TERM CURRENT USE OF INSULIN (HCC): ICD-10-CM

## 2023-09-16 DIAGNOSIS — E78.5 HYPERLIPIDEMIA, UNSPECIFIED HYPERLIPIDEMIA TYPE: ICD-10-CM

## 2023-09-17 RX ORDER — PRAVASTATIN SODIUM 80 MG/1
80 TABLET ORAL DAILY
Qty: 90 TABLET | Refills: 3 | Status: SHIPPED | OUTPATIENT
Start: 2023-09-17

## 2023-09-17 RX ORDER — LISINOPRIL AND HYDROCHLOROTHIAZIDE 25; 20 MG/1; MG/1
1 TABLET ORAL DAILY
Qty: 90 TABLET | Refills: 3 | Status: SHIPPED | OUTPATIENT
Start: 2023-09-17

## 2023-09-17 RX ORDER — AMLODIPINE BESYLATE 10 MG/1
10 TABLET ORAL DAILY
Qty: 90 TABLET | Refills: 3 | Status: SHIPPED | OUTPATIENT
Start: 2023-09-17

## 2023-09-18 DIAGNOSIS — M15.9 PRIMARY OSTEOARTHRITIS INVOLVING MULTIPLE JOINTS: ICD-10-CM

## 2023-09-18 DIAGNOSIS — E11.9 TYPE 2 DIABETES MELLITUS WITHOUT COMPLICATION, WITHOUT LONG-TERM CURRENT USE OF INSULIN (HCC): ICD-10-CM

## 2023-09-18 RX ORDER — GLIMEPIRIDE 2 MG/1
2 TABLET ORAL 2 TIMES DAILY WITH MEALS
Qty: 180 TABLET | Refills: 3 | Status: SHIPPED | OUTPATIENT
Start: 2023-09-18 | End: 2024-09-12

## 2023-09-18 NOTE — TELEPHONE ENCOUNTER
Requested medication(s) are due for refill today: Yes  Patient has already received a courtesy refill: No  Other reason request has been forwarded to provider: [facility]

## 2023-09-18 NOTE — TELEPHONE ENCOUNTER
Patient is also requesting a refill for Meloxicam and it is not currently a med that patient is taking

## 2023-09-19 RX ORDER — MELOXICAM 15 MG/1
15 TABLET ORAL DAILY
Qty: 90 TABLET | Refills: 0 | Status: SHIPPED | OUTPATIENT
Start: 2023-09-19

## 2023-10-18 ENCOUNTER — OFFICE VISIT (OUTPATIENT)
Dept: FAMILY MEDICINE CLINIC | Facility: CLINIC | Age: 68
End: 2023-10-18
Payer: MEDICARE

## 2023-10-18 VITALS
HEIGHT: 67 IN | HEART RATE: 84 BPM | BODY MASS INDEX: 35.03 KG/M2 | RESPIRATION RATE: 16 BRPM | TEMPERATURE: 97.1 F | WEIGHT: 223.2 LBS | OXYGEN SATURATION: 99 % | DIASTOLIC BLOOD PRESSURE: 90 MMHG | SYSTOLIC BLOOD PRESSURE: 150 MMHG

## 2023-10-18 DIAGNOSIS — E55.9 VITAMIN D DEFICIENCY: ICD-10-CM

## 2023-10-18 DIAGNOSIS — E11.9 DM TYPE 2, NOT AT GOAL (HCC): ICD-10-CM

## 2023-10-18 DIAGNOSIS — E66.09 CLASS 1 OBESITY DUE TO EXCESS CALORIES WITH SERIOUS COMORBIDITY AND BODY MASS INDEX (BMI) OF 33.0 TO 33.9 IN ADULT: ICD-10-CM

## 2023-10-18 DIAGNOSIS — E78.5 HYPERLIPIDEMIA, UNSPECIFIED HYPERLIPIDEMIA TYPE: ICD-10-CM

## 2023-10-18 DIAGNOSIS — M15.9 PRIMARY OSTEOARTHRITIS INVOLVING MULTIPLE JOINTS: ICD-10-CM

## 2023-10-18 DIAGNOSIS — E11.9 TYPE 2 DIABETES MELLITUS WITHOUT COMPLICATION, WITHOUT LONG-TERM CURRENT USE OF INSULIN (HCC): Primary | ICD-10-CM

## 2023-10-18 DIAGNOSIS — E11.9 TYPE 2 DIABETES MELLITUS WITHOUT COMPLICATION, WITHOUT LONG-TERM CURRENT USE OF INSULIN (HCC): ICD-10-CM

## 2023-10-18 DIAGNOSIS — I10 ESSENTIAL HYPERTENSION: ICD-10-CM

## 2023-10-18 LAB — SL AMB POCT HEMOGLOBIN AIC: 8.8 (ref ?–6.5)

## 2023-10-18 PROCEDURE — 83036 HEMOGLOBIN GLYCOSYLATED A1C: CPT | Performed by: FAMILY MEDICINE

## 2023-10-18 PROCEDURE — 99214 OFFICE O/P EST MOD 30 MIN: CPT | Performed by: FAMILY MEDICINE

## 2023-10-18 NOTE — PROGRESS NOTES
Assessment/Plan:  Patient declines flu vaccine and pneumonia vaccine. Patient will add Invokana 100 mg daily. Discussed potential side effects and importance of drinking plenty of water daily. Continue other medications same. Instructed to follow a low-fat, low-salt and a low sugar/carbohydrate diet more carefully and to get regular aerobic exercise walking as tolerated. Weight loss encouraged. Continue home glucose monitoring. Patient encouraged to schedule diabetic eye exam.  Return to the office in 3 months. DM type 2, not at goal [facility])  Diabetes mildly improved although remains poorly controlled with hemoglobin A1c at 8.8 today. Discussed treatment options with patient. Lab Results   Component Value Date    HGBA1C 8.8 (A) 10/18/2023        Diagnoses and all orders for this visit:    Type 2 diabetes mellitus without complication, without long-term current use of insulin ([facility])  -     POCT hemoglobin A1c  -     canagliflozin (Invokana) 100 mg; Take 1 tablet (100 mg total) by mouth daily before breakfast    DM type 2, not at goal [facility])    Essential hypertension    Hyperlipidemia, unspecified hyperlipidemia type    Class 1 obesity due to excess calories with serious comorbidity and body mass index (BMI) of 33.0 to 33.9 in adult    Primary osteoarthritis involving multiple joints    Vitamin D deficiency          Subjective:      Patient ID: Samantha Anaya is a 80 y.o. male. Patient is here for follow-up appoint for chronic conditions and reviewed fasting labs from last appointment. Patient's been feeling fairly well overall. No regular exercise program although he states he has been active working around the house and doing yard work. Patient is due for diabetic foot exam performed today and overdue for diabetic eye exam and encouraged to schedule. Patient did not take any of his medications yet this morning. Diabetes  He presents for his follow-up diabetic visit. He has type 2 diabetes mellitus. His disease course has been improving. There are no hypoglycemic associated symptoms. Associated symptoms include foot paresthesias. Pertinent negatives for diabetes include no blurred vision, no chest pain, no fatigue, no foot ulcerations, no polydipsia, no polyuria, no visual change, no weakness and no weight loss. There are no hypoglycemic complications. Symptoms are stable. Diabetic complications include peripheral neuropathy. Pertinent negatives for diabetic complications include no CVA, heart disease or nephropathy. Risk factors for coronary artery disease include dyslipidemia, diabetes mellitus, hypertension, male sex, obesity and family history. Current diabetic treatment includes oral agent (dual therapy). He is compliant with treatment all of the time. His weight is stable. He is following a generally unhealthy diet. He participates in exercise intermittently. His breakfast blood glucose is taken between 7-8 am. His breakfast blood glucose range is generally  mg/dl. His bedtime blood glucose is taken between 9-10 pm. His bedtime blood glucose range is generally 180-200 mg/dl. An ACE inhibitor/angiotensin II receptor blocker is being taken. He does not see a podiatrist.Eye exam is not current. The following portions of the patient's history were reviewed and updated as appropriate: allergies, current medications, past family history, past medical history, past social history, past surgical history, and problem list.    Review of Systems   Constitutional:  Negative for fatigue and weight loss. Eyes:  Negative for blurred vision. Cardiovascular:  Negative for chest pain. Endocrine: Negative for polydipsia and polyuria. Neurological:  Negative for weakness.          Objective:      /90 (BP Location: Left arm, Patient Position: Sitting, Cuff Size: Standard)   Pulse 84   Temp (!) 97.1 °F (36.2 °C) (Tympanic)   Resp 16   Ht 5' 7" (1.702 m)   Wt 101 kg (223 lb 3.2 oz)   SpO2 99% BMI 34.96 kg/m²          Physical Exam  Constitutional:       General: He is not in acute distress. Appearance: Normal appearance. He is obese. HENT:      Head: Normocephalic. Mouth/Throat:      Mouth: Mucous membranes are moist.   Eyes:      General: No scleral icterus. Conjunctiva/sclera: Conjunctivae normal.   Neck:      Vascular: No carotid bruit. Cardiovascular:      Rate and Rhythm: Normal rate and regular rhythm. Pulses: no weak pulses          Dorsalis pedis pulses are 1+ on the right side and 1+ on the left side. Posterior tibial pulses are 1+ on the right side and 1+ on the left side. Pulmonary:      Effort: Pulmonary effort is normal.      Breath sounds: Normal breath sounds. Abdominal:      Palpations: Abdomen is soft. Tenderness: There is no abdominal tenderness. Musculoskeletal:      Cervical back: Neck supple. Right lower leg: No edema. Left lower leg: No edema. Feet:    Feet:      Right foot:      Skin integrity: No ulcer, skin breakdown, erythema, warmth, callus or dry skin. Left foot:      Skin integrity: No ulcer, skin breakdown, erythema, warmth, callus or dry skin. Lymphadenopathy:      Cervical: No cervical adenopathy. Skin:     General: Skin is warm and dry. Neurological:      General: No focal deficit present. Mental Status: He is alert and oriented to person, place, and time. Psychiatric:         Mood and Affect: Mood normal.         Behavior: Behavior normal.         Thought Content: Thought content normal.         Judgment: Judgment normal.         Diabetic Foot Exam    Patient's shoes and socks removed. Right Foot/Ankle   Right Foot Inspection  Skin Exam: skin normal and skin intact. No dry skin, no warmth, no callus, no erythema, no maceration, no abnormal color, no pre-ulcer, no ulcer and no callus. Vascular  Capillary refills: < 3 seconds  The right DP pulse is 1+. The right PT pulse is 1+.      Left Foot/Ankle  Left Foot Inspection  Skin Exam: skin normal and skin intact. No dry skin, no warmth, no erythema, no maceration, normal color, no pre-ulcer, no ulcer and no callus. Vascular  Capillary refills: < 3 seconds  The left DP pulse is 1+. The left PT pulse is 1+.      Assign Risk Category  No deformity present  No loss of protective sensation  No weak pulses  Risk: 0

## 2023-12-20 ENCOUNTER — TELEPHONE (OUTPATIENT)
Dept: FAMILY MEDICINE CLINIC | Facility: CLINIC | Age: 68
End: 2023-12-20

## 2023-12-20 NOTE — TELEPHONE ENCOUNTER
Received a call from the patient stating that he was told to contact the office if Jardiance was too expensive. It was going to cost him over $500. It was ordered 10/18/23 and he has never picked it up. He said he kept forgetting to call and let us know of the cost. Is there something else you can recommend he takes? Thanks!

## 2023-12-21 LAB
LEFT EYE DIABETIC RETINOPATHY: NORMAL
RIGHT EYE DIABETIC RETINOPATHY: POSITIVE

## 2024-01-09 ENCOUNTER — OFFICE VISIT (OUTPATIENT)
Dept: FAMILY MEDICINE CLINIC | Facility: CLINIC | Age: 69
End: 2024-01-09
Payer: MEDICARE

## 2024-01-09 VITALS
OXYGEN SATURATION: 98 % | SYSTOLIC BLOOD PRESSURE: 136 MMHG | DIASTOLIC BLOOD PRESSURE: 86 MMHG | HEART RATE: 100 BPM | BODY MASS INDEX: 34.44 KG/M2 | TEMPERATURE: 97 F | WEIGHT: 219.4 LBS | RESPIRATION RATE: 16 BRPM | HEIGHT: 67 IN

## 2024-01-09 DIAGNOSIS — E11.9 DM TYPE 2, NOT AT GOAL (HCC): ICD-10-CM

## 2024-01-09 DIAGNOSIS — M54.42 ACUTE LEFT-SIDED LOW BACK PAIN WITH LEFT-SIDED SCIATICA: Primary | ICD-10-CM

## 2024-01-09 LAB — SL AMB POCT HEMOGLOBIN AIC: 9.3 (ref ?–6.5)

## 2024-01-09 PROCEDURE — 83036 HEMOGLOBIN GLYCOSYLATED A1C: CPT | Performed by: FAMILY MEDICINE

## 2024-01-09 PROCEDURE — 99214 OFFICE O/P EST MOD 30 MIN: CPT | Performed by: FAMILY MEDICINE

## 2024-01-09 RX ORDER — FINASTERIDE 5 MG/1
5 TABLET, FILM COATED ORAL DAILY
COMMUNITY
Start: 2024-01-03

## 2024-01-09 RX ORDER — TIZANIDINE HYDROCHLORIDE 2 MG/1
2 CAPSULE, GELATIN COATED ORAL 3 TIMES DAILY PRN
Qty: 30 CAPSULE | Refills: 0 | Status: SHIPPED | OUTPATIENT
Start: 2024-01-09

## 2024-01-09 RX ORDER — AMOXICILLIN 500 MG/1
500 CAPSULE ORAL EVERY 24 HOURS
COMMUNITY

## 2024-01-09 NOTE — ASSESSMENT & PLAN NOTE
Diabetes remains poorly controlled with fingerstick hemoglobin A1c at 9.3 today.  Patient is unable to afford Jardiance.  Patient instructed to find out if health insurance prescription plan covers Farxiga or Eugeneuvia.  Lab Results   Component Value Date    HGBA1C 9.3 (A) 01/09/2024

## 2024-01-09 NOTE — PROGRESS NOTES
Assessment/Plan:  Patient is being sent for x-ray lumbar spine.  Patient be started on tizanidine 2 mg 3 times daily as needed, caution regarding drowsiness.  Patient may continue ibuprofen as needed and ice as needed.  Patient is being referred for physical therapy evaluation and treatment.  Return to the office in 3 months or sooner as needed.  DM type 2, not at goal (HCC)  Diabetes remains poorly controlled with fingerstick hemoglobin A1c at 9.3 today.  Patient is unable to afford Jardiance.  Patient instructed to find out if health insurance prescription plan covers Farxiga or Januvia.  Lab Results   Component Value Date    HGBA1C 9.3 (A) 01/09/2024        Diagnoses and all orders for this visit:    Acute left-sided low back pain with left-sided sciatica  -     XR spine lumbar minimum 4 views non injury; Future  -     TiZANidine (ZANAFLEX) 2 MG capsule; Take 1 capsule (2 mg total) by mouth 3 (three) times a day as needed for muscle spasms  -     Ambulatory Referral to Physical Therapy; Future    DM type 2, not at goal (HCC)  -     POCT hemoglobin A1c    Other orders  -     amoxicillin (AMOXIL) 500 mg capsule; Take 500 mg by mouth every 24 hours (Patient not taking: Reported on 1/9/2024)  -     finasteride (PROSCAR) 5 mg tablet; Take 5 mg by mouth daily  -     mupirocin (BACTROBAN) 2 % ointment; Apply topically daily (Patient not taking: Reported on 1/9/2024)          Subjective:      Patient ID: Avel Carter is a 68 y.o. male.    Patient complains of left-sided low back pain for the past 3 weeks.  He denies any specific injury or fall.  He admits to intermittent pain radiating into the left leg to the knee.  He denies numbness tingling or weakness.  No problem with bowel or bladder function.  He has treated this with ibuprofen and ice with some relief.    Back Pain  This is a new problem. The current episode started 1 to 4 weeks ago. The problem has been gradually improving since onset. The pain is present in  "the lumbar spine (left). The quality of the pain is described as aching (sharp). The pain radiates to the left thigh and left knee. The pain is Worse during the day. The symptoms are aggravated by standing, bending and twisting. Associated symptoms include leg pain. Pertinent negatives include no bladder incontinence, bowel incontinence, numbness, perianal numbness, tingling or weakness. He has tried ice, NSAIDs and heat for the symptoms. The treatment provided moderate relief.       The following portions of the patient's history were reviewed and updated as appropriate: allergies, current medications, past family history, past medical history, past social history, past surgical history, and problem list.    Review of Systems   Gastrointestinal:  Negative for bowel incontinence.   Genitourinary:  Negative for bladder incontinence.   Musculoskeletal:  Positive for back pain.   Neurological:  Negative for tingling, weakness and numbness.         Objective:      /86   Pulse 100   Temp (!) 97 °F (36.1 °C)   Resp 16   Ht 5' 7\" (1.702 m)   Wt 99.5 kg (219 lb 6.4 oz)   SpO2 98%   BMI 34.36 kg/m²          Physical Exam  Constitutional:       General: He is not in acute distress.     Appearance: Normal appearance. He is obese.   HENT:      Head: Normocephalic.      Mouth/Throat:      Mouth: Mucous membranes are moist.   Eyes:      General: No scleral icterus.     Conjunctiva/sclera: Conjunctivae normal.   Cardiovascular:      Rate and Rhythm: Normal rate and regular rhythm.   Pulmonary:      Effort: Pulmonary effort is normal.      Breath sounds: Normal breath sounds.   Abdominal:      Palpations: Abdomen is soft.      Tenderness: There is no abdominal tenderness.   Musculoskeletal:         General: Tenderness present.      Cervical back: Neck supple.      Right lower leg: No edema.      Left lower leg: No edema.      Comments: Mild left lumbosacral tenderness.  Negative straight leg raise bilaterally.  Tight " hamstrings bilaterally.  Lower extremity strength and DTRs intact.   Lymphadenopathy:      Cervical: No cervical adenopathy.   Skin:     General: Skin is warm and dry.   Neurological:      General: No focal deficit present.      Mental Status: He is alert and oriented to person, place, and time.   Psychiatric:         Mood and Affect: Mood normal.         Behavior: Behavior normal.         Thought Content: Thought content normal.         Judgment: Judgment normal.

## 2024-01-11 ENCOUNTER — APPOINTMENT (OUTPATIENT)
Dept: RADIOLOGY | Facility: MEDICAL CENTER | Age: 69
End: 2024-01-11
Payer: MEDICARE

## 2024-01-11 DIAGNOSIS — M54.42 ACUTE LEFT-SIDED LOW BACK PAIN WITH LEFT-SIDED SCIATICA: ICD-10-CM

## 2024-01-11 PROCEDURE — 72110 X-RAY EXAM L-2 SPINE 4/>VWS: CPT

## 2024-01-24 ENCOUNTER — TELEPHONE (OUTPATIENT)
Age: 69
End: 2024-01-24

## 2024-01-24 DIAGNOSIS — E11.9 DM TYPE 2, NOT AT GOAL (HCC): Primary | ICD-10-CM

## 2024-01-24 DIAGNOSIS — E11.9 TYPE 2 DIABETES MELLITUS WITHOUT COMPLICATION, WITHOUT LONG-TERM CURRENT USE OF INSULIN (HCC): ICD-10-CM

## 2024-01-24 NOTE — TELEPHONE ENCOUNTER
Pt called stating Dr. Calero wanted him to check with insurance/cost of the following medications    Eugeneuvia - stated very expensive  Farixiga - couldn't get any information    Not sure what Dr. Calero wants to do.    Please advise [phone]  KRISTIAN can speak to his daughter if he is not available.

## 2024-01-24 NOTE — TELEPHONE ENCOUNTER
PA for Canagliflozin 300 MG TABS     Submitted via  []CMM-KEY   [x]Surescripts-Case ID # [id number]  []Faxed to plan   []Other website   []Phone call Case ID #     Office notes sent, clinical questions answered. Awaiting determination

## 2024-01-24 NOTE — TELEPHONE ENCOUNTER
PA for Canagliflozin 300 MG TABS  Denied    Reason:            Message sent to provider pool Yes    Denial letter scanned into Media Yes    Appeal started No

## 2024-01-26 PROBLEM — E11.3291 MILD NONPROLIFERATIVE DIABETIC RETINOPATHY OF RIGHT EYE ASSOCIATED WITH TYPE 2 DIABETES MELLITUS (HCC): Status: ACTIVE | Noted: 2024-01-26

## 2024-03-20 ENCOUNTER — OFFICE VISIT (OUTPATIENT)
Dept: FAMILY MEDICINE CLINIC | Facility: CLINIC | Age: 69
End: 2024-03-20
Payer: MEDICARE

## 2024-03-20 VITALS
RESPIRATION RATE: 16 BRPM | HEART RATE: 88 BPM | DIASTOLIC BLOOD PRESSURE: 76 MMHG | WEIGHT: 219.6 LBS | HEIGHT: 67 IN | OXYGEN SATURATION: 98 % | SYSTOLIC BLOOD PRESSURE: 140 MMHG | TEMPERATURE: 97.4 F | BODY MASS INDEX: 34.47 KG/M2

## 2024-03-20 DIAGNOSIS — E66.09 CLASS 1 OBESITY DUE TO EXCESS CALORIES WITH SERIOUS COMORBIDITY AND BODY MASS INDEX (BMI) OF 33.0 TO 33.9 IN ADULT: ICD-10-CM

## 2024-03-20 DIAGNOSIS — E11.9 DM TYPE 2, NOT AT GOAL (HCC): ICD-10-CM

## 2024-03-20 DIAGNOSIS — N40.0 ENLARGED PROSTATE WITHOUT LOWER URINARY TRACT SYMPTOMS (LUTS): ICD-10-CM

## 2024-03-20 DIAGNOSIS — E78.5 HYPERLIPIDEMIA, UNSPECIFIED HYPERLIPIDEMIA TYPE: ICD-10-CM

## 2024-03-20 DIAGNOSIS — Z00.00 MEDICARE ANNUAL WELLNESS VISIT, SUBSEQUENT: Primary | ICD-10-CM

## 2024-03-20 DIAGNOSIS — M15.9 PRIMARY OSTEOARTHRITIS INVOLVING MULTIPLE JOINTS: ICD-10-CM

## 2024-03-20 DIAGNOSIS — E11.3291 MILD NONPROLIFERATIVE DIABETIC RETINOPATHY OF RIGHT EYE ASSOCIATED WITH TYPE 2 DIABETES MELLITUS, MACULAR EDEMA PRESENCE UNSPECIFIED (HCC): ICD-10-CM

## 2024-03-20 DIAGNOSIS — E55.9 VITAMIN D DEFICIENCY: ICD-10-CM

## 2024-03-20 DIAGNOSIS — I10 ESSENTIAL HYPERTENSION: ICD-10-CM

## 2024-03-20 PROBLEM — E11.42 DIABETIC POLYNEUROPATHY ASSOCIATED WITH TYPE 2 DIABETES MELLITUS (HCC): Status: ACTIVE | Noted: 2024-03-20

## 2024-03-20 LAB — SL AMB POCT HEMOGLOBIN AIC: 7.9 (ref ?–6.5)

## 2024-03-20 PROCEDURE — 99214 OFFICE O/P EST MOD 30 MIN: CPT | Performed by: FAMILY MEDICINE

## 2024-03-20 PROCEDURE — 83036 HEMOGLOBIN GLYCOSYLATED A1C: CPT | Performed by: FAMILY MEDICINE

## 2024-03-20 PROCEDURE — G0439 PPPS, SUBSEQ VISIT: HCPCS | Performed by: FAMILY MEDICINE

## 2024-03-20 NOTE — PROGRESS NOTES
Assessment/Plan:  Patient to continue present treatment.  Instructed to follow a low-fat, low salt and a low sugar/carbohydrate diet more carefully and get regular aerobic exercise walking up to 150 minutes/week as tolerated.  Weight loss encouraged.  Return to the office in 6 months.  DM type 2, not at goal (HCC)  Overall diabetic control improved but not yet at goal with fingerstick hemoglobin A1c at 7.9 today.  Continue present treatment and continue home glucose monitoring.  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, macular edema presence unspecified (HCC)    DM type 2, not at goal (HCC)  -     POCT hemoglobin A1c    Essential hypertension    Hyperlipidemia, unspecified hyperlipidemia type    Primary osteoarthritis involving multiple joints    Class 1 obesity due to excess calories with serious comorbidity and body mass index (BMI) of 33.0 to 33.9 in adult    Enlarged prostate without lower urinary tract symptoms (luts)    Vitamin D deficiency    Other orders  -     aspirin (ECOTRIN LOW STRENGTH) 81 mg EC tablet; Take 1 tablet (81 mg total) by mouth daily          Subjective:      Patient ID: Avel Carter is a 69 y.o. male.    Patient is here for annual Medicare wellness exam and follow-up of chronic conditions.  Patient is been feeling somewhat better overall.  Blood sugars on home glucose monitoring are improving.  Patient is up-to-date on diabetic eye exam and foot exam.  Patient is up-to-date on colonoscopy.  No regular exercise program although patient plans to become more active during the spring and summer doing yard work.    Diabetes  He presents for his follow-up diabetic visit. He has type 2 diabetes mellitus. His disease course has been improving. There are no hypoglycemic associated  "symptoms. Associated symptoms include foot paresthesias. Pertinent negatives for diabetes include no blurred vision, no chest pain, no fatigue, no foot ulcerations, no polydipsia, no polyuria, no visual change, no weakness and no weight loss. There are no hypoglycemic complications. Symptoms are stable. Diabetic complications include peripheral neuropathy and retinopathy. Pertinent negatives for diabetic complications include no CVA, heart disease or nephropathy. Risk factors for coronary artery disease include dyslipidemia, diabetes mellitus, hypertension, male sex, obesity and family history. Current diabetic treatment includes oral agent (triple therapy). He is compliant with treatment all of the time. His weight is stable. He is following a generally healthy diet. He participates in exercise intermittently. His home blood glucose trend is decreasing steadily. His breakfast blood glucose is taken between 7-8 am. His breakfast blood glucose range is generally 130-140 mg/dl. His bedtime blood glucose is taken between 9-10 pm. His bedtime blood glucose range is generally 140-180 mg/dl. An ACE inhibitor/angiotensin II receptor blocker is being taken. He does not see a podiatrist.Eye exam is current.       The following portions of the patient's history were reviewed and updated as appropriate: allergies, current medications, past family history, past medical history, past social history, past surgical history, and problem list.    Review of Systems   Constitutional:  Negative for fatigue and weight loss.   Eyes:  Negative for blurred vision.   Cardiovascular:  Negative for chest pain.   Endocrine: Negative for polydipsia and polyuria.   Neurological:  Negative for weakness.         Objective:      /76   Pulse 88   Temp (!) 97.4 °F (36.3 °C) (Tympanic)   Resp 16   Ht 5' 7\" (1.702 m)   Wt 99.6 kg (219 lb 9.6 oz)   SpO2 98%   BMI 34.39 kg/m²          Physical Exam  Constitutional:       General: He is not " "in acute distress.     Appearance: Normal appearance. He is obese.   HENT:      Head: Normocephalic.      Mouth/Throat:      Mouth: Mucous membranes are moist.   Eyes:      General: No scleral icterus.     Conjunctiva/sclera: Conjunctivae normal.   Neck:      Vascular: No carotid bruit.   Cardiovascular:      Rate and Rhythm: Normal rate and regular rhythm.   Pulmonary:      Effort: Pulmonary effort is normal.      Breath sounds: Normal breath sounds.   Abdominal:      Palpations: Abdomen is soft.      Tenderness: There is no abdominal tenderness.   Musculoskeletal:      Cervical back: Neck supple.      Right lower leg: No edema.      Left lower leg: No edema.   Lymphadenopathy:      Cervical: No cervical adenopathy.   Skin:     General: Skin is warm and dry.   Neurological:      General: No focal deficit present.      Mental Status: He is alert and oriented to person, place, and time.   Psychiatric:         Mood and Affect: Mood normal.         Behavior: Behavior normal.         Thought Content: Thought content normal.         Judgment: Judgment normal.           Answers submitted by the patient for this visit:  Medicare Annual Wellness Visit (Submitted on 3/19/2024)  How would you rate your overall health?: fair  Compared to last year, how is your physical health?: same  In general, how satisfied are you with your life?: satisfied  Compared to last year, how is your eyesight?: same  Compared to last year, how is your hearing?: same  Compared to last year, how is your emotional/mental health?: same  How often is anger a problem for you?: never, rarely  How often do you feel unusually tired/fatigued?: sometimes  In the past 7 days, how much pain have you experienced?: some  If you answered \"some\" or \"a lot\", please rate the severity of your pain on a scale of 1 to 10 (1 being the least severe pain and 10 being the most intense pain).: 4/10  In the past 6 months, have you lost or gained 10 pounds without trying?: " No  One or more falls in the last year: No  Do you have trouble with the stairs inside or outside your home?: No  Does your home have working smoke alarms?: Yes  Does your home have a carbon monoxide monitor?: No  Which safety hazards (if any) have you experienced in your home? Please select all that apply.: none  How would you describe your current diet? Please select all that apply.: Diabetic  In addition to prescription medications, are you taking any over-the-counter supplements?: Yes  If yes, what supplements are you taking?: aspirin  Can you manage your medications?: Yes  Are you currently taking any opioid medications?: No  Can you walk and transfer into and out of your bed and chair?: Yes  Can you dress and groom yourself?: Yes  Can you bathe or shower yourself?: Yes  Can you feed yourself?: Yes  Can you do your laundry/ housekeeping?: Yes  Can you manage your money, pay your bills, and track your expenses?: Yes  Can you make your own meals?: Yes  Can you do your own shopping?: Yes  Within the last 12 months, have you had any hospitalizations or Emergency Department visits?: No  Do you have a living will?: Yes  Do you have a Durable POA (Power of ) for healthcare decisions?: Yes  Do you have an Advanced Directive for end of life decisions?: Yes  How often have you used an illegal drug (including marijuana) or a prescription medication for non-medical reasons in the past year?: never  What is the typical number of drinks you consume in a day?: 0  What is the typical number of drinks you consume in a week?: 0  How often did you have a drink containing alcohol in the past year?: monthly or less  How many drinks did you have on a typical day  when you were drinking in the past year?: 1 to 2  How often did you have 6 or more drinks on one occasion in the past year?: never

## 2024-03-20 NOTE — PATIENT INSTRUCTIONS
Medicare Preventive Visit Patient Instructions  Thank you for completing your Welcome to Medicare Visit or Medicare Annual Wellness Visit today. Your next wellness visit will be due in one year (3/21/2025).  The screening/preventive services that you may require over the next 5-10 years are detailed below. Some tests may not apply to you based off risk factors and/or age. Screening tests ordered at today's visit but not completed yet may show as past due. Also, please note that scanned in results may not display below.  Preventive Screenings:  Service Recommendations Previous Testing/Comments   Colorectal Cancer Screening  Colonoscopy    Fecal Occult Blood Test (FOBT)/Fecal Immunochemical Test (FIT)  Fecal DNA/Cologuard Test  Flexible Sigmoidoscopy Age: 45-75 years old   Colonoscopy: every 10 years (May be performed more frequently if at higher risk)  OR  FOBT/FIT: every 1 year  OR  Cologuard: every 3 years  OR  Sigmoidoscopy: every 5 years  Screening may be recommended earlier than age 45 if at higher risk for colorectal cancer. Also, an individualized decision between you and your healthcare provider will decide whether screening between the ages of 76-85 would be appropriate. Colonoscopy: 09/20/2021  FOBT/FIT: Not on file  Cologuard: 06/02/2021  Sigmoidoscopy: Not on file    Screening Current     Prostate Cancer Screening Individualized decision between patient and health care provider in men between ages of 55-69   Medicare will cover every 12 months beginning on the day after your 50th birthday PSA: 0.30 ng/mL     Screening Current     Hepatitis C Screening Once for adults born between 1945 and 1965  More frequently in patients at high risk for Hepatitis C Hep C Antibody: Not on file        Diabetes Screening 1-2 times per year if you're at risk for diabetes or have pre-diabetes Fasting glucose: 163 mg/dL (7/19/2023)  A1C: 9.3 (1/9/2024)  Screening Not Indicated  History Diabetes   Cholesterol Screening Once  every 5 years if you don't have a lipid disorder. May order more often based on risk factors. Lipid panel: 07/19/2023  Screening Not Indicated  History Lipid Disorder      Other Preventive Screenings Covered by Medicare:  Abdominal Aortic Aneurysm (AAA) Screening: covered once if your at risk. You're considered to be at risk if you have a family history of AAA or a male between the age of 65-75 who smoking at least 100 cigarettes in your lifetime.  Lung Cancer Screening: covers low dose CT scan once per year if you meet all of the following conditions: (1) Age 55-77; (2) No signs or symptoms of lung cancer; (3) Current smoker or have quit smoking within the last 15 years; (4) You have a tobacco smoking history of at least 20 pack years (packs per day x number of years you smoked); (5) You get a written order from a healthcare provider.  Glaucoma Screening: covered annually if you're considered high risk: (1) You have diabetes OR (2) Family history of glaucoma OR (3)  aged 50 and older OR (4)  American aged 65 and older  Osteoporosis Screening: covered every 2 years if you meet one of the following conditions: (1) Have a vertebral abnormality; (2) On glucocorticoid therapy for more than 3 months; (3) Have primary hyperparathyroidism; (4) On osteoporosis medications and need to assess response to drug therapy.  HIV Screening: covered annually if you're between the age of 15-65. Also covered annually if you are younger than 15 and older than 65 with risk factors for HIV infection. For pregnant patients, it is covered up to 3 times per pregnancy.    Immunizations:  Immunization Recommendations   Influenza Vaccine Annual influenza vaccination during flu season is recommended for all persons aged >= 6 months who do not have contraindications   Pneumococcal Vaccine   * Pneumococcal conjugate vaccine = PCV13 (Prevnar 13), PCV15 (Vaxneuvance), PCV20 (Prevnar 20)  * Pneumococcal polysaccharide vaccine  = PPSV23 (Pneumovax) Adults 19-65 yo with certain risk factors or if 65+ yo  If never received any pneumonia vaccine: recommend Prevnar 20 (PCV20)  Give PCV20 if previously received 1 dose of PCV13 or PPSV23   Hepatitis B Vaccine 3 dose series if at intermediate or high risk (ex: diabetes, end stage renal disease, liver disease)   Respiratory syncytial virus (RSV) Vaccine - COVERED BY MEDICARE PART D  * RSVPreF3 (Arexvy) CDC recommends that adults 60 years of age and older may receive a single dose of RSV vaccine using shared clinical decision-making (SCDM)   Tetanus (Td) Vaccine - COST NOT COVERED BY MEDICARE PART B Following completion of primary series, a booster dose should be given every 10 years to maintain immunity against tetanus. Td may also be given as tetanus wound prophylaxis.   Tdap Vaccine - COST NOT COVERED BY MEDICARE PART B Recommended at least once for all adults. For pregnant patients, recommended with each pregnancy.   Shingles Vaccine (Shingrix) - COST NOT COVERED BY MEDICARE PART B  2 shot series recommended in those 19 years and older who have or will have weakened immune systems or those 50 years and older     Health Maintenance Due:      Topic Date Due   • Hepatitis C Screening  Never done   • Colorectal Cancer Screening  09/18/2028     Immunizations Due:      Topic Date Due   • Pneumococcal Vaccine: 65+ Years (1 of 2 - PCV) Never done   • COVID-19 Vaccine (1 - 2023-24 season) Never done     Advance Directives   What are advance directives?  Advance directives are legal documents that state your wishes and plans for medical care. These plans are made ahead of time in case you lose your ability to make decisions for yourself. Advance directives can apply to any medical decision, such as the treatments you want, and if you want to donate organs.   What are the types of advance directives?  There are many types of advance directives, and each state has rules about how to use them. You may  choose a combination of any of the following:  Living will:  This is a written record of the treatment you want. You can also choose which treatments you do not want, which to limit, and which to stop at a certain time. This includes surgery, medicine, IV fluid, and tube feedings.   Durable power of  for healthcare (DPAHC):  This is a written record that states who you want to make healthcare choices for you when you are unable to make them for yourself. This person, called a proxy, is usually a family member or a friend. You may choose more than 1 proxy.  Do not resuscitate (DNR) order:  A DNR order is used in case your heart stops beating or you stop breathing. It is a request not to have certain forms of treatment, such as CPR. A DNR order may be included in other types of advance directives.  Medical directive:  This covers the care that you want if you are in a coma, near death, or unable to make decisions for yourself. You can list the treatments you want for each condition. Treatment may include pain medicine, surgery, blood transfusions, dialysis, IV or tube feedings, and a ventilator (breathing machine).  Values history:  This document has questions about your views, beliefs, and how you feel and think about life. This information can help others choose the care that you would choose.  Why are advance directives important?  An advance directive helps you control your care. Although spoken wishes may be used, it is better to have your wishes written down. Spoken wishes can be misunderstood, or not followed. Treatments may be given even if you do not want them. An advance directive may make it easier for your family to make difficult choices about your care.   Weight Management   Why it is important to manage your weight:  Being overweight increases your risk of health conditions such as heart disease, high blood pressure, type 2 diabetes, and certain types of cancer. It can also increase your risk  for osteoarthritis, sleep apnea, and other respiratory problems. Aim for a slow, steady weight loss. Even a small amount of weight loss can lower your risk of health problems.  How to lose weight safely:  A safe and healthy way to lose weight is to eat fewer calories and get regular exercise. You can lose up about 1 pound a week by decreasing the number of calories you eat by 500 calories each day.   Healthy meal plan for weight management:  A healthy meal plan includes a variety of foods, contains fewer calories, and helps you stay healthy. A healthy meal plan includes the following:  Eat whole-grain foods more often.  A healthy meal plan should contain fiber. Fiber is the part of grains, fruits, and vegetables that is not broken down by your body. Whole-grain foods are healthy and provide extra fiber in your diet. Some examples of whole-grain foods are whole-wheat breads and pastas, oatmeal, brown rice, and bulgur.  Eat a variety of vegetables every day.  Include dark, leafy greens such as spinach, kale, hussain greens, and mustard greens. Eat yellow and orange vegetables such as carrots, sweet potatoes, and winter squash.   Eat a variety of fruits every day.  Choose fresh or canned fruit (canned in its own juice or light syrup) instead of juice. Fruit juice has very little or no fiber.  Eat low-fat dairy foods.  Drink fat-free (skim) milk or 1% milk. Eat fat-free yogurt and low-fat cottage cheese. Try low-fat cheeses such as mozzarella and other reduced-fat cheeses.  Choose meat and other protein foods that are low in fat.  Choose beans or other legumes such as split peas or lentils. Choose fish, skinless poultry (chicken or turkey), or lean cuts of red meat (beef or pork). Before you cook meat or poultry, cut off any visible fat.   Use less fat and oil.  Try baking foods instead of frying them. Add less fat, such as margarine, sour cream, regular salad dressing and mayonnaise to foods. Eat fewer high-fat foods.  Some examples of high-fat foods include french fries, doughnuts, ice cream, and cakes.  Eat fewer sweets.  Limit foods and drinks that are high in sugar. This includes candy, cookies, regular soda, and sweetened drinks.  Exercise:  Exercise at least 30 minutes per day on most days of the week. Some examples of exercise include walking, biking, dancing, and swimming. You can also fit in more physical activity by taking the stairs instead of the elevator or parking farther away from stores. Ask your healthcare provider about the best exercise plan for you.      © Copyright [employer] 2018 Information is for End User's use only and may not be sold, redistributed or otherwise used for commercial purposes. All illustrations and images included in CareNotes® are the copyrighted property of [employer] or [employer]

## 2024-03-20 NOTE — ASSESSMENT & PLAN NOTE
Overall diabetic control improved but not yet at goal with fingerstick hemoglobin A1c at 7.9 today.  Continue present treatment and continue home glucose monitoring.  Lab Results   Component Value Date    HGBA1C 7.9 (A) 03/20/2024

## 2024-03-20 NOTE — PROGRESS NOTES
Assessment and Plan:     Problem List Items Addressed This Visit    None       Preventive health issues were discussed with patient, and age appropriate screening tests were ordered as noted in patient's After Visit Summary.  Personalized health advice and appropriate referrals for health education or preventive services given if needed, as noted in patient's After Visit Summary.     History of Present Illness:     Patient presents for a Medicare Wellness Visit    HPI   Patient Care Team:  Juan Manuel Calero DO as PCP - General     Review of Systems:     Review of Systems     Problem List:     Patient Active Problem List   Diagnosis    Essential hypertension    Bilateral shoulder pain    Closed disp fracture of base of fifth metacarpal bone of right hand    DM type 2, not at goal (HCC)    Enlarged prostate without lower urinary tract symptoms (luts)    Erectile dysfunction of non-organic origin    Hyperlipidemia    Class 1 obesity due to excess calories with serious comorbidity and body mass index (BMI) of 33.0 to 33.9 in adult    Osteoarthritis    Osteoarthrosis of knee    Rhinitis    Vitamin D deficiency    Abnormal gait    Osteoarthritis of knee    History of fracture    Umbilical hernia without obstruction or gangrene    COVID-19    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 associated with type 2 diabetes mellitus (HCC)      Past Medical and Surgical History:     Past Medical History:   Diagnosis Date    Arthritis     Diabetes mellitus (HCC)     Hyperlipidemia     Hypertension     Kidney stone     Otitis media     Umbilical hernia     Wears glasses      Past Surgical History:   Procedure Laterality Date    COLONOSCOPY      FRACTURE SURGERY      right hand and wrist    HERNIA REPAIR      JOINT REPLACEMENT      b/l knee     KNEE SURGERY      DE LITHOTRIPSY XTRCORP SHOCK WAVE Left 08/22/2019    Procedure: ESWL OF RENAL CALCULUS;  Surgeon: Suleiman Baltazar DO;  Location:  MAIN OR;  Service: Urology     [place] RPR UMBILICAL HRNA 5 YRS/> REDUCIBLE N/A 08/05/2021    Procedure: REPAIR HERNIA UMBILICAL;  Surgeon: Stefan Sosa MD;  Location: [facility];  Service: General      Family History:     Family History   Problem Relation Age of Onset    Diabetes Mother     Prostate cancer Father     Other Maternal Uncle         CABG      Social History:     Social History     Socioeconomic History    Marital status: /Civil Union     Spouse name: None    Number of children: None    Years of education: None    Highest education level: None   Occupational History    None   Tobacco Use    Smoking status: Never    Smokeless tobacco: Never   Vaping Use    Vaping status: Never Used   Substance and Sexual Activity    Alcohol use: Yes     Comment: rare    Drug use: No    Sexual activity: None   Other Topics Concern    None   Social History Narrative    Being a social drinker - As per [employer]     Caffeine use     Social Determinants of Health     Financial Resource Strain: Not on file   Food Insecurity: No Food Insecurity (3/19/2024)    Hunger Vital Sign     Worried About Running Out of Food in the Last Year: Never true     Ran Out of Food in the Last Year: Never true   Transportation Needs: No Transportation Needs (3/19/2024)    PRAPARE - Transportation     Lack of Transportation (Medical): No     Lack of Transportation (Non-Medical): No   Physical Activity: Not on file   Stress: Not on file   Social Connections: Not on file   Intimate Partner Violence: Not on file   Housing Stability: Low Risk  (3/19/2024)    Housing Stability Vital Sign     Unable to Pay for Housing in the Last Year: No     Number of Places Lived in the Last Year: 1     Unstable Housing in the Last Year: No      Medications and Allergies:     Current Outpatient Medications   Medication Sig Dispense Refill    amLODIPine (NORVASC) 10 mg tablet TAKE 1 TABLET BY MOUTH EVERY DAY 90 tablet 3    Canagliflozin 300 MG TABS Take 1 tablet (300 mg total) by mouth daily 30  tablet 5    Cholecalciferol (VITAMIN D) 2000 units CAPS Take 1 capsule by mouth daily      finasteride (PROSCAR) 5 mg tablet Take 5 mg by mouth daily      glimepiride (AMARYL) 2 mg tablet Take 1 tablet (2 mg total) by mouth 2 (two) times a day with meals 180 tablet 3    glucose blood (FREESTYLE LITE) test strip USE  TO TEST BLOOD SUGAR ONCE A  each 3    Lancets (FREESTYLE) lancets Use daily      lisinopril-hydrochlorothiazide (PRINZIDE,ZESTORETIC) 20-25 MG per tablet TAKE 1 TABLET BY MOUTH EVERY DAY 90 tablet 3    metFORMIN (GLUCOPHAGE) 500 mg tablet TAKE 2 TABLETS BY MOUTH TWICE A  tablet 3    pravastatin (PRAVACHOL) 80 mg tablet TAKE 1 TABLET BY MOUTH EVERY DAY 90 tablet 3    TiZANidine (ZANAFLEX) 2 MG capsule Take 1 capsule (2 mg total) by mouth 3 (three) times a day as needed for muscle spasms 30 capsule 0    amoxicillin (AMOXIL) 500 mg capsule Take 500 mg by mouth every 24 hours (Patient not taking: Reported on 1/9/2024)      aspirin (ECOTRIN LOW STRENGTH) 81 mg EC tablet Take 1 tablet by mouth daily (Patient not taking: Reported on 1/9/2024)      Empagliflozin (Jardiance) 10 MG TABS tablet Take 1 tablet (10 mg total) by mouth daily (Patient not taking: Reported on 1/9/2024) 90 tablet 1    mupirocin (BACTROBAN) 2 % ointment Apply topically daily (Patient not taking: Reported on 1/9/2024)       No current facility-administered medications for this visit.     Allergies   Allergen Reactions    Naproxen Nausea Only      Immunizations:     Immunization History   Administered Date(s) Administered    INFLUENZA 11/17/2015    Influenza Quadrivalent, 6-35 Months IM 11/17/2015      Health Maintenance:         Topic Date Due    Hepatitis C Screening  Never done    Colorectal Cancer Screening  09/18/2028         Topic Date Due    Pneumococcal Vaccine: 65+ Years (1 of 2 - PCV) Never done    COVID-19 Vaccine (1 - 2023-24 season) Never done      Medicare Screening Tests and Risk Assessments:     Avel is here  for his Subsequent Wellness visit. Last Medicare Wellness visit information reviewed, patient interviewed and updates made to the record today.      Health Risk Assessment:   Patient rates overall health as fair. Patient feels that their physical health rating is same. Patient is satisfied with their life. Eyesight was rated as same. Hearing was rated as same. Patient feels that their emotional and mental health rating is same. Patients states they are never, rarely angry. Patient states they are sometimes unusually tired/fatigued. Pain experienced in the last 7 days has been some. Patient's pain rating has been 4/10. Patient states that he has experienced no weight loss or gain in last 6 months.     Fall Risk Screening:   In the past year, patient has experienced: no history of falling in past year      Home Safety:  Patient does not have trouble with stairs inside or outside of their home. Patient has working smoke alarms and has no working carbon monoxide detector. Home safety hazards include: none.     Nutrition:   Current diet is Diabetic.     Medications:   Patient is currently taking over-the-counter supplements. OTC medications include: see medication list. Patient is able to manage medications.     Activities of Daily Living (ADLs)/Instrumental Activities of Daily Living (IADLs):   Walk and transfer into and out of bed and chair?: Yes  Dress and groom yourself?: Yes    Bathe or shower yourself?: Yes    Feed yourself? Yes  Do your laundry/housekeeping?: Yes  Manage your money, pay your bills and track your expenses?: Yes  Make your own meals?: Yes    Do your own shopping?: Yes    Previous Hospitalizations:   Any hospitalizations or ED visits within the last 12 months?: No      Advance Care Planning:   Living will: Yes    Durable POA for healthcare: Yes    Advanced directive: Yes      Cognitive Screening:   Provider or family/friend/caregiver concerned regarding cognition?: No    PREVENTIVE SCREENINGS       Cardiovascular Screening:    General: Screening Not Indicated, History Lipid Disorder and Risks and Benefits Discussed      Diabetes Screening:     General: Screening Not Indicated, History Diabetes and Risks and Benefits Discussed      Colorectal Cancer Screening:     General: Screening Current      Prostate Cancer Screening:    General: Screening Current and Risks and Benefits Discussed      Osteoporosis Screening:    General: Risks and Benefits Discussed and Screening Not Indicated      Abdominal Aortic Aneurysm (AAA) Screening:    Risk factors include: age between 65-74 yo        General: Risks and Benefits Discussed and Screening Not Indicated      Lung Cancer Screening:     General: Screening Not Indicated and Risks and Benefits Discussed      Hepatitis C Screening:    General: Risks and Benefits Discussed and Patient Declines    Screening, Brief Intervention, and Referral to Treatment (SBIRT)    Screening  Typical number of drinks in a day: 0  Typical number of drinks in a week: 0  Interpretation: Low risk drinking behavior.    AUDIT-C Screenin) How often did you have a drink containing alcohol in the past year? monthly or less  2) How many drinks did you have on a typical day when you were drinking in the past year? 1 to 2  3) How often did you have 6 or more drinks on one occasion in the past year? never    AUDIT-C Score: 1  Interpretation: Score 0-3 (male): Negative screen for alcohol misuse    Single Item Drug Screening:  How often have you used an illegal drug (including marijuana) or a prescription medication for non-medical reasons in the past year? never    Single Item Drug Screen Score: 0  Interpretation: Negative screen for possible drug use disorder    Brief Intervention  Alcohol & drug use screenings were reviewed. No concerns regarding substance use disorder identified.     Other Counseling Topics:   Car/seat belt/driving safety, skin self-exam, sunscreen and calcium and vitamin D intake and  "regular weightbearing exercise.     No results found.     Physical Exam:     Ht 5' 7\" (1.702 m)   BMI 34.36 kg/m²     Physical Exam     Juan Manuel Calero, DO  "

## 2024-04-29 DIAGNOSIS — M54.42 ACUTE LEFT-SIDED LOW BACK PAIN WITH LEFT-SIDED SCIATICA: ICD-10-CM

## 2024-04-29 RX ORDER — TIZANIDINE HYDROCHLORIDE 2 MG/1
2 CAPSULE, GELATIN COATED ORAL 3 TIMES DAILY PRN
Qty: 30 CAPSULE | Refills: 0 | Status: SHIPPED | OUTPATIENT
Start: 2024-04-29

## 2024-04-29 NOTE — TELEPHONE ENCOUNTER
Reason for call:   [x] Refill   [] Prior Auth  [] Other:     Office:   [x] PCP/Provider - Juan Manuel Calero DO   [] Specialty/Provider -     Medication: TiZANidine (ZANAFLEX) 2 MG capsule     Dose/Frequency: Take 1 capsule (2 mg total) by mouth 3 (three) times a day as needed for muscle spasms     Quantity: 30    [id number] [id number]     Does the patient have enough for 3 days?   [] Yes   [x] No - Send as HP to POD

## 2024-06-07 ENCOUNTER — NURSE TRIAGE (OUTPATIENT)
Age: 69
End: 2024-06-07

## 2024-06-07 NOTE — TELEPHONE ENCOUNTER
Spoke with pt left him know he would need to be seen in order to get medication pt stated he will see how it goes over the weekend and call us back Monday if its not any better

## 2024-06-07 NOTE — TELEPHONE ENCOUNTER
"Patient contracted pink eye from a family member and would like to know if he could have drops sent to his pharmacy. He has a 3D printer running for the next couple hours and is unable to come into the office. Patient would like a call back to advise.     Reason for Disposition   Eye with yellow/green discharge or eyelashes stick together, but NO standing order to call in antibiotic eye drops    Answer Assessment - Initial Assessment Questions  1. EYE DISCHARGE: \"Is the discharge in one or both eyes?\" \"What color is it?\" \"How much is there?\" \"When did the discharge start?\"       Both, unaware, moderate, 2 days ago  2. REDNESS OF SCLERA: \"Is the redness in one or both eyes?\" \"When did the redness start?\"       Yes  3. EYELIDS: \"Are the eyelids red or swollen?\" If Yes, ask: \"How much?\"       Denies   4. VISION: \"Is there any difficulty seeing clearly?\"       Denies   5. PAIN: \"Is there any pain? If Yes, ask: \"How bad is it?\" (Scale 1-10; or mild, moderate, severe)     - MILD (1-3): doesn't interfere with normal activities      - MODERATE (4-7): interferes with normal activities or awakens from sleep     - SEVERE (8-10): excruciating pain, unable to do any normal activities        Mild  6. CONTACT LENS: \"Do you wear contacts?\"      Denies   7. OTHER SYMPTOMS: \"Do you have any other symptoms?\" (e.g., fever, runny nose, cough)      Cough, scratchy throat   8. PREGNANCY: \"Is there any chance you are pregnant?\" \"When was your last menstrual period?\"      N/A    Protocols used: Eye - Pus or Discharge-ADULT-[place]    "

## 2024-06-09 ENCOUNTER — OFFICE VISIT (OUTPATIENT)
Dept: URGENT CARE | Facility: MEDICAL CENTER | Age: 69
End: 2024-06-09
Payer: MEDICARE

## 2024-06-09 VITALS
DIASTOLIC BLOOD PRESSURE: 91 MMHG | RESPIRATION RATE: 18 BRPM | TEMPERATURE: 98.5 F | OXYGEN SATURATION: 99 % | SYSTOLIC BLOOD PRESSURE: 176 MMHG | HEART RATE: 70 BPM

## 2024-06-09 DIAGNOSIS — H10.9 CONJUNCTIVITIS OF BOTH EYES, UNSPECIFIED CONJUNCTIVITIS TYPE: ICD-10-CM

## 2024-06-09 DIAGNOSIS — R05.1 ACUTE COUGH: ICD-10-CM

## 2024-06-09 DIAGNOSIS — J20.9 ACUTE BRONCHITIS, UNSPECIFIED ORGANISM: Primary | ICD-10-CM

## 2024-06-09 PROCEDURE — G0463 HOSPITAL OUTPT CLINIC VISIT: HCPCS | Performed by: NURSE PRACTITIONER

## 2024-06-09 PROCEDURE — 99213 OFFICE O/P EST LOW 20 MIN: CPT | Performed by: NURSE PRACTITIONER

## 2024-06-09 RX ORDER — AZITHROMYCIN 250 MG/1
TABLET, FILM COATED ORAL
Qty: 6 TABLET | Refills: 0 | Status: SHIPPED | OUTPATIENT
Start: 2024-06-09 | End: 2024-06-13

## 2024-06-09 NOTE — PROGRESS NOTES
"  [facility]        NAME: Avel Carter is a 69 y.o. male  : 1955    MRN: [id number]  DATE: 2024  TIME: 8:46 AM    Assessment and Plan   Acute bronchitis, unspecified organism [J20.9]  1. Acute bronchitis, unspecified organism  azithromycin (ZITHROMAX) 250 mg tablet      2. Conjunctivitis of both eyes, unspecified conjunctivitis type  ciprofloxacin (CILOXAN) 0.3 % ophthalmic ointment      3. Acute cough              Patient Instructions   Start azithromycin, take 2 tablets today and then one daily until gone.  Start ciprofloxacin, 1 drop in each eye TID  Purchase OTC cough medicine that says safe for patients with high blood pressure  Take BP meds as soon as you get home and monitor BP.  Call PCP with BP readings.  Increase fluids    Follow up with PCP in 3-5 days.  Proceed to  ER if symptoms worsen.    If tests are performed, our office will contact you with results only if changes need to made to the care plan discussed with you at the visit. You can review your full results on [facility] Mychart.    Chief Complaint     Chief Complaint   Patient presents with    Cold Like Symptoms     Patient c/o cough with chest congestion , eye redness and discharge x 1 week          History of Present Illness       HPI  Has been ill since early last week.  Complaining of productive cough, sore throat and congestion.  Both eyes are red and were crusted shut when he woke.  Denies fevers, ear pain, CP and SOB.  Other family members in the home are also ill.    Has not taken BP meds today.  First BP reading was 179/91.  Second manual reading was 148/92 which patient says is \"good for me.\"  Will take meds as soon as he gets home.    Review of Systems   Review of Systems   HENT:  Positive for congestion and sore throat. Negative for ear discharge and ear pain.    Eyes:  Positive for discharge, redness and itching.   Respiratory:  Positive for cough. Negative for shortness of breath.    Cardiovascular:  " Negative for chest pain.   All other systems reviewed and are negative.    Current Medications       Current Outpatient Medications:     azithromycin (ZITHROMAX) 250 mg tablet, Take 2 tablets today then 1 tablet daily x 4 days, Disp: 6 tablet, Rfl: 0    ciprofloxacin (CILOXAN) 0.3 % ophthalmic ointment, Administer to both eyes 3 (three) times a day, Disp: 3.5 g, Rfl: 0    amLODIPine (NORVASC) 10 mg tablet, TAKE 1 TABLET BY MOUTH EVERY DAY, Disp: 90 tablet, Rfl: 3    aspirin (ECOTRIN LOW STRENGTH) 81 mg EC tablet, Take 1 tablet (81 mg total) by mouth daily, Disp: 30 tablet, Rfl: 0    Cholecalciferol (VITAMIN D) 2000 units CAPS, Take 1 capsule by mouth daily, Disp: , Rfl:     finasteride (PROSCAR) 5 mg tablet, Take 5 mg by mouth daily, Disp: , Rfl:     glimepiride (AMARYL) 2 mg tablet, Take 1 tablet (2 mg total) by mouth 2 (two) times a day with meals, Disp: 180 tablet, Rfl: 3    glucose blood (FREESTYLE LITE) test strip, USE  TO TEST BLOOD SUGAR ONCE A DAY, Disp: 100 each, Rfl: 3    Lancets (FREESTYLE) lancets, Use daily, Disp: , Rfl:     lisinopril-hydrochlorothiazide (PRINZIDE,ZESTORETIC) 20-25 MG per tablet, TAKE 1 TABLET BY MOUTH EVERY DAY, Disp: 90 tablet, Rfl: 3    metFORMIN (GLUCOPHAGE) 500 mg tablet, TAKE 2 TABLETS BY MOUTH TWICE A DAY, Disp: 360 tablet, Rfl: 3    mupirocin (BACTROBAN) 2 % ointment, Apply topically daily (Patient not taking: Reported on 1/9/2024), Disp: , Rfl:     pravastatin (PRAVACHOL) 80 mg tablet, TAKE 1 TABLET BY MOUTH EVERY DAY, Disp: 90 tablet, Rfl: 3    TiZANidine (ZANAFLEX) 2 MG capsule, Take 1 capsule (2 mg total) by mouth 3 (three) times a day as needed for muscle spasms, Disp: 30 capsule, Rfl: 0    Current Allergies     Allergies as of 06/09/2024 - Reviewed 06/09/2024   Allergen Reaction Noted    Naproxen Nausea Only 04/25/2019            The following portions of the patient's history were reviewed and updated as appropriate: allergies, current medications, past family history,  past medical history, past social history, past surgical history and problem list.     Past Medical History:   Diagnosis Date    Arthritis     Diabetes mellitus (HCC)     Hyperlipidemia     Hypertension     Kidney stone     Otitis media     Umbilical hernia     Wears glasses        Past Surgical History:   Procedure Laterality Date    COLONOSCOPY      FRACTURE SURGERY      right hand and wrist    HERNIA REPAIR      JOINT REPLACEMENT      b/l knee     KNEE SURGERY      [place] LITHOTRIPSY XTRCORP SHOCK WAVE Left 08/22/2019    Procedure: ESWL OF RENAL CALCULUS;  Surgeon: Suleiman Baltazar DO;  Location:  MAIN OR;  Service: Urology    [place] RPR UMBILICAL HRNA 5 YRS/> REDUCIBLE N/A 08/05/2021    Procedure: REPAIR HERNIA UMBILICAL;  Surgeon: Stefan Sosa MD;  Location: [facility];  Service: General       Family History   Problem Relation Age of Onset    Diabetes Mother     Prostate cancer Father     Other Maternal Uncle         CABG         Medications have been verified.        Objective   BP (!) 176/91   Pulse 70   Temp 98.5 °F (36.9 °C)   Resp 18   SpO2 99%        Physical Exam     Physical Exam  Vitals and nursing note reviewed.   Constitutional:       General: He is not in acute distress.     Appearance: Normal appearance. He is ill-appearing.   HENT:      Right Ear: Tympanic membrane, ear canal and external ear normal.      Left Ear: Tympanic membrane, ear canal and external ear normal.      Nose: Congestion present.      Mouth/Throat:      Mouth: Mucous membranes are moist.      Pharynx: Posterior oropharyngeal erythema present. No oropharyngeal exudate.   Eyes:      General: Lids are normal.         Right eye: Discharge present.         Left eye: Discharge present.     Extraocular Movements: Extraocular movements intact.      Conjunctiva/sclera:      Right eye: Right conjunctiva is injected.      Left eye: Left conjunctiva is injected.      Pupils: Pupils are equal, round, and reactive to light.   Cardiovascular:       Rate and Rhythm: Normal rate and regular rhythm.      Heart sounds: Normal heart sounds.   Pulmonary:      Effort: Pulmonary effort is normal. No respiratory distress.      Breath sounds: Normal breath sounds. No wheezing, rhonchi or rales.   Abdominal:      General: Abdomen is flat. Bowel sounds are normal. There is no distension.      Palpations: Abdomen is soft.      Tenderness: There is no abdominal tenderness. There is no guarding.   Musculoskeletal:      Cervical back: Normal range of motion.   Lymphadenopathy:      Cervical: No cervical adenopathy.   Skin:     General: Skin is warm and dry.   Neurological:      Mental Status: He is alert and oriented to person, place, and time.   Psychiatric:         Mood and Affect: Mood normal.         Behavior: Behavior normal. Behavior is cooperative.

## 2024-06-09 NOTE — PATIENT INSTRUCTIONS
Start azithromycin, take 2 tablets today and then one daily until gone.  Start ciprofloxacin, 1 drop in each eye TID  Purchase OTC cough medicine that says safe for patients with high blood pressure  Take BP meds as soon as you get home and monitor BP.  Call PCP with BP readings.  Increase fluids      Follow up with PCP in 3-5 days.  Proceed to  ER if symptoms worsen.

## 2024-07-03 ENCOUNTER — OFFICE VISIT (OUTPATIENT)
Dept: FAMILY MEDICINE CLINIC | Facility: CLINIC | Age: 69
End: 2024-07-03
Payer: MEDICARE

## 2024-07-03 VITALS
OXYGEN SATURATION: 95 % | SYSTOLIC BLOOD PRESSURE: 134 MMHG | DIASTOLIC BLOOD PRESSURE: 76 MMHG | TEMPERATURE: 97.8 F | HEIGHT: 67 IN | RESPIRATION RATE: 16 BRPM | HEART RATE: 92 BPM | BODY MASS INDEX: 34.15 KG/M2 | WEIGHT: 217.6 LBS

## 2024-07-03 DIAGNOSIS — J40 BRONCHITIS: Primary | ICD-10-CM

## 2024-07-03 PROCEDURE — 99213 OFFICE O/P EST LOW 20 MIN: CPT | Performed by: FAMILY MEDICINE

## 2024-07-03 PROCEDURE — G2211 COMPLEX E/M VISIT ADD ON: HCPCS | Performed by: FAMILY MEDICINE

## 2024-07-03 RX ORDER — AMOXICILLIN AND CLAVULANATE POTASSIUM 875; 125 MG/1; MG/1
1 TABLET, FILM COATED ORAL EVERY 12 HOURS SCHEDULED
Qty: 20 TABLET | Refills: 0 | Status: SHIPPED | OUTPATIENT
Start: 2024-07-03 | End: 2024-07-13

## 2024-07-03 RX ORDER — BROMPHENIRAMINE MALEATE, PSEUDOEPHEDRINE HYDROCHLORIDE, AND DEXTROMETHORPHAN HYDROBROMIDE 2; 30; 10 MG/5ML; MG/5ML; MG/5ML
5 SYRUP ORAL 4 TIMES DAILY PRN
Qty: 120 ML | Refills: 0 | Status: SHIPPED | OUTPATIENT
Start: 2024-07-03

## 2024-07-03 NOTE — PROGRESS NOTES
Assessment/Plan:   Patient restarted on Augmentin 875 mg 1 twice daily with food and Bromfed dm as needed.  Recommend increase fluids and rest.  Return to the office in 1 week or call sooner as needed.  If symptoms not improved discussed chest x-ray.   Diagnoses and all orders for this visit:    Bronchitis  -     amoxicillin-clavulanate (AUGMENTIN) 875-125 mg per tablet; Take 1 tablet by mouth every 12 (twelve) hours for 10 days  -     brompheniramine-pseudoephedrine-DM 30-2-10 MG/5ML syrup; Take 5 mL by mouth 4 (four) times a day as needed for allergies    Other orders  -     Cancel: POCT hemoglobin A1c          Subjective:     Patient ID: Avel Carter is a 69 y.o. male.    Patient complains of a recurrent cough productive of yellowish mucus.  He was seen at urgent care 4 weeks ago and treated with Z-Reese with some improvement although symptoms have returned.  Patient denies fever, chills or sweats.  He denies shortness of breath or wheezing.    Cough  This is a recurrent problem. The current episode started 1 to 4 weeks ago. The problem has been gradually worsening. The cough is Productive of purulent sputum. Associated symptoms include nasal congestion, postnasal drip and a sore throat. Pertinent negatives include no chest pain, chills, ear pain, fever, headaches, hemoptysis, myalgias, shortness of breath, sweats or wheezing. He has tried OTC cough suppressant (zpak) for the symptoms. The treatment provided moderate relief. His past medical history is significant for bronchitis. There is no history of asthma, COPD or pneumonia.       Review of Systems   Constitutional:  Negative for chills and fever.   HENT:  Positive for postnasal drip and sore throat. Negative for ear pain.    Respiratory:  Positive for cough. Negative for hemoptysis, shortness of breath and wheezing.    Cardiovascular:  Negative for chest pain.   Musculoskeletal:  Negative for myalgias.   Neurological:  Negative for headaches.          Objective:     Physical Exam  Constitutional:       General: He is not in acute distress.     Appearance: Normal appearance. He is not ill-appearing, toxic-appearing or diaphoretic.   HENT:      Head: Normocephalic.      Right Ear: Tympanic membrane normal.      Left Ear: Tympanic membrane normal.      Nose: Congestion present.      Mouth/Throat:      Mouth: Mucous membranes are moist.      Pharynx: Oropharynx is clear.   Eyes:      General: No scleral icterus.     Conjunctiva/sclera: Conjunctivae normal.   Cardiovascular:      Rate and Rhythm: Normal rate and regular rhythm.   Pulmonary:      Effort: Pulmonary effort is normal. No respiratory distress.      Breath sounds: Normal breath sounds. No wheezing.   Abdominal:      Palpations: Abdomen is soft.      Tenderness: There is no abdominal tenderness.   Musculoskeletal:      Cervical back: Neck supple.      Right lower leg: No edema.      Left lower leg: No edema.   Lymphadenopathy:      Cervical: No cervical adenopathy.   Skin:     General: Skin is warm and dry.   Neurological:      General: No focal deficit present.      Mental Status: He is alert and oriented to person, place, and time.   Psychiatric:         Mood and Affect: Mood normal.         Behavior: Behavior normal.         Thought Content: Thought content normal.         Judgment: Judgment normal.

## 2024-07-23 ENCOUNTER — APPOINTMENT (OUTPATIENT)
Dept: RADIOLOGY | Facility: MEDICAL CENTER | Age: 69
End: 2024-07-23
Payer: MEDICARE

## 2024-07-23 ENCOUNTER — OFFICE VISIT (OUTPATIENT)
Dept: FAMILY MEDICINE CLINIC | Facility: CLINIC | Age: 69
End: 2024-07-23
Payer: MEDICARE

## 2024-07-23 VITALS
SYSTOLIC BLOOD PRESSURE: 140 MMHG | HEIGHT: 67 IN | HEART RATE: 92 BPM | WEIGHT: 219.6 LBS | DIASTOLIC BLOOD PRESSURE: 88 MMHG | BODY MASS INDEX: 34.47 KG/M2 | RESPIRATION RATE: 16 BRPM | TEMPERATURE: 97.1 F | OXYGEN SATURATION: 98 %

## 2024-07-23 DIAGNOSIS — R09.A2 GLOBUS SENSATION: ICD-10-CM

## 2024-07-23 DIAGNOSIS — J02.9 SORE THROAT: ICD-10-CM

## 2024-07-23 DIAGNOSIS — R05.2 SUBACUTE COUGH: Primary | ICD-10-CM

## 2024-07-23 DIAGNOSIS — R05.2 SUBACUTE COUGH: ICD-10-CM

## 2024-07-23 PROCEDURE — G2211 COMPLEX E/M VISIT ADD ON: HCPCS | Performed by: FAMILY MEDICINE

## 2024-07-23 PROCEDURE — 71046 X-RAY EXAM CHEST 2 VIEWS: CPT

## 2024-07-23 PROCEDURE — 99214 OFFICE O/P EST MOD 30 MIN: CPT | Performed by: FAMILY MEDICINE

## 2024-07-23 NOTE — PROGRESS NOTES
Assessment/Plan:   Discussed diagnostic and treatment options.  Patient is being sent for chest x-ray PA and lateral.  Patient to schedule upper GI.  Will heed results.  Patient is being referred to Dr. Patel, ENT for further evaluation and treatment.  Return to the office in 1 month.   Diagnoses and all orders for this visit:    Subacute cough  -     XR chest pa & lateral; Future  -     FL upper GI UGI; Future  -     Ambulatory Referral to Otolaryngology; Future    Sore throat  -     FL upper GI UGI; Future  -     Ambulatory Referral to Otolaryngology; Future    Globus sensation  -     FL upper GI UGI; Future  -     Ambulatory Referral to Otolaryngology; Future          Subjective:     Patient ID: Avel Carter is a 69 y.o. male.    Patient complains of ongoing sore throat and cough for over a month.  He has been treated with 2 courses of antibiotics without significant improvement.  Patient admits to globus sensation feeling as though something is caught in his throat.  He denies heartburn or reflux symptoms.    Sore Throat   This is a chronic problem. The current episode started more than 1 month ago. The problem has been unchanged. There has been no fever. Associated symptoms include congestion, coughing and a hoarse voice. Pertinent negatives include no abdominal pain, ear pain, headaches, neck pain, shortness of breath, swollen glands, trouble swallowing or vomiting. He has had no exposure to strep or mono. Treatments tried: antibiotics. The treatment provided mild relief.   Cough  This is a chronic problem. The current episode started more than 1 month ago. The problem has been unchanged. The cough is Productive of sputum. Associated symptoms include nasal congestion, postnasal drip and a sore throat. Pertinent negatives include no chest pain, chills, ear pain, fever, headaches, heartburn, hemoptysis, rhinorrhea, shortness of breath, sweats or wheezing. He has tried prescription cough suppressant for the  symptoms. The treatment provided mild relief. His past medical history is significant for bronchitis. There is no history of asthma, COPD or pneumonia.       Review of Systems   Constitutional:  Negative for chills and fever.   HENT:  Positive for congestion, hoarse voice, postnasal drip and sore throat. Negative for ear pain, rhinorrhea and trouble swallowing.    Respiratory:  Positive for cough. Negative for hemoptysis, shortness of breath and wheezing.    Cardiovascular:  Negative for chest pain.   Gastrointestinal:  Negative for abdominal pain, heartburn and vomiting.   Musculoskeletal:  Negative for neck pain.   Neurological:  Negative for headaches.         Objective:     Physical Exam  Constitutional:       General: He is not in acute distress.     Appearance: Normal appearance. He is not ill-appearing, toxic-appearing or diaphoretic.   HENT:      Head: Normocephalic.      Right Ear: Tympanic membrane normal.      Left Ear: Tympanic membrane normal.      Nose: Nose normal.      Mouth/Throat:      Mouth: Mucous membranes are moist.      Pharynx: Oropharynx is clear.   Eyes:      General: No scleral icterus.     Conjunctiva/sclera: Conjunctivae normal.   Cardiovascular:      Rate and Rhythm: Normal rate and regular rhythm.   Pulmonary:      Effort: Pulmonary effort is normal. No respiratory distress.      Breath sounds: Normal breath sounds. No wheezing.   Abdominal:      Palpations: Abdomen is soft.      Tenderness: There is no abdominal tenderness.   Musculoskeletal:      Cervical back: Neck supple.      Right lower leg: No edema.      Left lower leg: No edema.   Lymphadenopathy:      Cervical: No cervical adenopathy.   Skin:     General: Skin is warm and dry.   Neurological:      General: No focal deficit present.      Mental Status: He is alert and oriented to person, place, and time.   Psychiatric:         Mood and Affect: Mood normal.         Behavior: Behavior normal.         Thought Content: Thought  content normal.         Judgment: Judgment normal.

## 2024-09-07 DIAGNOSIS — E78.5 HYPERLIPIDEMIA, UNSPECIFIED HYPERLIPIDEMIA TYPE: ICD-10-CM

## 2024-09-07 DIAGNOSIS — E11.9 TYPE 2 DIABETES MELLITUS WITHOUT COMPLICATION, WITHOUT LONG-TERM CURRENT USE OF INSULIN (HCC): ICD-10-CM

## 2024-09-07 DIAGNOSIS — I10 ESSENTIAL HYPERTENSION, BENIGN: ICD-10-CM

## 2024-09-08 RX ORDER — AMLODIPINE BESYLATE 10 MG/1
10 TABLET ORAL DAILY
Qty: 90 TABLET | Refills: 3 | Status: SHIPPED | OUTPATIENT
Start: 2024-09-08 | End: 2024-09-11 | Stop reason: SDUPTHER

## 2024-09-08 RX ORDER — PRAVASTATIN SODIUM 80 MG/1
80 TABLET ORAL DAILY
Qty: 90 TABLET | Refills: 0 | Status: SHIPPED | OUTPATIENT
Start: 2024-09-08 | End: 2024-09-11 | Stop reason: SDUPTHER

## 2024-09-08 RX ORDER — LISINOPRIL AND HYDROCHLOROTHIAZIDE 20; 25 MG/1; MG/1
1 TABLET ORAL DAILY
Qty: 90 TABLET | Refills: 0 | Status: SHIPPED | OUTPATIENT
Start: 2024-09-08 | End: 2024-09-11 | Stop reason: SDUPTHER

## 2024-09-11 ENCOUNTER — APPOINTMENT (OUTPATIENT)
Dept: LAB | Facility: CLINIC | Age: 69
End: 2024-09-11
Payer: MEDICARE

## 2024-09-11 ENCOUNTER — OFFICE VISIT (OUTPATIENT)
Dept: FAMILY MEDICINE CLINIC | Facility: CLINIC | Age: 69
End: 2024-09-11
Payer: MEDICARE

## 2024-09-11 VITALS
HEIGHT: 67 IN | SYSTOLIC BLOOD PRESSURE: 148 MMHG | TEMPERATURE: 96.7 F | DIASTOLIC BLOOD PRESSURE: 90 MMHG | WEIGHT: 218 LBS | BODY MASS INDEX: 34.21 KG/M2 | OXYGEN SATURATION: 98 % | RESPIRATION RATE: 16 BRPM | HEART RATE: 80 BPM

## 2024-09-11 DIAGNOSIS — M15.9 PRIMARY OSTEOARTHRITIS INVOLVING MULTIPLE JOINTS: ICD-10-CM

## 2024-09-11 DIAGNOSIS — E66.09 CLASS 1 OBESITY DUE TO EXCESS CALORIES WITH SERIOUS COMORBIDITY AND BODY MASS INDEX (BMI) OF 33.0 TO 33.9 IN ADULT: ICD-10-CM

## 2024-09-11 DIAGNOSIS — E55.9 VITAMIN D DEFICIENCY: ICD-10-CM

## 2024-09-11 DIAGNOSIS — E78.5 HYPERLIPIDEMIA, UNSPECIFIED HYPERLIPIDEMIA TYPE: ICD-10-CM

## 2024-09-11 DIAGNOSIS — I10 ESSENTIAL HYPERTENSION, BENIGN: ICD-10-CM

## 2024-09-11 DIAGNOSIS — Z12.5 SCREENING PSA (PROSTATE SPECIFIC ANTIGEN): ICD-10-CM

## 2024-09-11 DIAGNOSIS — E11.9 TYPE 2 DIABETES MELLITUS WITHOUT COMPLICATION, WITHOUT LONG-TERM CURRENT USE OF INSULIN (HCC): ICD-10-CM

## 2024-09-11 DIAGNOSIS — I10 ESSENTIAL HYPERTENSION: ICD-10-CM

## 2024-09-11 DIAGNOSIS — E11.9 DM TYPE 2, NOT AT GOAL (HCC): ICD-10-CM

## 2024-09-11 DIAGNOSIS — E11.9 DM TYPE 2, NOT AT GOAL (HCC): Primary | ICD-10-CM

## 2024-09-11 DIAGNOSIS — N40.0 ENLARGED PROSTATE WITHOUT LOWER URINARY TRACT SYMPTOMS (LUTS): ICD-10-CM

## 2024-09-11 LAB
25(OH)D3 SERPL-MCNC: 49.7 NG/ML (ref 30–100)
ALBUMIN SERPL BCG-MCNC: 4.5 G/DL (ref 3.5–5)
ALP SERPL-CCNC: 60 U/L (ref 34–104)
ALT SERPL W P-5'-P-CCNC: 45 U/L (ref 7–52)
ANION GAP SERPL CALCULATED.3IONS-SCNC: 13 MMOL/L (ref 4–13)
AST SERPL W P-5'-P-CCNC: 25 U/L (ref 13–39)
BILIRUB SERPL-MCNC: 0.5 MG/DL (ref 0.2–1)
BUN SERPL-MCNC: 24 MG/DL (ref 5–25)
CALCIUM SERPL-MCNC: 9.7 MG/DL (ref 8.4–10.2)
CHLORIDE SERPL-SCNC: 101 MMOL/L (ref 96–108)
CHOLEST SERPL-MCNC: 158 MG/DL
CO2 SERPL-SCNC: 24 MMOL/L (ref 21–32)
CREAT SERPL-MCNC: 0.8 MG/DL (ref 0.6–1.3)
ERYTHROCYTE [DISTWIDTH] IN BLOOD BY AUTOMATED COUNT: 13 % (ref 11.6–15.1)
GFR SERPL CREATININE-BSD FRML MDRD: 91 ML/MIN/1.73SQ M
GLUCOSE P FAST SERPL-MCNC: 123 MG/DL (ref 65–99)
HCT VFR BLD AUTO: 45.2 % (ref 36.5–49.3)
HDLC SERPL-MCNC: 54 MG/DL
HGB BLD-MCNC: 14.8 G/DL (ref 12–17)
LDLC SERPL CALC-MCNC: 79 MG/DL (ref 0–100)
MCH RBC QN AUTO: 29.3 PG (ref 26.8–34.3)
MCHC RBC AUTO-ENTMCNC: 32.7 G/DL (ref 31.4–37.4)
MCV RBC AUTO: 90 FL (ref 82–98)
PLATELET # BLD AUTO: 277 THOUSANDS/UL (ref 149–390)
PMV BLD AUTO: 11.3 FL (ref 8.9–12.7)
POTASSIUM SERPL-SCNC: 3.7 MMOL/L (ref 3.5–5.3)
PROT SERPL-MCNC: 7.5 G/DL (ref 6.4–8.4)
PSA SERPL-MCNC: 0.14 NG/ML (ref 0–4)
RBC # BLD AUTO: 5.05 MILLION/UL (ref 3.88–5.62)
SL AMB POCT HEMOGLOBIN AIC: 7.8 (ref ?–6.5)
SODIUM SERPL-SCNC: 138 MMOL/L (ref 135–147)
TRIGL SERPL-MCNC: 125 MG/DL
TSH SERPL DL<=0.05 MIU/L-ACNC: 1.46 UIU/ML (ref 0.45–4.5)
WBC # BLD AUTO: 8.06 THOUSAND/UL (ref 4.31–10.16)

## 2024-09-11 PROCEDURE — 83036 HEMOGLOBIN GLYCOSYLATED A1C: CPT | Performed by: FAMILY MEDICINE

## 2024-09-11 PROCEDURE — 80061 LIPID PANEL: CPT

## 2024-09-11 PROCEDURE — 82306 VITAMIN D 25 HYDROXY: CPT

## 2024-09-11 PROCEDURE — 84443 ASSAY THYROID STIM HORMONE: CPT

## 2024-09-11 PROCEDURE — 85027 COMPLETE CBC AUTOMATED: CPT

## 2024-09-11 PROCEDURE — G0103 PSA SCREENING: HCPCS

## 2024-09-11 PROCEDURE — 99214 OFFICE O/P EST MOD 30 MIN: CPT | Performed by: FAMILY MEDICINE

## 2024-09-11 PROCEDURE — 36415 COLL VENOUS BLD VENIPUNCTURE: CPT

## 2024-09-11 PROCEDURE — 80053 COMPREHEN METABOLIC PANEL: CPT

## 2024-09-11 RX ORDER — BLOOD-GLUCOSE METER
KIT MISCELLANEOUS
Qty: 100 EACH | Refills: 3 | Status: SHIPPED | OUTPATIENT
Start: 2024-09-11

## 2024-09-11 RX ORDER — PRAVASTATIN SODIUM 80 MG/1
80 TABLET ORAL DAILY
Qty: 90 TABLET | Refills: 3 | Status: SHIPPED | OUTPATIENT
Start: 2024-09-11

## 2024-09-11 RX ORDER — AMLODIPINE BESYLATE 10 MG/1
10 TABLET ORAL DAILY
Qty: 90 TABLET | Refills: 3 | Status: SHIPPED | OUTPATIENT
Start: 2024-09-11

## 2024-09-11 RX ORDER — LISINOPRIL AND HYDROCHLOROTHIAZIDE 20; 25 MG/1; MG/1
1 TABLET ORAL DAILY
Qty: 90 TABLET | Refills: 3 | Status: SHIPPED | OUTPATIENT
Start: 2024-09-11

## 2024-09-11 NOTE — PROGRESS NOTES
Assessment/Plan:   Patient obtain fasting labs as below.  Patient to continue present treatment.  Instructed to follow a low-fat, low salt and a low sugar/carbohydrate diet more carefully and get regular aerobic exercise up to 150 minutes/week.  Weight loss encouraged.  Return to the office in 6 months.   Diagnoses and all orders for this visit:    DM type 2, not at goal (HCC)  -     POCT hemoglobin A1c  -     Albumin / creatinine urine ratio; Future  -     Comprehensive metabolic panel; Future    Type 2 diabetes mellitus without complication, without long-term current use of insulin (HCC)  -     metFORMIN (GLUCOPHAGE) 500 mg tablet; Take 2 tablets (1,000 mg total) by mouth 2 (two) times a day  -     glucose blood (FREESTYLE LITE) test strip; USE  TO TEST BLOOD SUGAR ONCE A DAY    Essential hypertension  -     CBC; Future  -     Comprehensive metabolic panel; Future  -     TSH, 3rd generation with Free T4 reflex; Future    Hyperlipidemia, unspecified hyperlipidemia type  -     Comprehensive metabolic panel; Future  -     Lipid Panel with Direct LDL reflex; Future  -     pravastatin (PRAVACHOL) 80 mg tablet; Take 1 tablet (80 mg total) by mouth daily    Primary osteoarthritis involving multiple joints    Class 1 obesity due to excess calories with serious comorbidity and body mass index (BMI) of 33.0 to 33.9 in adult    Enlarged prostate without lower urinary tract symptoms (luts)    Vitamin D deficiency  -     Vitamin D 25 hydroxy; Future    Screening PSA (prostate specific antigen)  -     PSA, Total Screen; Future    Essential hypertension, benign  -     lisinopril-hydrochlorothiazide (PRINZIDE,ZESTORETIC) 20-25 MG per tablet; Take 1 tablet by mouth daily  -     amLODIPine (NORVASC) 10 mg tablet; Take 1 tablet (10 mg total) by mouth daily          Subjective:     Patient ID: Avel Carter is a 69 y.o. male.    Patient is here for follow-up appoint for chronic conditions and due for fasting labs.  Patient has been  feeling well overall.  He remains fairly active walking a couple days a week for 1 mile doing yard work, gardening and woodwork in his garage.  Patient is up-to-date on diabetic eye exam and colonoscopy.    Diabetes  He presents for his follow-up diabetic visit. He has type 2 diabetes mellitus. His disease course has been stable. There are no hypoglycemic associated symptoms. Associated symptoms include polyuria. Pertinent negatives for diabetes include no blurred vision, no chest pain, no fatigue, no foot paresthesias, no foot ulcerations, no polydipsia, no visual change, no weakness and no weight loss. There are no hypoglycemic complications. Symptoms are stable. Diabetic complications include peripheral neuropathy and retinopathy. Pertinent negatives for diabetic complications include no CVA, heart disease or nephropathy. Risk factors for coronary artery disease include dyslipidemia, diabetes mellitus, hypertension, male sex, obesity and family history. Current diabetic treatment includes oral agent (dual therapy). He is compliant with treatment all of the time. His weight is stable. He is following a generally healthy diet. He participates in exercise three times a week. There is no change in his home blood glucose trend. His breakfast blood glucose is taken between 6-7 am. His breakfast blood glucose range is generally 110-130 mg/dl. His bedtime blood glucose is taken between 8-9 pm. His bedtime blood glucose range is generally 140-180 mg/dl. An ACE inhibitor/angiotensin II receptor blocker is being taken. He does not see a podiatrist.Eye exam is current.       Review of Systems   Constitutional:  Negative for fatigue and weight loss.   Eyes:  Negative for blurred vision.   Cardiovascular:  Negative for chest pain.   Endocrine: Positive for polyuria. Negative for polydipsia.   Neurological:  Negative for weakness.         Objective:     Physical Exam  Constitutional:       General: He is not in acute  distress.     Appearance: Normal appearance.   HENT:      Head: Normocephalic.      Mouth/Throat:      Mouth: Mucous membranes are moist.   Eyes:      General: No scleral icterus.     Conjunctiva/sclera: Conjunctivae normal.   Neck:      Vascular: No carotid bruit.   Cardiovascular:      Rate and Rhythm: Normal rate and regular rhythm.   Pulmonary:      Effort: Pulmonary effort is normal.      Breath sounds: Normal breath sounds.   Abdominal:      Palpations: Abdomen is soft.      Tenderness: There is no abdominal tenderness.   Musculoskeletal:      Cervical back: Neck supple.      Right lower leg: No edema.      Left lower leg: No edema.   Lymphadenopathy:      Cervical: No cervical adenopathy.   Skin:     General: Skin is warm and dry.   Neurological:      General: No focal deficit present.      Mental Status: He is alert and oriented to person, place, and time.   Psychiatric:         Mood and Affect: Mood normal.         Behavior: Behavior normal.         Thought Content: Thought content normal.         Judgment: Judgment normal.

## 2024-09-11 NOTE — ASSESSMENT & PLAN NOTE
Overall diabetic control is slightly improved but not at goal with fingerstick hemoglobin A1c at 7.8 today.  Continue present treatment and continue home glucose monitoring.  Discussed goal of hemoglobin A1c less than 7.  Lab Results   Component Value Date    HGBA1C 7.8 (A) 09/11/2024

## 2024-10-30 ENCOUNTER — OFFICE VISIT (OUTPATIENT)
Dept: FAMILY MEDICINE CLINIC | Facility: CLINIC | Age: 69
End: 2024-10-30
Payer: MEDICARE

## 2024-10-30 VITALS
TEMPERATURE: 97 F | SYSTOLIC BLOOD PRESSURE: 136 MMHG | OXYGEN SATURATION: 99 % | BODY MASS INDEX: 34.37 KG/M2 | HEART RATE: 100 BPM | HEIGHT: 67 IN | RESPIRATION RATE: 16 BRPM | DIASTOLIC BLOOD PRESSURE: 80 MMHG | WEIGHT: 219 LBS

## 2024-10-30 DIAGNOSIS — J40 BRONCHITIS: Primary | ICD-10-CM

## 2024-10-30 PROCEDURE — 99213 OFFICE O/P EST LOW 20 MIN: CPT | Performed by: FAMILY MEDICINE

## 2024-10-30 PROCEDURE — G2211 COMPLEX E/M VISIT ADD ON: HCPCS | Performed by: FAMILY MEDICINE

## 2024-10-30 RX ORDER — LEVOFLOXACIN 500 MG/1
500 TABLET, FILM COATED ORAL EVERY 24 HOURS
Qty: 7 TABLET | Refills: 0 | Status: SHIPPED | OUTPATIENT
Start: 2024-10-30 | End: 2024-11-06

## 2024-10-30 NOTE — PROGRESS NOTES
Assessment/Plan:   Patient started on Levaquin 500 mg 1 daily for 7 days and recommend he take Mucinex DM or Robitussin DM as needed.  Patient to avoid OTC decongestants.  Recommend increase fluids and rest.  Return to the office in 1 week or call sooner as needed.    Diagnoses and all orders for this visit:    Bronchitis  -     levofloxacin (LEVAQUIN) 500 mg tablet; Take 1 tablet (500 mg total) by mouth every 24 hours for 7 days          Subjective:     Patient ID: Avel Carter is a 69 y.o. male.    Past 3 weeks patient complains of cough productive of yellow-green mucus.  He admits nasal congestion and postnasal drainage.  Patient admits occasional mild wheezing at night.  He denies fever.  He has treated this with DayQuil.    Cough  This is a new problem. The current episode started 1 to 4 weeks ago. The problem has been unchanged. The cough is Productive of purulent sputum. Associated symptoms include headaches, nasal congestion, postnasal drip, rhinorrhea and wheezing. Pertinent negatives include no chest pain, chills, ear pain, fever, hemoptysis, sore throat, shortness of breath or sweats. He has tried OTC cough suppressant for the symptoms. The treatment provided mild relief. There is no history of asthma or COPD.       Review of Systems   Constitutional:  Negative for chills and fever.   HENT:  Positive for postnasal drip and rhinorrhea. Negative for ear pain and sore throat.    Respiratory:  Positive for cough and wheezing. Negative for hemoptysis and shortness of breath.    Cardiovascular:  Negative for chest pain.   Neurological:  Positive for headaches.         Objective:     Physical Exam  Constitutional:       General: He is not in acute distress.     Appearance: Normal appearance. He is obese. He is ill-appearing. He is not toxic-appearing or diaphoretic.   HENT:      Head: Normocephalic.      Right Ear: Tympanic membrane normal.      Left Ear: Tympanic membrane normal.      Nose: Congestion present.       Mouth/Throat:      Mouth: Mucous membranes are moist.      Pharynx: Oropharynx is clear.   Eyes:      General: No scleral icterus.     Conjunctiva/sclera: Conjunctivae normal.   Cardiovascular:      Rate and Rhythm: Normal rate and regular rhythm.   Pulmonary:      Effort: Pulmonary effort is normal. No respiratory distress.      Breath sounds: Normal breath sounds. No wheezing.   Abdominal:      Palpations: Abdomen is soft.      Tenderness: There is no abdominal tenderness.   Musculoskeletal:      Cervical back: Neck supple.      Right lower leg: No edema.      Left lower leg: No edema.   Lymphadenopathy:      Cervical: No cervical adenopathy.   Skin:     General: Skin is warm and dry.   Neurological:      General: No focal deficit present.      Mental Status: He is alert and oriented to person, place, and time.   Psychiatric:         Mood and Affect: Mood normal.         Behavior: Behavior normal.         Thought Content: Thought content normal.         Judgment: Judgment normal.

## 2024-11-04 ENCOUNTER — TELEPHONE (OUTPATIENT)
Age: 69
End: 2024-11-04

## 2024-11-04 DIAGNOSIS — J40 BRONCHITIS: Primary | ICD-10-CM

## 2024-11-04 RX ORDER — BROMPHENIRAMINE MALEATE, PSEUDOEPHEDRINE HYDROCHLORIDE, AND DEXTROMETHORPHAN HYDROBROMIDE 2; 30; 10 MG/5ML; MG/5ML; MG/5ML
5 SYRUP ORAL 4 TIMES DAILY PRN
Qty: 120 ML | Refills: 0 | Status: SHIPPED | OUTPATIENT
Start: 2024-11-04

## 2024-11-04 NOTE — TELEPHONE ENCOUNTER
Pt made aware -- pt states he will try to take the meds first and if symptoms gets worse he will just call back for a follow up.

## 2024-11-04 NOTE — TELEPHONE ENCOUNTER
Patient called, was into see the provider 10/30 - cough/congestion, was prescribed levofloxacin (LEVAQUIN) 500 mg table   Is NOT feeling any better, cough has gotten worse. Wheezing in chest, rattling noise when exhales.  Is kindly requesting the provider call a stronger medication to the pharmacy on file: [facility]  [place].  Please advise.

## 2024-11-06 ENCOUNTER — APPOINTMENT (OUTPATIENT)
Dept: RADIOLOGY | Facility: MEDICAL CENTER | Age: 69
End: 2024-11-06
Payer: MEDICARE

## 2024-11-06 DIAGNOSIS — J40 BRONCHITIS: ICD-10-CM

## 2024-11-06 PROCEDURE — 71046 X-RAY EXAM CHEST 2 VIEWS: CPT

## 2024-12-16 DIAGNOSIS — E11.9 TYPE 2 DIABETES MELLITUS WITHOUT COMPLICATION, WITHOUT LONG-TERM CURRENT USE OF INSULIN (HCC): ICD-10-CM

## 2024-12-16 NOTE — TELEPHONE ENCOUNTER
Reason for call:   [x] Refill   [] Prior Auth  [] Other:     Office:   [x] PCP/Provider -  Juan Manuel Calero DO   [] Specialty/Provider -     Medication: glimepiride (AMARYL) 2 mg tablet      Dose/Frequency: Take 1 tablet (2 mg total) by mouth 2 (two) times a day with meals     Quantity: 180    Pharmacy: [id number] [place], [place] - [id number]     Does the patient have enough for 3 days?   [x] Yes   [] No - Send as HP to POD

## 2024-12-17 RX ORDER — GLIMEPIRIDE 2 MG/1
2 TABLET ORAL 2 TIMES DAILY WITH MEALS
Qty: 180 TABLET | Refills: 1 | Status: SHIPPED | OUTPATIENT
Start: 2024-12-17 | End: 2025-12-12

## 2024-12-20 ENCOUNTER — TELEPHONE (OUTPATIENT)
Dept: FAMILY MEDICINE CLINIC | Facility: CLINIC | Age: 69
End: 2024-12-20

## 2024-12-20 NOTE — TELEPHONE ENCOUNTER
Pt called refill line and stated that last January pt had some back pain and was given a medication that he can't remember the name of but it worked and pt is having back pain again and would like to have the medication [facility] into [id number] [place] - [id number]  . Please call the pt with any questions or concerns

## 2025-01-16 ENCOUNTER — OFFICE VISIT (OUTPATIENT)
Dept: FAMILY MEDICINE CLINIC | Facility: CLINIC | Age: 70
End: 2025-01-16
Payer: MEDICARE

## 2025-01-16 ENCOUNTER — RESULTS FOLLOW-UP (OUTPATIENT)
Dept: FAMILY MEDICINE CLINIC | Facility: CLINIC | Age: 70
End: 2025-01-16

## 2025-01-16 ENCOUNTER — HOSPITAL ENCOUNTER (OUTPATIENT)
Dept: RADIOLOGY | Facility: IMAGING CENTER | Age: 70
End: 2025-01-16
Payer: MEDICARE

## 2025-01-16 VITALS
BODY MASS INDEX: 34.53 KG/M2 | SYSTOLIC BLOOD PRESSURE: 146 MMHG | HEIGHT: 67 IN | WEIGHT: 220 LBS | DIASTOLIC BLOOD PRESSURE: 84 MMHG | TEMPERATURE: 97.5 F | RESPIRATION RATE: 16 BRPM | OXYGEN SATURATION: 96 % | HEART RATE: 96 BPM

## 2025-01-16 DIAGNOSIS — R30.0 DYSURIA: ICD-10-CM

## 2025-01-16 DIAGNOSIS — R10.30 LOWER ABDOMINAL PAIN: Primary | ICD-10-CM

## 2025-01-16 DIAGNOSIS — R10.30 LOWER ABDOMINAL PAIN: ICD-10-CM

## 2025-01-16 LAB
SL AMB  POCT GLUCOSE, UA: 50
SL AMB LEUKOCYTE ESTERASE,UA: NEGATIVE
SL AMB POCT BILIRUBIN,UA: NEGATIVE
SL AMB POCT BLOOD,UA: ABNORMAL
SL AMB POCT CLARITY,UA: CLEAR
SL AMB POCT COLOR,UA: YELLOW
SL AMB POCT KETONES,UA: ABNORMAL
SL AMB POCT NITRITE,UA: NEGATIVE
SL AMB POCT PH,UA: 5
SL AMB POCT SPECIFIC GRAVITY,UA: 1.02
SL AMB POCT URINE PROTEIN: ABNORMAL
SL AMB POCT UROBILINOGEN: NORMAL

## 2025-01-16 PROCEDURE — 81002 URINALYSIS NONAUTO W/O SCOPE: CPT | Performed by: FAMILY MEDICINE

## 2025-01-16 PROCEDURE — 99214 OFFICE O/P EST MOD 30 MIN: CPT | Performed by: FAMILY MEDICINE

## 2025-01-16 PROCEDURE — 74176 CT ABD & PELVIS W/O CONTRAST: CPT

## 2025-01-16 RX ORDER — CIPROFLOXACIN 500 MG/1
500 TABLET, FILM COATED ORAL EVERY 12 HOURS SCHEDULED
Qty: 14 TABLET | Refills: 0 | Status: SHIPPED | OUTPATIENT
Start: 2025-01-16 | End: 2025-01-23

## 2025-01-16 RX ORDER — TAMSULOSIN HYDROCHLORIDE 0.4 MG/1
CAPSULE ORAL AS NEEDED
COMMUNITY
Start: 2025-01-14 | End: 2025-01-16

## 2025-01-16 RX ORDER — TAMSULOSIN HYDROCHLORIDE 0.4 MG/1
0.4 CAPSULE ORAL
Qty: 30 CAPSULE | Refills: 0 | Status: SHIPPED | OUTPATIENT
Start: 2025-01-16

## 2025-01-16 NOTE — PROGRESS NOTES
Assessment/Plan:   UA dip is negative.  Patient is being sent for CT of the abdomen and pelvis stat.  Will heed results.  Patient started on Cipro 500 mg 1 twice daily for 7 days.  Recommend increased fluids and follow a light diet.  Return to the office in 1 week or call sooner as needed or report to emergency room for worsening symptoms.   Diagnoses and all orders for this visit:    Lower abdominal pain  -     CT abdomen pelvis wo contrast; Future  -     ciprofloxacin (CIPRO) 500 mg tablet; Take 1 tablet (500 mg total) by mouth every 12 (twelve) hours for 7 days    Dysuria  -     POCT urine dip  -     CT abdomen pelvis wo contrast; Future  -     ciprofloxacin (CIPRO) 500 mg tablet; Take 1 tablet (500 mg total) by mouth every 12 (twelve) hours for 7 days    Other orders  -     Discontinue: tamsulosin (FLOMAX) 0.4 mg; if needed (Patient taking differently: No sig reported)  -     tamsulosin (FLOMAX) 0.4 mg; Take 1 capsule (0.4 mg total) by mouth daily with dinner          Subjective:     Patient ID: Avel Carter is a 69 y.o. male.    Patient complains of lower abdominal pain and pressure below his bellybutton for the past 2 weeks.  Symptoms are constant and worse with bending forward.  Appetite remains good although patient admits to mild nausea but no vomiting.  He admits to some constipation but denies diarrhea, melena or hematochezia.  Patient admits to chronic dysuria but denies pain burning or hematuria.  Patient denies fever.  Patient saw Dr. Baltazar, urologist 2 days ago and he added Flomax.    Abdominal Pain  This is a new problem. The current episode started 1 to 4 weeks ago. The problem occurs constantly. The problem has been waxing and waning. The pain is located in the suprapubic region. The quality of the pain is aching and dull. The abdominal pain does not radiate. Associated symptoms include constipation, dysuria and nausea. Pertinent negatives include no anorexia, belching, diarrhea, fever, flatus,  frequency, hematochezia, hematuria, melena, vomiting or weight loss. The pain is aggravated by certain positions (bending forward). The pain is relieved by Nothing. He has tried nothing for the symptoms. There is no history of colon cancer, Crohn's disease, gallstones, GERD, irritable bowel syndrome, PUD or ulcerative colitis.   Nausea  Associated symptoms include abdominal pain and nausea. Pertinent negatives include no anorexia, fever or vomiting.       Review of Systems   Constitutional:  Negative for fever and weight loss.   Gastrointestinal:  Positive for abdominal pain, constipation and nausea. Negative for anorexia, diarrhea, flatus, hematochezia, melena and vomiting.   Genitourinary:  Positive for dysuria. Negative for frequency and hematuria.         Objective:     Physical Exam  Constitutional:       General: He is not in acute distress.     Appearance: He is well-developed. He is obese. He is not ill-appearing, toxic-appearing or diaphoretic.   HENT:      Head: Normocephalic.      Mouth/Throat:      Mouth: Mucous membranes are moist.   Eyes:      General: No scleral icterus.  Cardiovascular:      Rate and Rhythm: Normal rate and regular rhythm.   Pulmonary:      Effort: Pulmonary effort is normal.      Breath sounds: Normal breath sounds.   Abdominal:      General: Abdomen is protuberant. Bowel sounds are normal.      Palpations: Abdomen is soft.      Tenderness: There is abdominal tenderness in the suprapubic area and left lower quadrant. There is no right CVA tenderness, left CVA tenderness, guarding or rebound.      Comments: Mild tenderness   Skin:     General: Skin is warm and dry.   Neurological:      General: No focal deficit present.      Mental Status: He is alert and oriented to person, place, and time.   Psychiatric:         Mood and Affect: Mood normal.         Behavior: Behavior normal.

## 2025-01-27 DIAGNOSIS — R10.30 LOWER ABDOMINAL PAIN: Primary | ICD-10-CM

## 2025-02-12 ENCOUNTER — OFFICE VISIT (OUTPATIENT)
Dept: GASTROENTEROLOGY | Facility: MEDICAL CENTER | Age: 70
End: 2025-02-12
Payer: MEDICARE

## 2025-02-12 VITALS
BODY MASS INDEX: 35.4 KG/M2 | DIASTOLIC BLOOD PRESSURE: 83 MMHG | WEIGHT: 226 LBS | HEART RATE: 88 BPM | TEMPERATURE: 96.8 F | SYSTOLIC BLOOD PRESSURE: 159 MMHG

## 2025-02-12 DIAGNOSIS — R10.30 LOWER ABDOMINAL PAIN: Primary | ICD-10-CM

## 2025-02-12 DIAGNOSIS — K59.00 CONSTIPATION, UNSPECIFIED CONSTIPATION TYPE: ICD-10-CM

## 2025-02-12 PROCEDURE — 99204 OFFICE O/P NEW MOD 45 MIN: CPT | Performed by: INTERNAL MEDICINE

## 2025-02-12 NOTE — PROGRESS NOTES
Name: Avel Carter      : 1955      MRN: [id number]  Encounter Provider: Diana M Jaiyeola, MD  Encounter Date: 2025   Enc[facility]  :  Assessment & Plan  Lower abdominal pain  He reports onset of lower abdominal pain and has symptoms related to constipation.  He underwent CT abdomen pelvis recently for his symptoms which was overall unremarkable.  His last colonoscopy was in  and a total of 2 tubular adenomas were removed and he is due for surveillance exam for colon cancer screening in .  He discussed that his lower abdominal discomfort may be related to constipation given his reports of [place] type I stool associated with straining.  I recommend starting a fiber supplement with Metamucil once daily and increasing to twice daily as needed in addition to a high-fiber diet with adequate hydration.  He has no indication for sooner colonoscopy at this time, however if symptoms persist despite constipation treatment he may benefit from sooner colonoscopy to rule out obstructive process which could be contributing to his symptoms.  Orders:    Ambulatory Referral to Gastroenterology    Constipation, unspecified constipation type           Follow-up in 3 to 4 months  History of Present Illness   HPI  Avel Carter is a 70 y.o. male who presents for evaluation.    He was last seen in the GI office in  for colon cancer screening.  He underwent colonoscopy in  showing 4 subcentimeter colon polyps, diverticulosis and small internal hemorrhoids.  Pathology showed 2 tubular adenomas and he was recommended repeat colonoscopy in 7 years.    Interval history: He was seen by his primary care doctor last month for lower abdominal pain.  He underwent CT abdomen pelvis showing diverticulosis without diverticulitis and otherwise no acute finding.    He reports several years of lower abdominal pressure.  He states his pressure can be intermittent  and does not cause pain.  At times he can have abdominal bloating and gurgling sensation.  He usually has a bowel movement once daily but can have episodes of [place] type I stool associated with straining.  He denies rectal bleeding or mucus in his stools.  He has some nausea but no vomiting and denies chronic symptoms of acid reflux    He reports no family history of gastrointestinal disease    9/2024 hemoglobin 14.8, platelets 277, liver enzymes are within normal limits    He takes no antiplatelet or anticoagulant medication    He has no prior EGD      Review of Systems   Constitutional:  Negative for chills and fever.   HENT:  Negative for ear pain and sore throat.    Eyes:  Negative for pain and visual disturbance.   Respiratory:  Negative for cough and shortness of breath.    Cardiovascular:  Negative for chest pain and palpitations.   Gastrointestinal:  Positive for constipation. Negative for abdominal pain and vomiting.   Genitourinary:  Negative for dysuria and hematuria.   Musculoskeletal:  Negative for arthralgias and back pain.   Skin:  Negative for color change and rash.   Neurological:  Negative for seizures and syncope.   All other systems reviewed and are negative.         Objective   There were no vitals taken for this visit.     Physical Exam  Vitals and nursing note reviewed.   Constitutional:       General: He is not in acute distress.     Appearance: He is well-developed.   HENT:      Head: Normocephalic and atraumatic.   Eyes:      Conjunctiva/sclera: Conjunctivae normal.   Cardiovascular:      Rate and Rhythm: Normal rate and regular rhythm.      Heart sounds: No murmur heard.  Pulmonary:      Effort: Pulmonary effort is normal. No respiratory distress.      Breath sounds: Normal breath sounds.   Abdominal:      Palpations: Abdomen is soft.      Tenderness: There is no abdominal tenderness.   Musculoskeletal:         General: No swelling.      Cervical back: Neck supple.   Skin:     General:  Skin is warm and dry.      Capillary Refill: Capillary refill takes less than 2 seconds.   Neurological:      Mental Status: He is alert.   Psychiatric:         Mood and Affect: Mood normal.

## 2025-05-04 ENCOUNTER — OFFICE VISIT (OUTPATIENT)
Dept: URGENT CARE | Facility: MEDICAL CENTER | Age: 70
End: 2025-05-04
Payer: MEDICARE

## 2025-05-04 VITALS
DIASTOLIC BLOOD PRESSURE: 82 MMHG | TEMPERATURE: 98 F | OXYGEN SATURATION: 96 % | HEART RATE: 87 BPM | SYSTOLIC BLOOD PRESSURE: 120 MMHG | RESPIRATION RATE: 18 BRPM

## 2025-05-04 DIAGNOSIS — R05.1 ACUTE COUGH: Primary | ICD-10-CM

## 2025-05-04 PROCEDURE — G0463 HOSPITAL OUTPT CLINIC VISIT: HCPCS | Performed by: NURSE PRACTITIONER

## 2025-05-04 PROCEDURE — 99213 OFFICE O/P EST LOW 20 MIN: CPT | Performed by: NURSE PRACTITIONER

## 2025-05-04 RX ORDER — AZITHROMYCIN 250 MG/1
TABLET, FILM COATED ORAL
Qty: 6 TABLET | Refills: 0 | Status: SHIPPED | OUTPATIENT
Start: 2025-05-04 | End: 2025-05-08

## 2025-05-04 RX ORDER — PREDNISONE 20 MG/1
40 TABLET ORAL DAILY
Qty: 10 TABLET | Refills: 0 | Status: SHIPPED | OUTPATIENT
Start: 2025-05-04 | End: 2025-05-09

## 2025-05-04 RX ORDER — ALBUTEROL SULFATE 90 UG/1
2 INHALANT RESPIRATORY (INHALATION) EVERY 6 HOURS PRN
Qty: 8.5 G | Refills: 0 | Status: SHIPPED | OUTPATIENT
Start: 2025-05-04

## 2025-05-04 NOTE — PROGRESS NOTES
[facility]        NAME: Avel Carter is a 70 y.o. male  : 1955    MRN: [id number]  DATE: May 4, 2025  TIME: 10:40 AM    Assessment and Plan   Acute cough [R05.1]  1. Acute cough  predniSONE 20 mg tablet    albuterol (ProAir HFA) 90 mcg/act inhaler    azithromycin (ZITHROMAX) 250 mg tablet        Patient in NAD and VSS upon exam. Discussed results of exam in office, will treat cough with azithromycin, prednisone and albuterol inhaler.  Discussed supportive care and return precautions.       Patient Instructions       Follow up with PCP in 3-5 days.  Proceed to  ER if symptoms worsen.    If tests have been performed at [facility], our office will contact you with results if changes need to be made to the care plan discussed with you at the visit.  [facility]    Chief Complaint     Chief Complaint   Patient presents with   • Cold Like Symptoms     Patient c/o nasal congestion with cough and rib pain from coughing x 1 weeks          History of Present Illness       Started: 1 week  Positive: cough, congestion, ribs and HA from cough  Feels cough has improved today  Negative: fever, ST, body aches, fatigue  Denies CP, SOB, trouble breathing  Treatment: robitussin  Patient denies any hx of COPD or asthma    Cough  This is a recurrent problem. The current episode started in the past 7 days. The problem has been gradually worsening. The problem occurs every few minutes. The cough is Productive of brown sputum. Associated symptoms include headaches, hemoptysis, nasal congestion, postnasal drip, rhinorrhea, a sore throat and wheezing. Pertinent negatives include no chest pain, chills, ear congestion, ear pain, fever, heartburn, myalgias, rash, shortness of breath, sweats or weight loss. Nothing aggravates the symptoms.       Review of Systems   Review of Systems   Constitutional:  Negative for chills, fever and weight loss.   HENT:  Positive for postnasal drip,  rhinorrhea and sore throat. Negative for ear pain.    Respiratory:  Positive for cough, hemoptysis and wheezing. Negative for shortness of breath.    Cardiovascular:  Negative for chest pain.   Gastrointestinal:  Negative for heartburn.   Musculoskeletal:  Negative for myalgias.   Skin:  Negative for rash.   Neurological:  Positive for headaches.         Current Medications       Current Outpatient Medications:   •  albuterol (ProAir HFA) 90 mcg/act inhaler, Inhale 2 puffs every 6 (six) hours as needed for shortness of breath, Disp: 8.5 g, Rfl: 0  •  azithromycin (ZITHROMAX) 250 mg tablet, Take 2 tablets today then 1 tablet daily x 4 days, Disp: 6 tablet, Rfl: 0  •  predniSONE 20 mg tablet, Take 2 tablets (40 mg total) by mouth daily for 5 days, Disp: 10 tablet, Rfl: 0  •  amLODIPine (NORVASC) 10 mg tablet, Take 1 tablet (10 mg total) by mouth daily, Disp: 90 tablet, Rfl: 3  •  aspirin (ECOTRIN LOW STRENGTH) 81 mg EC tablet, Take 1 tablet (81 mg total) by mouth daily, Disp: 30 tablet, Rfl: 0  •  Cholecalciferol (VITAMIN D) 2000 units CAPS, Take 1 capsule by mouth daily, Disp: , Rfl:   •  finasteride (PROSCAR) 5 mg tablet, Take 5 mg by mouth daily, Disp: , Rfl:   •  glimepiride (AMARYL) 2 mg tablet, Take 1 tablet (2 mg total) by mouth 2 (two) times a day with meals, Disp: 180 tablet, Rfl: 1  •  glucose blood (FREESTYLE LITE) test strip, USE  TO TEST BLOOD SUGAR ONCE A DAY, Disp: 100 each, Rfl: 3  •  Lancets (FREESTYLE) lancets, Use daily, Disp: , Rfl:   •  lisinopril-hydrochlorothiazide (PRINZIDE,ZESTORETIC) 20-25 MG per tablet, Take 1 tablet by mouth daily, Disp: 90 tablet, Rfl: 3  •  metFORMIN (GLUCOPHAGE) 500 mg tablet, Take 2 tablets (1,000 mg total) by mouth 2 (two) times a day, Disp: 360 tablet, Rfl: 3  •  pravastatin (PRAVACHOL) 80 mg tablet, Take 1 tablet (80 mg total) by mouth daily, Disp: 90 tablet, Rfl: 3  •  psyllium (METAMUCIL) 58.6 % powder, Take 1 packet by mouth daily, Disp: 30 packet, Rfl: 3  •   tamsulosin (FLOMAX) 0.4 mg, Take 1 capsule (0.4 mg total) by mouth daily with dinner, Disp: 30 capsule, Rfl: 0    Current Allergies     Allergies as of 05/04/2025 - Reviewed 05/04/2025   Allergen Reaction Noted   • Naproxen Nausea Only 04/25/2019            The following portions of the patient's history were reviewed and updated as appropriate: allergies, current medications, past family history, past medical history, past social history, past surgical history and problem list.     Past Medical History:   Diagnosis Date   • Arthritis    • Diabetes mellitus (HCC)    • Hyperlipidemia    • Hypertension    • Kidney stone    • Otitis media    • Umbilical hernia    • Wears glasses        Past Surgical History:   Procedure Laterality Date   • COLONOSCOPY     • FRACTURE SURGERY      right hand and wrist   • HERNIA REPAIR     • JOINT REPLACEMENT      b/l knee    • KNEE SURGERY     • MT LITHOTRIPSY XTRCORP SHOCK WAVE Left 08/22/2019    Procedure: ESWL OF RENAL CALCULUS;  Surgeon: Suleiman Baltazar DO;  Location:  MAIN OR;  Service: Urology   • MT RPR UMBILICAL HRNA 5 YRS/> REDUCIBLE N/A 08/05/2021    Procedure: REPAIR HERNIA UMBILICAL;  Surgeon: Stefan Sosa MD;  Location: [facility];  Service: General       Family History   Problem Relation Age of Onset   • Diabetes Mother    • Prostate cancer Father    • Other Maternal Uncle         CABG         Medications have been verified.        Objective   /82   Pulse 87   Temp 98 °F (36.7 °C)   Resp 18   SpO2 96%   No LMP for male patient.       Physical Exam     Physical Exam  Constitutional:       General: He is not in acute distress.     Appearance: Normal appearance. He is not ill-appearing.   HENT:      Head: Normocephalic and atraumatic.      Right Ear: Hearing, tympanic membrane, ear canal and external ear normal.      Left Ear: Hearing, tympanic membrane, ear canal and external ear normal.      Nose: No congestion.      Right Sinus: No maxillary sinus tenderness  or frontal sinus tenderness.      Left Sinus: No maxillary sinus tenderness or frontal sinus tenderness.      Mouth/Throat:      Lips: Pink.      Mouth: Mucous membranes are moist.      Pharynx: Oropharynx is clear.   Cardiovascular:      Rate and Rhythm: Normal rate and regular rhythm.   Pulmonary:      Effort: Pulmonary effort is normal.      Breath sounds: Normal breath sounds.      Comments: Dry cough on exam  Musculoskeletal:         General: Normal range of motion.   Lymphadenopathy:      Cervical: No cervical adenopathy.   Skin:     General: Skin is warm and dry.   Neurological:      Mental Status: He is alert and oriented to person, place, and time.

## 2025-05-04 NOTE — PATIENT INSTRUCTIONS
Prednisone once a day for 5 days  Albuterol inhaler every 4-6 hours as needed for chest tightness/shortness of breath/wheezing  Azithromycin as directed  OTC cough medicine Chestal as needed for cough  Mucinex for congestion  Saline nasal spray for congestion  Increase hydration and rest as needed  Follow up with PCP if symptoms are not improving  If worsening symptoms ie chest pain, difficulty breathing, please go to the Emergency Room

## 2025-05-12 ENCOUNTER — TELEPHONE (OUTPATIENT)
Age: 70
End: 2025-05-12

## 2025-05-12 NOTE — TELEPHONE ENCOUNTER
Patient called in to schedule his follow up on diabetic visit. Did schedule him for Thursday but he requested for all the lab orders to be placed.    Did offer him to be scheduled for his annual wellness visit. He denied to get in for ANNUAL WELLNESS VISIT.    Kindly check with the provider and place the necessary lab orders and keep him informed by an return call. Thanks

## 2025-05-14 ENCOUNTER — RA CDI HCC (OUTPATIENT)
Dept: OTHER | Facility: HOSPITAL | Age: 70
End: 2025-05-14

## 2025-05-15 ENCOUNTER — OFFICE VISIT (OUTPATIENT)
Dept: FAMILY MEDICINE CLINIC | Facility: CLINIC | Age: 70
End: 2025-05-15
Payer: MEDICARE

## 2025-05-15 VITALS
WEIGHT: 225.2 LBS | HEART RATE: 88 BPM | RESPIRATION RATE: 16 BRPM | HEIGHT: 67 IN | SYSTOLIC BLOOD PRESSURE: 142 MMHG | DIASTOLIC BLOOD PRESSURE: 70 MMHG | OXYGEN SATURATION: 98 % | TEMPERATURE: 97.6 F | BODY MASS INDEX: 35.35 KG/M2

## 2025-05-15 DIAGNOSIS — E11.9 DM TYPE 2, NOT AT GOAL (HCC): Primary | ICD-10-CM

## 2025-05-15 DIAGNOSIS — E66.09 CLASS 1 OBESITY DUE TO EXCESS CALORIES WITH SERIOUS COMORBIDITY AND BODY MASS INDEX (BMI) OF 34.0 TO 34.9 IN ADULT: ICD-10-CM

## 2025-05-15 DIAGNOSIS — E66.811 CLASS 1 OBESITY DUE TO EXCESS CALORIES WITH SERIOUS COMORBIDITY AND BODY MASS INDEX (BMI) OF 34.0 TO 34.9 IN ADULT: ICD-10-CM

## 2025-05-15 DIAGNOSIS — E78.5 HYPERLIPIDEMIA, UNSPECIFIED HYPERLIPIDEMIA TYPE: ICD-10-CM

## 2025-05-15 DIAGNOSIS — E11.3291 MILD NONPROLIFERATIVE DIABETIC RETINOPATHY OF RIGHT EYE ASSOCIATED WITH TYPE 2 DIABETES MELLITUS, MACULAR EDEMA PRESENCE UNSPECIFIED (HCC): ICD-10-CM

## 2025-05-15 DIAGNOSIS — I10 ESSENTIAL HYPERTENSION: ICD-10-CM

## 2025-05-15 DIAGNOSIS — N40.0 ENLARGED PROSTATE WITHOUT LOWER URINARY TRACT SYMPTOMS (LUTS): ICD-10-CM

## 2025-05-15 LAB — SL AMB POCT HEMOGLOBIN AIC: 9.5 (ref ?–6.5)

## 2025-05-15 PROCEDURE — 99214 OFFICE O/P EST MOD 30 MIN: CPT | Performed by: FAMILY MEDICINE

## 2025-05-15 PROCEDURE — G2211 COMPLEX E/M VISIT ADD ON: HCPCS | Performed by: FAMILY MEDICINE

## 2025-05-15 PROCEDURE — 83036 HEMOGLOBIN GLYCOSYLATED A1C: CPT | Performed by: FAMILY MEDICINE

## 2025-05-15 NOTE — ASSESSMENT & PLAN NOTE
Blood pressure controlled on lisinopril HCTZ and amlodipine.  Continue present treatment and follow a low-salt diet and regular aerobic exercise walking as tolerated.

## 2025-05-15 NOTE — ASSESSMENT & PLAN NOTE
Lab Results   Component Value Date    HGBA1C 9.5 (A) 05/15/2025   Overall diabetic control worse and not at goal with fingerstick hemoglobin A1c at 9.5 today.  Patient to continue present treatment and continue home glucose monitoring.  Discussed treatment options and patient is being referred to [facility] clinical pharmacologist and [facility] endocrinology to assist with medications that are affordable for patient to take.    Orders:    POCT hemoglobin A1c    Albumin / creatinine urine ratio; Future    Ambulatory referral to clinical pharmacy; Future    Ambulatory Referral to Endocrinology; Future

## 2025-05-15 NOTE — ASSESSMENT & PLAN NOTE
Lipids controlled on pravastatin 80 mg daily.  Continue present treatment and follow a low-fat low-cholesterol diet.

## 2025-05-15 NOTE — PROGRESS NOTES
:  Assessment & Plan  DM type 2, not at goal ([facility])    Lab Results   Component Value Date    HGBA1C 9.5 (A) 05/15/2025   Overall diabetic control worse and not at goal with fingerstick hemoglobin A1c at 9.5 today.  Patient to continue present treatment and continue home glucose monitoring.  Discussed treatment options and patient is being referred to [facility] clinical pharmacologist and [facility] endocrinology to assist with medications that are affordable for patient to take.    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       Essential hypertension  Blood pressure controlled on lisinopril HCTZ and amlodipine.  Continue present treatment and follow a low-salt diet and regular aerobic exercise walking as tolerated.       Hyperlipidemia, unspecified hyperlipidemia type  Lipids controlled on pravastatin 80 mg daily.  Continue present treatment and follow a low-fat low-cholesterol diet.       Class 1 obesity due to excess calories with serious comorbidity and body mass index (BMI) of 34.0 to 34.9 in adult    Weight loss encouraged and discussed morbidity associate with obesity.       Enlarged prostate without lower urinary tract symptoms (luts)  Clinically stable on Flomax and finasteride.  Continue present treatment.           History of Present Illness     Avel Carter is a 70 y.o. male   Patient is here for follow-up appoint for chronic conditions.  Patient recently seen at urgent care for for acute cough and treated with Zithromax, prednisone and albuterol inhaler with some improvement.  He admits to continued cough productive  of whitish mucus.  Patient denies fever.  He has been treating this with Robitussin with some relief.  Home glucose monitoring reveals elevated blood sugar readings with fasting blood sugars greater than 200 and 9 PM blood sugars greater than 300 recently.  Patient is overdue for diabetic eye exam and encouraged to schedule.  Patient declines foot exam.  Patient has been unable to afford several diabetic medications which were recommended.    Diabetes  He presents for his follow-up diabetic visit. He has type 2 diabetes mellitus. His disease course has been worsening. There are no hypoglycemic associated symptoms. Associated symptoms include fatigue and foot paresthesias. Pertinent negatives for diabetes include no blurred vision, no chest pain, no foot ulcerations, no polydipsia, no polyuria, no visual change, no weakness and no weight loss. There are no hypoglycemic complications. Symptoms are stable. Diabetic complications include peripheral neuropathy. Pertinent negatives for diabetic complications include no CVA, heart disease, nephropathy or retinopathy. Risk factors for coronary artery disease include diabetes mellitus, dyslipidemia, family history, hypertension, male sex and obesity. Current diabetic treatment includes oral agent (dual therapy). He is compliant with treatment all of the time. His weight is increasing steadily. He is following a generally healthy diet. He participates in exercise intermittently. His home blood glucose trend is increasing steadily. His breakfast blood glucose is taken between 7-8 am. His breakfast blood glucose range is generally >200 mg/dl. His bedtime blood glucose is taken between 8-9 pm. His bedtime blood glucose range is generally >200 mg/dl. An ACE inhibitor/angiotensin II receptor blocker is being taken. He does not see a podiatrist.Eye exam is not current.     Review of Systems   Constitutional:  Positive for fatigue. Negative for weight loss.   Eyes:  Negative for  "blurred vision.   Cardiovascular:  Negative for chest pain.   Endocrine: Negative for polydipsia and polyuria.   Neurological:  Negative for weakness.     Objective   /70   Pulse 88   Temp 97.6 °F (36.4 °C)   Resp 16   Ht 5' 7\" (1.702 m)   Wt 102 kg (225 lb 3.2 oz)   SpO2 98%   BMI 35.27 kg/m²      Physical Exam  Constitutional:       General: He is not in acute distress.     Appearance: Normal appearance. He is obese. He is not ill-appearing, toxic-appearing or diaphoretic.   HENT:      Head: Normocephalic.      Mouth/Throat:      Mouth: Mucous membranes are moist.     Eyes:      General: No scleral icterus.     Conjunctiva/sclera: Conjunctivae normal.     Neck:      Vascular: No carotid bruit.     Cardiovascular:      Rate and Rhythm: Normal rate and regular rhythm.   Pulmonary:      Effort: Pulmonary effort is normal. No respiratory distress.      Breath sounds: Normal breath sounds. No wheezing.   Abdominal:      Palpations: Abdomen is soft.      Tenderness: There is no abdominal tenderness.     Musculoskeletal:      Cervical back: Neck supple.      Right lower leg: No edema.      Left lower leg: No edema.   Lymphadenopathy:      Cervical: No cervical adenopathy.     Skin:     General: Skin is warm and dry.     Neurological:      General: No focal deficit present.      Mental Status: He is alert and oriented to person, place, and time.     Psychiatric:         Mood and Affect: Mood normal.         Behavior: Behavior normal.         Thought Content: Thought content normal.         Judgment: Judgment normal.           "

## 2025-05-15 NOTE — ASSESSMENT & PLAN NOTE
Lab Results   Component Value Date    HGBA1C 9.5 (A) 05/15/2025     Recommend patient schedule diabetic eye exam.

## 2025-05-16 ENCOUNTER — TELEPHONE (OUTPATIENT)
Dept: FAMILY MEDICINE CLINIC | Facility: CLINIC | Age: 70
End: 2025-05-16

## 2025-05-16 NOTE — TELEPHONE ENCOUNTER
Please contact patient to schedule Clinical Pharmacist Appointment     Reason for appointment: diabetes management   When to schedule with Pharmacist: anytime  What should the patient bring to the appointment: blood sugar readings    Appointment Department:  [facility]  Pharmacist patient will be seeing: Bess Plummer  Visit Type Preference (ie Phone, Video, In-person): either based on patient preference  In-person visits will be at:  [facility]  Virtual visits will be completed via [employer] or Phone - please clarify patient preference in appointment notes    Please respond to this note to keep track of the number of patient outreaches.     Please try to reach out to patient on 2 separate days

## 2025-05-26 DIAGNOSIS — R05.1 ACUTE COUGH: ICD-10-CM

## 2025-05-27 RX ORDER — ALBUTEROL SULFATE 90 UG/1
INHALANT RESPIRATORY (INHALATION)
OUTPATIENT
Start: 2025-05-27

## 2025-05-29 ENCOUNTER — OFFICE VISIT (OUTPATIENT)
Dept: FAMILY MEDICINE CLINIC | Facility: CLINIC | Age: 70
End: 2025-05-29

## 2025-05-29 ENCOUNTER — PATIENT OUTREACH (OUTPATIENT)
Dept: CASE MANAGEMENT | Facility: OTHER | Age: 70
End: 2025-05-29

## 2025-05-29 DIAGNOSIS — E11.9 DM TYPE 2, NOT AT GOAL (HCC): Primary | ICD-10-CM

## 2025-05-29 PROCEDURE — PBNCHG PB NO CHARGE PLACEHOLDER: Performed by: PHARMACIST

## 2025-05-29 NOTE — ASSESSMENT & PLAN NOTE
Lab Results   Component Value Date    HGBA1C 9.5 (A) 05/15/2025     Most recent A1c above goal   Self-monitored blood glucose elevated.   Prefer to hold off on GLP1 until after patient has updated eye exam as patient does have history of retinopathy.   Could consider SGLT2i however previously cost prohibitive; medication will likely not get patient to goal alone.   May benefit from basal insulin; would also then optimize cgm.   Patient with hyperglycemia signs/symptoms        Medications: patient reviewed understanding. May need to look into PAPs if medication's are cost prohibitive. Patient to schedule diabetes eye exam to see if we can pursue GLP1 or basal insulin as he is uncertain how he would prefer to proceed.  Metformin, glimepiride: continue for now.   Home Monitoring:   BLOOD SUGAR TESTING  Please test your blood sugar:  1 Times per day.  When to test your blood sugar:   Before Breakfast and Bedtime - alternating times  Target Blood sugar range: Before Meals: , 2 hours after meals: < 180  Call if your blood sugar is less than 60   Lifestyle Modifications:referral entered for CM      Orders:    Ambulatory referral to complex care management program; Future

## 2025-05-29 NOTE — PROGRESS NOTES
Outpatient Care Management Note:    New Diabetic referral received from clinical pharmacist.  Patient's [id number]=8.5.       CM called Avel and introduced myself. We discussed his elevated HbA1C.  We talked about meal planning with the plate method.  CM encouraged Avel to try it. Avel does not think that he is eating any differently to cause his elevated HbA1C.  He states that he drinks mostly coffee and iced tea but uses splenda instead of sugar. Both he and his daughter cook.  He did state that he likes his snacks.  I encouraged him to skip the cakes and cookies and try healthier snacks like a few crackers with peanutbutter or fruit.  Avel states that he likes peanut butter filled pretzels. We briefly discussed reading food labels to review the amount of carbohydrates in his pretzels and checking the serving size. Avel did not have the pretzels handy and did not want to go get them to review the nutrition label. CM reviewed that his snacks should consist of approximately 15 gm of carbohydrates and his meals should consist of approximately 45-60 gm of carbohydrates     Avel declined completing a med review stating he has all medications and can afford them presently.     He routinely monitors his blood sugars once a day.  We discussed blood sugar target goals.    Avel was tired, but did agree to CM following up in 1-2 weeks.  CM offered him my contact information but he declined.

## 2025-05-29 NOTE — PROGRESS NOTES
[facility]acy [facility]  Bess Plummer    Administrative Statements   Encounter provider Bess Plummer      Assessment/ Plan     Assessment & Plan  DM type 2, not at goal (HCC)    Lab Results   Component Value Date    HGBA1C 9.5 (A) 05/15/2025     Most recent A1c above goal   Self-monitored blood glucose elevated.   Prefer to hold off on GLP1 until after patient has updated eye exam as patient does have history of retinopathy.   Could consider SGLT2i however previously cost prohibitive; medication will likely not get patient to goal alone.   May benefit from basal insulin; would also then optimize cgm.   Patient with hyperglycemia signs/symptoms        Medications: patient reviewed understanding. May need to look into PAPs if medication's are cost prohibitive. Patient to schedule diabetes eye exam to see if we can pursue GLP1 or basal insulin as he is uncertain how he would prefer to proceed.  Metformin, glimepiride: continue for now.   Home Monitoring:   BLOOD SUGAR TESTING  Please test your blood sugar:  1 Times per day.  When to test your blood sugar:   Before Breakfast and Bedtime - alternating times  Target Blood sugar range: Before Meals: , 2 hours after meals: < 180  Call if your blood sugar is less than 60   Lifestyle Modifications:referral entered for CM      Orders:    Ambulatory referral to complex care management program; Future      Follow-up: 2 weeks - CM to outreach next week    Subjective   Diabetes  He presents for his initial diabetic visit. He has type 2 diabetes mellitus. The initial diagnosis of diabetes was made 10 years ago. His disease course has been fluctuating. Associated symptoms include fatigue, polydipsia, polyphagia and polyuria. Current diabetic treatment includes oral agent (dual therapy). He is compliant with treatment all of the time. His weight is increasing steadily. His breakfast blood glucose is taken between 8-9 am. His  breakfast blood glucose range is generally >200 mg/dl. His bedtime blood glucose is taken between 9-10 pm. His bedtime blood glucose range is generally >200 mg/dl. His overall blood glucose range is >200 mg/dl.       Medication Adherence/ Tolerability/ Cost:  Patient denies side effects, no issues with cost, 0 missed doses in last two week  Albuterol: no longer using  amLODIPine  aspirin  finasteride  freestyle  FREESTYLE LITE Strp  glimepiride  lisinopril-hydrochlorothiazide  metFORMIN  pravastatin  psyllium  tamsulosin  Vitamin D Caps    Previous Medications  Jardiance, Invokana: insurance limitations    Aware he may need to schedule     Review of Systems   Constitutional:  Positive for fatigue.        Limited mobility due to hip pain   Eyes:  Negative for visual disturbance.   Endocrine: Positive for polydipsia, polyphagia and polyuria.   Psychiatric/Behavioral:  Negative for sleep disturbance.         2. 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 If he is sitting watching tv will snack more frequently    3. Home monitoring devices  Glucometer: Yes, Brand: Freestyle Lite     Hypoglycemia: The patient had 0 episodes/symptoms of hypoglycemia.     Objective       Blood Sugar Readings   The patient is currently checking blood glucose 2 times per day. Patient reports with SMBG logs.    Date AM Post-Breakfast Lunch Post-Lunch Dinner Post-Dinner Comments    247     292     231     316     245     326     246     314     236     255     226     176     196     156     250     279                                            Avg                ASCVD Risk:  The 10-year ASCVD risk score (Gil ORTIZ, et al., 2019) is: 34.6%    Values used to calculate the score:      Age: 70 years      Clincally relevant sex: Male      Is Non- : No      Diabetic: Yes      Tobacco  smoker: No      Systolic Blood Pressure: 142 mmHg      Is BP treated: Yes      HDL Cholesterol: 54 mg/dL      Total Cholesterol: 158 mg/dL     Vitals:  There were no vitals filed for this visit.    Eye Exam:    Lab Results   Component Value Date    LEFTDIABRET None 12/21/2023    RIGHTDIABRET Positive 12/21/2023       Labs:  Lab Results   Component Value Date    SODIUM 138 09/11/2024    K 3.7 09/11/2024    EGFR 91 09/11/2024    CREATININE 0.80 09/11/2024    GLUF 123 (H) 09/11/2024    MICROALBCRE 25 07/19/2023       Lab Results   Component Value Date    HGBA1C 9.5 (A) 05/15/2025    HGBA1C 7.8 (A) 09/11/2024    HGBA1C 7.9 (A) 03/20/2024         Pharmacist Tracking Tool     Pharmacist Tracking Tool  Reason For Outreach: Embedded Pharmacist  Demographics:  Intervention Method: In Person  Type of Intervention: New  Topics Addressed: Diabetes  Pharmacologic Interventions: N/A  Non-Pharmacologic Interventions: Adherence addressed, Care coordination, Disease state education, Home Monitoring, and Medication/Device education  Time:  Direct Patient Care: 30 mins  Care Coordination: 5 mins  Recommendation Recipient: Patient/Caregiver  Outcome: Accepted

## 2025-06-05 LAB
LEFT EYE DIABETIC RETINOPATHY: POSITIVE
LEFT EYE DIABETIC RETINOPATHY: POSITIVE
RIGHT EYE DIABETIC RETINOPATHY: POSITIVE
RIGHT EYE DIABETIC RETINOPATHY: POSITIVE
SEVERITY (EYE EXAM): NORMAL

## 2025-06-10 ENCOUNTER — PATIENT OUTREACH (OUTPATIENT)
Dept: CASE MANAGEMENT | Facility: OTHER | Age: 70
End: 2025-06-10

## 2025-06-10 NOTE — PROGRESS NOTES
Outpatient Care Management Note:     WENDY called Avel and again discussed working to improve his blood sugars. He states that he is already working with someone. WENDY explained that Jaylin and I work together to try and help patients improve their sugars. Avel was not interested at this time.  He does want to continue with Jaylin to address diabetic medications.

## 2025-06-12 ENCOUNTER — OFFICE VISIT (OUTPATIENT)
Dept: FAMILY MEDICINE CLINIC | Facility: CLINIC | Age: 70
End: 2025-06-12

## 2025-06-12 DIAGNOSIS — E11.9 DM TYPE 2, NOT AT GOAL (HCC): ICD-10-CM

## 2025-06-12 DIAGNOSIS — E11.9 DM TYPE 2, NOT AT GOAL (HCC): Primary | ICD-10-CM

## 2025-06-12 LAB
DME PARACHUTE DELIVERY DATE REQUESTED: NORMAL
DME PARACHUTE ITEM DESCRIPTION: NORMAL
DME PARACHUTE ITEM DESCRIPTION: NORMAL
DME PARACHUTE ORDER STATUS: NORMAL
DME PARACHUTE SUPPLIER NAME: NORMAL
DME PARACHUTE SUPPLIER PHONE: NORMAL

## 2025-06-12 PROCEDURE — PBNCHG PB NO CHARGE PLACEHOLDER: Performed by: PHARMACIST

## 2025-06-12 RX ORDER — ASPIRIN 325 MG
325 TABLET ORAL DAILY PRN
COMMUNITY

## 2025-06-12 RX ORDER — MULTIVITAMIN
1 TABLET ORAL DAILY
COMMUNITY

## 2025-06-12 RX ORDER — PEN NEEDLE, DIABETIC 32GX 5/32"
NEEDLE, DISPOSABLE MISCELLANEOUS
Qty: 100 EACH | Refills: 3 | Status: SHIPPED | OUTPATIENT
Start: 2025-06-12

## 2025-06-12 RX ORDER — INSULIN GLARGINE 100 [IU]/ML
INJECTION, SOLUTION SUBCUTANEOUS
Qty: 15 ML | Refills: 3 | Status: SHIPPED | OUTPATIENT
Start: 2025-06-12

## 2025-06-12 RX ORDER — HYDROCHLOROTHIAZIDE 12.5 MG/1
1 CAPSULE ORAL
COMMUNITY

## 2025-06-12 NOTE — TELEPHONE ENCOUNTER
Message from pharmacy that Basaglar was preferred insulin, sent to pharmacy via cpa. No further action required.

## 2025-06-12 NOTE — ASSESSMENT & PLAN NOTE
Lab Results   Component Value Date    HGBA1C 9.5 (A) 05/15/2025     Most recent A1c above goal   Self-monitored blood glucose elevated; no hypoglycemia.   Given retinopathy and no notes from eye provider, would prefer to hold off on GLP1 at this time and prefer to start patient on insulin given A1c. Could consider GLP1 when A1c is lower to reduce impact to vision.   Would not anticipate SGLT2i would allow patient to obtain glycemic goals.      Medications:  Basaglar: initially sent Tresiba but noted that Basaglar was covered. Patient to start taking 10 units once daily. Reviewed storage, admin.   Glimepiride: patient to reduce to 2mg once daily  Metformin: continue 1000mg twice daily   Home Monitoring: reviewed cgm, patient interested in pursuing if covered. Does not have smart phone. Order sent for [employer] to [employer] via [employer]. Patient to bring in cgm to next appointment for placement/education if covered    Orders:    Insulin Pen Needle (BD Pen Needle Tara U/F) 32G X 4 MM MISC; Use daily as directed with insulin pen

## 2025-06-12 NOTE — PATIENT INSTRUCTIONS
Glimepiride: reduce to 1 tablet once daily     Metformin: continue taking 2 tablets twice daily     Tresiba:  from the pharmacy and start taking 10 units once daily - be consistent on time of day    Await call from [employer] regarding cost for Aba 3+ blood sugar sensors

## 2025-06-12 NOTE — PROGRESS NOTES
[facility] Clinical Pharmacy Services  Bess Plummer    Administrative Statements   Encounter provider Bess Plummer      Assessment/ Plan     Assessment & Plan  DM type 2, not at goal (HCC)    Lab Results   Component Value Date    HGBA1C 9.5 (A) 05/15/2025     Most recent A1c above goal   Self-monitored blood glucose elevated; no hypoglycemia.   Given retinopathy and no notes from eye provider, would prefer to hold off on GLP1 at this time and prefer to start patient on insulin given A1c. Could consider GLP1 when A1c is lower to reduce impact to vision.   Would not anticipate SGLT2i would allow patient to obtain glycemic goals.      Medications:  Basaglar: initially sent Tresiba but noted that Basaglar was covered. Patient to start taking 10 units once daily. Reviewed storage, admin.   Glimepiride: patient to reduce to 2mg once daily  Metformin: continue 1000mg twice daily   Home Monitoring: reviewed cgm, patient interested in pursuing if covered. Does not have smart phone. Order sent for [employer] to [employer] via [employer]. Patient to bring in cgm to next appointment for placement/education if covered    Orders:    Insulin Pen Needle (BD Pen Needle Tara U/F) 32G X 4 MM MISC; Use daily as directed with insulin pen      Follow-up: 2 weeks     Subjective   Diabetes  He presents for his initial diabetic visit. He has type 2 diabetes mellitus. The initial diagnosis of diabetes was made 10 years ago. His disease course has been fluctuating. Associated symptoms include fatigue (denies changes), polydipsia, polyphagia (no change) and polyuria. Current diabetic treatment includes oral agent (dual therapy). He is compliant with treatment all of the time. His weight is increasing steadily. His breakfast blood glucose is taken between 8-9 am. His breakfast blood glucose range is generally >200 mg/dl. His bedtime blood glucose is taken between 9-10 pm. His bedtime blood glucose range is  generally >200 mg/dl. His overall blood glucose range is >200 mg/dl.       Medication Adherence/ Tolerability/ Cost:  Patient denies side effects, no issues with cost, 0 missed doses in last two week  Albuterol: no longer using  amLODIPine  Aspirin: takes 325mg 2-3x/week as needed pain   finasteride  freestyle  FREESTYLE LITE Strp  glimepiride  lisinopril-hydrochlorothiazide  metFORMIN  pravastatin  psyllium  tamsulosin  Vitamin D Caps  MVI    Completed Eye exam since last appointment and reports they noted diabetic retinopathy ([place]), Dr. Hunt. Going back in 6 months as they want to watch for changes/improvement. Reports they will not be treating retinopathy at this time.     Review of Systems   Constitutional:  Positive for fatigue (denies changes).        Limited mobility due to hip pain   Eyes:  Negative for visual disturbance (denies changes).   Endocrine: Positive for polydipsia, polyphagia (no change) and polyuria.   Psychiatric/Behavioral:  Negative for sleep disturbance.       2. 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 If he is sitting watching tv will snack more frequently    3. Home monitoring devices  Glucometer: Yes, Brand: Freestyle Lite     Hypoglycemia: The patient had 0 episodes/symptoms of hypoglycemia.     Objective       Blood Sugar Readings   The patient is currently checking blood glucose 2 times per day. Patient reports with SMBG logs.  5/29-6/12  Fasting blood sugar: 216, 209, 241, 228, 211, 217, 226, 193, 208  Bedtime: 261, 312, 233, 257, 311, 198, 306, 266, 288, 190    ASCVD Risk:  The 10-year ASCVD risk score (Gil ORTIZ, et al., 2019) is: 34.6%    Values used to calculate the score:      Age: 70 years      Clincally relevant sex: Male      Is Non- : No      Diabetic: Yes      Tobacco smoker: No      Systolic Blood  Pressure: 142 mmHg      Is BP treated: Yes      HDL Cholesterol: 54 mg/dL      Total Cholesterol: 158 mg/dL     Vitals:  There were no vitals filed for this visit.    Eye Exam:    Lab Results   Component Value Date    LEFTDIABRET Positive 06/05/2025    RIGHTDIABRET Positive 06/05/2025       Labs:  Lab Results   Component Value Date    SODIUM 138 09/11/2024    K 3.7 09/11/2024    EGFR 91 09/11/2024    CREATININE 0.80 09/11/2024    GLUF 123 (H) 09/11/2024    MICROALBCRE 25 07/19/2023       Lab Results   Component Value Date    HGBA1C 9.5 (A) 05/15/2025    HGBA1C 7.8 (A) 09/11/2024    HGBA1C 7.9 (A) 03/20/2024         Pharmacist Tracking Tool     Pharmacist Tracking Tool  Reason For Outreach: Embedded Pharmacist  Demographics:  Intervention Method: In Person  Type of Intervention: Follow-Up  Topics Addressed: Diabetes  Pharmacologic Interventions: Medication Initiation  Non-Pharmacologic Interventions: Care coordination, Disease state education, and Personal CGM  Time:  Direct Patient Care: 30 mins  Care Coordination: 10 mins  Recommendation Recipient: Patient/Caregiver  Outcome: Accepted

## 2025-06-13 ENCOUNTER — TELEPHONE (OUTPATIENT)
Age: 70
End: 2025-06-13

## 2025-06-13 NOTE — TELEPHONE ENCOUNTER
Called patient to review. Spoke with daughter as patient was not home, states patient did not receive needles but did  insulin yesterday.     Called pharmacy, they stated pen needles had to be ordered which were received today and will be filled for patient to  later. Pharmacy also confirmed that patient was dispensed Basaglar.

## 2025-06-13 NOTE — TELEPHONE ENCOUNTER
Patient of (Provider Name) Jaylin Grimm, Pharmacist   At [facility] office    Reason for Call: Pt requesting to speak with Jaylin.    Concern Began on 6/13/25    Action Requested Pt stating that he has a script that he believes is incorrect, pt wanted to speak with Jaylin.    Spoke with Linda in clerical who confirmed that Jaylin was out of the office today and would be back Monday. Linda recommended that I send an encounter to the pharmacist and/or have the pt call back on Monday.     Preferred method of contact: [phone]

## 2025-06-14 DIAGNOSIS — E11.9 TYPE 2 DIABETES MELLITUS WITHOUT COMPLICATION, WITHOUT LONG-TERM CURRENT USE OF INSULIN (HCC): ICD-10-CM

## 2025-06-15 RX ORDER — GLIMEPIRIDE 2 MG/1
TABLET ORAL
Qty: 180 TABLET | Refills: 1 | Status: SHIPPED | OUTPATIENT
Start: 2025-06-15

## 2025-06-16 ENCOUNTER — TELEPHONE (OUTPATIENT)
Dept: ADMINISTRATIVE | Facility: OTHER | Age: 70
End: 2025-06-16

## 2025-06-16 LAB
DME PARACHUTE DELIVERY DATE ACTUAL: NORMAL
DME PARACHUTE DELIVERY DATE EXPECTED: NORMAL
DME PARACHUTE DELIVERY DATE REQUESTED: NORMAL
DME PARACHUTE ITEM DESCRIPTION: NORMAL
DME PARACHUTE ITEM DESCRIPTION: NORMAL
DME PARACHUTE ORDER STATUS: NORMAL
DME PARACHUTE SUPPLIER NAME: NORMAL
DME PARACHUTE SUPPLIER PHONE: NORMAL

## 2025-06-16 NOTE — LETTER
Diabetic Eye Exam Form    Date Requested: 25  Patient: Avel Carter  Patient : [id number]   Referring Provider: Juan Manuel Calero,       DIABETIC Eye Exam Date _______________________________      Type of Exam MUST be documented for Diabetic Eye Exams. Please CHECK ONE.     Retinal Exam       Dilated Retinal Exam       OCT       Optomap-Iris Exam      Fundus Photography       Left Eye - Please check Retinopathy or No Retinopathy        Exam did show retinopathy    Exam did not show retinopathy       Right Eye - Please check Retinopathy or No Retinopathy       Exam did show retinopathy    Exam did not show retinopathy       Comments __________________________________________________________    Practice Providing Exam ______________________________________________    Exam Performed By (print name) _______________________________________      Provider Signature ___________________________________________________      These reports are needed for  compliance.    Please fax this completed form and a copy of the Diabetic Eye Exam report to the [facility] Based Department as soon as possible via Fax [phone], obey Connor: Phone [phone]. Our office is located at [address] [place] [address].     We thank you for your assistance in treating our mutual patient.

## 2025-06-16 NOTE — TELEPHONE ENCOUNTER
----- Message from Jaylin HERRING sent at 6/12/2025 11:56 AM EDT -----  06/12/25 11:57 Remberto, our patient Avel Carter has had Diabetic Eye Exam completed/performed. Please assist in updating the patient chart by making an External outreach to DR. ANTHONY RIDLEY facility located in [place]. The date of service is 2025.Thank you,Jaylin Grimm, PharmacistPG [facility]

## 2025-06-17 NOTE — TELEPHONE ENCOUNTER
Upon review of the In Basket request and the patient's chart, initial outreach has been made via fax to facility. Please see Contacts section for details.     Thank you  Geeta Duncan MA

## 2025-06-20 NOTE — TELEPHONE ENCOUNTER
Upon review of the In Basket request we were able to locate, review, and update the patient chart as requested for Diabetic Eye Exam.    Any additional questions or concerns should be emailed to the Practice Liaisons via the appropriate education email address, please do not reply via In Basket.    Thank you  Geeta Duncan MA   [facility]

## 2025-06-25 ENCOUNTER — OFFICE VISIT (OUTPATIENT)
Dept: FAMILY MEDICINE CLINIC | Facility: CLINIC | Age: 70
End: 2025-06-25

## 2025-06-25 DIAGNOSIS — E11.9 TYPE 2 DIABETES MELLITUS WITHOUT COMPLICATION, WITHOUT LONG-TERM CURRENT USE OF INSULIN (HCC): ICD-10-CM

## 2025-06-25 DIAGNOSIS — E11.65 TYPE 2 DIABETES MELLITUS WITH HYPERGLYCEMIA, WITH LONG-TERM CURRENT USE OF INSULIN (HCC): Primary | ICD-10-CM

## 2025-06-25 DIAGNOSIS — Z79.4 TYPE 2 DIABETES MELLITUS WITH HYPERGLYCEMIA, WITH LONG-TERM CURRENT USE OF INSULIN (HCC): Primary | ICD-10-CM

## 2025-06-25 PROCEDURE — PBNCHG PB NO CHARGE PLACEHOLDER: Performed by: PHARMACIST

## 2025-06-25 RX ORDER — PEN NEEDLE, DIABETIC 31 GX5/16"
NEEDLE, DISPOSABLE MISCELLANEOUS
Qty: 100 EACH | Refills: 1 | Status: SHIPPED | OUTPATIENT
Start: 2025-06-25

## 2025-06-25 NOTE — PROGRESS NOTES
[facility] Clinical Pharmacy Services  Bess Plummer    Administrative Statements   Encounter provider Bess Plummer      Assessment/ Plan     Assessment & Plan  Type 2 diabetes mellitus with hyperglycemia, with long-term current use of insulin ([facility])    Lab Results   Component Value Date    HGBA1C 9.5 (A) 05/15/2025     Most recent A1c above goal   Reported SMBG elevated, improving. No hypoglycemia     Medications:  Basaglar: increase to 12 units once daily  Metformin: continue   Freestyle Aba 3+ CGM successfully placed on patient   Skin cleaned with alcohol  Unscrew applicator; peel off sensor  Combine applicator/sensor by lining up the 2 lines and push until it clicks  Sensor inserted and started   Insertion Site: left arm  Communication with  verified  Patient instructions reviewed:   Sensor is waterproof for showering and swimming  Remove for MRI, CT  Remove if redness, bleeding, swelling, discomfort at site  Replace sensor in 15 days  Glucometer verification: confirm CGM reading if needed    Orders:    Alcohol Swabs (Alcohol Prep) PADS; Use to prep skin prior to placing blood sugar monitor    Type 2 diabetes mellitus without complication, without long-term current use of insulin ([facility])    Lab Results   Component Value Date    HGBA1C 9.5 (A) 05/15/2025       Orders:    metFORMIN (GLUCOPHAGE) 1000 MG tablet; Take 1 tablet (1,000 mg total) by mouth 2 (two) times a day *note tablet strength difference*      Follow-up: 4 weeks     Subjective   Diabetes  He presents for his follow-up diabetic visit. He has type 2 diabetes mellitus. The initial diagnosis of diabetes was made 10 years ago. His disease course has been improving. Associated symptoms include fatigue (denies changes). Current diabetic treatment includes oral agent (dual therapy) and insulin injections. He is compliant with treatment all of the time. He participates in exercise intermittently. His home blood glucose  trend is decreasing steadily. His breakfast blood glucose is taken between 8-9 am. His breakfast blood glucose range is generally >200 mg/dl. His bedtime blood glucose is taken between 9-10 pm. His bedtime blood glucose range is generally >200 mg/dl. His overall blood glucose range is >200 mg/dl.       Medication Adherence/ Tolerability/ Cost:  Patient denies side effects, no issues with cost, 0 missed doses in last two week  Albuterol: no longer using  amLODIPine  Aspirin: takes 325mg 2-3x/week as needed pain   Basaglar: 10 units once daily, no issues with injections. Storing unused pens in fridge. Rotating sites    Looking to change insurance payers, uncertain if he will proceed or when changes will take effect.    Brought in siXis 3 + for placement    Review of Systems   Constitutional:  Positive for fatigue (denies changes).        Limited mobility due to hip pain   Eyes:  Negative for visual disturbance (denies changes).   Psychiatric/Behavioral:  Negative for sleep disturbance.       2. 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 If he is sitting watching tv will snack more frequently.   Uncertain if anything was different during the day when bedtime blood sugar was 138, did not feel hypoglycemia signs/symptoms     3. Home monitoring devices  Glucometer: Yes, Brand: Freestyle Lite     Hypoglycemia: The patient had 0 episodes/symptoms of hypoglycemia.     Objective       Blood Sugar Readings   The patient is currently checking blood glucose 2 times per day. Patient reports with SMBG logs.  6/15-6/25  Fasting blood sugar: 197, 207, 218, 230, 232, 209, 214, 222, 241, 267, 222  Bedtime: 138, 280, 259, 210    ASCVD Risk:  The 10-year ASCVD risk score (Gil DK, et al., 2019) is: 34.6%    Values used to calculate the score:      Age: 70 years      Clincally relevant sex: Male       Is Non- : No      Diabetic: Yes      Tobacco smoker: No      Systolic Blood Pressure: 142 mmHg      Is BP treated: Yes      HDL Cholesterol: 54 mg/dL      Total Cholesterol: 158 mg/dL     Vitals:  There were no vitals filed for this visit.    Eye Exam:    Lab Results   Component Value Date    LEFTDIABRET Positive 06/05/2025    RIGHTDIABRET Positive 06/05/2025       Labs:  Lab Results   Component Value Date    SODIUM 138 09/11/2024    K 3.7 09/11/2024    EGFR 91 09/11/2024    CREATININE 0.80 09/11/2024    GLUF 123 (H) 09/11/2024    MICROALBCRE 25 07/19/2023       Lab Results   Component Value Date    HGBA1C 9.5 (A) 05/15/2025    HGBA1C 7.8 (A) 09/11/2024    HGBA1C 7.9 (A) 03/20/2024         Pharmacist Tracking Tool     Pharmacist Tracking Tool  Reason For Outreach: Embedded Pharmacist  Demographics:  Intervention Method: In Person  Type of Intervention: Follow-Up  Topics Addressed: Diabetes  Pharmacologic Interventions: Medication Initiation  Non-Pharmacologic Interventions: Care coordination, Disease state education, and Personal CGM  Time:  Direct Patient Care: 30 mins  Care Coordination: 10 mins  Recommendation Recipient: Patient/Caregiver  Outcome: Accepted

## 2025-06-25 NOTE — PATIENT INSTRUCTIONS
Basaglar: increase to 12 units once daily    Glimepiride: 1 tablet once daily    Metformin: continue taking 1000mg twice daily     Low Blood Sugar Review  Check your blood sugar if you feel like you are low  Symptoms include tremors, sweating, hunger, confusion, headache  Inform your care team if you have patterns of lows  Treat IMMEDIATELY with 15 grams of carbohydrates if blood sugar is < 70. Examples of 15 grams of carbohydrates include ONE of the following:   3-4 glucose tablets   4 oz of REGULAR soda or juice  1 Tablespoon of honey, table sugar, jelly  2 Tablespoons of raisins  20 skittles  3 rolls of smarties  4 large marshmallows  20 small jelly beans  1 small fruit snack package  Repeat blood sugar test after 15 minutes  Once your sugar is corrected above 70, you should have a snack or meal that includes carbohydrate and protein.

## 2025-06-25 NOTE — ASSESSMENT & PLAN NOTE
Lab Results   Component Value Date    HGBA1C 9.5 (A) 05/15/2025     Most recent A1c above goal   Reported SMBG elevated, improving. No hypoglycemia     Medications:  Basaglar: increase to 12 units once daily  Metformin: continue   Freestyle Aba 3+ CGM successfully placed on patient   Skin cleaned with alcohol  Unscrew applicator; peel off sensor  Combine applicator/sensor by lining up the 2 lines and push until it clicks  Sensor inserted and started   Insertion Site: left arm  Communication with  verified  Patient instructions reviewed:   Sensor is waterproof for showering and swimming  Remove for MRI, CT  Remove if redness, bleeding, swelling, discomfort at site  Replace sensor in 15 days  Glucometer verification: confirm CGM reading if needed    Orders:    Alcohol Swabs (Alcohol Prep) PADS; Use to prep skin prior to placing blood sugar monitor

## 2025-07-22 ENCOUNTER — TELEPHONE (OUTPATIENT)
Dept: FAMILY MEDICINE CLINIC | Facility: CLINIC | Age: 70
End: 2025-07-22

## 2025-07-22 ENCOUNTER — OFFICE VISIT (OUTPATIENT)
Dept: FAMILY MEDICINE CLINIC | Facility: CLINIC | Age: 70
End: 2025-07-22

## 2025-07-22 VITALS — BODY MASS INDEX: 35.71 KG/M2 | WEIGHT: 228 LBS

## 2025-07-22 DIAGNOSIS — Z79.4 TYPE 2 DIABETES MELLITUS WITH HYPERGLYCEMIA, WITH LONG-TERM CURRENT USE OF INSULIN (HCC): Primary | ICD-10-CM

## 2025-07-22 DIAGNOSIS — E11.65 TYPE 2 DIABETES MELLITUS WITH HYPERGLYCEMIA, WITH LONG-TERM CURRENT USE OF INSULIN (HCC): Primary | ICD-10-CM

## 2025-07-22 NOTE — PATIENT INSTRUCTIONS
Glimepiride: stop    Basaglar: 14 units once daily    Metformin: continue taking 1000mg twice daily

## 2025-07-22 NOTE — ASSESSMENT & PLAN NOTE
Lab Results   Component Value Date    HGBA1C 9.5 (A) 05/15/2025   Most recent A1c above goal but not reflective of current treatment as patient was not on regimen.  CGM shows above goal TIR; hypoglycemia not present. Does have frequent drops in blood sugar after meal     Medications:  Basaglar: increase to 14 units once daily  Glimepiride: stop  Metformin: continue 1000mg twice daily   Home Monitoring: continue with Aba 3 +, information sent for patient to receive replacement sensor

## 2025-07-22 NOTE — PROGRESS NOTES
[facility] Clinical Pharmacy Services  Bess Plummer    Administrative Statements   Encounter provider Bess Plummer      Assessment/ Plan     Assessment & Plan  Type 2 diabetes mellitus with hyperglycemia, with long-term current use of insulin ([facility])    Lab Results   Component Value Date    HGBA1C 9.5 (A) 05/15/2025   Most recent A1c above goal but not reflective of current treatment as patient was not on regimen.  CGM shows above goal TIR; hypoglycemia not present. Does have frequent drops in blood sugar after meal     Medications:  Basaglar: increase to 14 units once daily  Glimepiride: stop  Metformin: continue 1000mg twice daily   Home Monitoring: continue with Aba 3 +, information sent for patient to receive replacement sensor           Follow-up: 4-6 weeks     Subjective   Diabetes  He presents for his follow-up diabetic visit. He has type 2 diabetes mellitus. No MedicAlert identification noted. The initial diagnosis of diabetes was made 10 years ago. His disease course has been improving. Associated symptoms include fatigue (denies changes). Pertinent negatives for diabetes include no blurred vision, no polydipsia, no polyphagia, no polyuria and no visual change. Symptoms are stable. Current diabetic treatment includes oral agent (dual therapy) and insulin injections. He is compliant with treatment all of the time. He participates in exercise intermittently. His home blood glucose trend is decreasing steadily.     Medication Adherence/ Tolerability/ Cost:  Patient denies side effects, no issues with cost, 0 missed doses in last two week  Aspirin: takes 325mg 2-3x/week as needed pain   Basaglar: 12 units once daily, no issues with injections. Storing unused pens in fridge. Rotating sites  Glimepiride: 2mg once daily    Looking to change insurance payers, uncertain if he will proceed or when changes will take effect.    Review of Systems   Constitutional:  Positive for fatigue  (denies changes).        Limited mobility due to hip pain   Eyes:  Negative for blurred vision and visual disturbance (denies changes).   Endocrine: Negative for polydipsia, polyphagia and polyuria.   Psychiatric/Behavioral:  Negative for sleep disturbance.       2. Lifestyle:   Denies changes in dietary or lifestyle habits  Ripped off one sensor after a few days as it was caught on kitchen cabinet, inquires how to get a replacement sensor  Has been paying attention to blood sugar readings on sensor closely and noticing how different types of bread impact glycemic control    3. Home monitoring devices  Glucometer: Yes, Brand: Freestyle Lite     Hypoglycemia: The patient had 0 episodes/symptoms of hypoglycemia.     Objective       Blood Sugar Readings   Cgm report scanned into chart    ASCVD Risk:  The 10-year ASCVD risk score (Gil ORTIZ, et al., 2019) is: 34.6%    Values used to calculate the score:      Age: 70 years      Clincally relevant sex: Male      Is Non- : No      Diabetic: Yes      Tobacco smoker: No      Systolic Blood Pressure: 142 mmHg      Is BP treated: Yes      HDL Cholesterol: 54 mg/dL      Total Cholesterol: 158 mg/dL     Vitals:  There were no vitals filed for this visit.    Eye Exam:    Lab Results   Component Value Date    LEFTDIABRET Positive 06/05/2025    RIGHTDIABRET Positive 06/05/2025       Labs:  Lab Results   Component Value Date    SODIUM 138 09/11/2024    K 3.7 09/11/2024    EGFR 91 09/11/2024    CREATININE 0.80 09/11/2024    GLUF 123 (H) 09/11/2024    MICROALBCRE 25 07/19/2023       Lab Results   Component Value Date    HGBA1C 9.5 (A) 05/15/2025    HGBA1C 7.8 (A) 09/11/2024    HGBA1C 7.9 (A) 03/20/2024         Pharmacist Tracking Tool     Pharmacist Tracking Tool  Reason For Outreach: Embedded Pharmacist  Demographics:  Intervention Method: In Person  Type of Intervention: Follow-Up  Topics Addressed: Diabetes  Pharmacologic Interventions: Medication  Discontinuation and Dose or Frequency Adjusted  Non-Pharmacologic Interventions: Personal CGM  Time:  Direct Patient Care: 15 mins  Care Coordination: 10 mins  Recommendation Recipient: Patient/Caregiver  Outcome: Accepted

## 2025-08-02 ENCOUNTER — HOSPITAL ENCOUNTER (EMERGENCY)
Facility: HOSPITAL | Age: 70
Discharge: HOME | End: 2025-08-02
Attending: EMERGENCY MEDICINE
Payer: MEDICARE

## 2025-08-02 ENCOUNTER — APPOINTMENT (EMERGENCY)
Dept: RADIOLOGY | Facility: HOSPITAL | Age: 70
End: 2025-08-02
Payer: MEDICARE

## 2025-08-02 VITALS
BODY MASS INDEX: 35.22 KG/M2 | DIASTOLIC BLOOD PRESSURE: 84 MMHG | RESPIRATION RATE: 14 BRPM | TEMPERATURE: 98.7 F | HEART RATE: 99 BPM | WEIGHT: 224.87 LBS | OXYGEN SATURATION: 97 % | SYSTOLIC BLOOD PRESSURE: 181 MMHG

## 2025-08-02 DIAGNOSIS — S60.559A FOREIGN BODY IN HAND: Primary | ICD-10-CM

## 2025-08-02 PROCEDURE — 90471 IMMUNIZATION ADMIN: CPT

## 2025-08-02 PROCEDURE — 90715 TDAP VACCINE 7 YRS/> IM: CPT

## 2025-08-02 PROCEDURE — 99283 EMERGENCY DEPT VISIT LOW MDM: CPT

## 2025-08-02 PROCEDURE — 73130 X-RAY EXAM OF HAND: CPT

## 2025-08-02 PROCEDURE — 99284 EMERGENCY DEPT VISIT MOD MDM: CPT

## 2025-08-02 PROCEDURE — 10120 INC&RMVL FB SUBQ TISS SMPL: CPT

## 2025-08-02 RX ORDER — CEPHALEXIN 500 MG/1
500 CAPSULE ORAL EVERY 6 HOURS SCHEDULED
Qty: 20 CAPSULE | Refills: 0 | Status: SHIPPED | OUTPATIENT
Start: 2025-08-02 | End: 2025-08-07

## 2025-08-02 RX ORDER — LIDOCAINE HYDROCHLORIDE 10 MG/ML
10 INJECTION, SOLUTION EPIDURAL; INFILTRATION; INTRACAUDAL; PERINEURAL ONCE
Status: COMPLETED | OUTPATIENT
Start: 2025-08-02 | End: 2025-08-02

## 2025-08-02 RX ADMIN — CEPHALEXIN 500 MG: 250 CAPSULE ORAL at 19:47

## 2025-08-02 RX ADMIN — TETANUS TOXOID, REDUCED DIPHTHERIA TOXOID AND ACELLULAR PERTUSSIS VACCINE, ADSORBED 0.5 ML: 5; 2.5; 8; 8; 2.5 SUSPENSION INTRAMUSCULAR at 18:49

## 2025-08-02 RX ADMIN — LIDOCAINE HYDROCHLORIDE 10 ML: 10 INJECTION, SOLUTION EPIDURAL; INFILTRATION; INTRACAUDAL at 19:25

## 2025-08-13 ENCOUNTER — PATIENT MESSAGE (OUTPATIENT)
Dept: FAMILY MEDICINE CLINIC | Facility: CLINIC | Age: 70
End: 2025-08-13

## (undated) DEVICE — INTENDED FOR TISSUE SEPARATION, AND OTHER PROCEDURES THAT REQUIRE A SHARP SURGICAL BLADE TO PUNCTURE OR CUT.: Brand: BARD-PARKER SAFETY BLADES SIZE 15, STERILE

## (undated) DEVICE — SUT VICRYL 2-0 SH 27 IN UNDYED J417H

## (undated) DEVICE — PLUMEPEN PRO 10FT

## (undated) DEVICE — SCD SEQUENTIAL COMPRESSION COMFORT SLEEVE MEDIUM KNEE LENGTH: Brand: KENDALL SCD

## (undated) DEVICE — TUBING SUCTION 5MM X 12 FT

## (undated) DEVICE — SUT VICRYL 3-0 SH 27 IN J416H

## (undated) DEVICE — MEDI-VAC YANKAUER SUCTION HANDLE W/BULBOUS AND CONTROL VENT: Brand: CARDINAL HEALTH

## (undated) DEVICE — GLOVE SRG BIOGEL ECLIPSE 7

## (undated) DEVICE — GLOVE INDICATOR PI UNDERGLOVE SZ 7.5 BLUE

## (undated) DEVICE — SUT MONOCRYL 4-0 PS-2 27 IN Y426H

## (undated) DEVICE — DRAPE LAPAROTOMY W/POUCHES

## (undated) DEVICE — GLOVE PI ULTRA TOUCH SZ.8.5

## (undated) DEVICE — CHLORAPREP HI-LITE 26ML ORANGE

## (undated) DEVICE — GLOVE INDICATOR PI UNDERGLOVE SZ 8.5 BLUE

## (undated) DEVICE — GLOVE INDICATOR PI UNDERGLOVE SZ 6.5 BLUE

## (undated) DEVICE — SUT PROLENE 2-0 MO-6 30 IN 8417H

## (undated) DEVICE — GLOVE SRG BIOGEL ECLIPSE 7.5

## (undated) DEVICE — GLOVE SRG BIOGEL 6.5

## (undated) DEVICE — GLOVE INDICATOR PI UNDERGLOVE SZ 8 BLUE

## (undated) DEVICE — 2000CC GUARDIAN II: Brand: GUARDIAN

## (undated) DEVICE — ADHESIVE SKIN HIGH VISCOSITY EXOFIN 1ML

## (undated) DEVICE — BETHLEHEM UNIVERSAL MINOR GEN: Brand: CARDINAL HEALTH